# Patient Record
Sex: FEMALE | Race: WHITE | NOT HISPANIC OR LATINO | Employment: OTHER | URBAN - METROPOLITAN AREA
[De-identification: names, ages, dates, MRNs, and addresses within clinical notes are randomized per-mention and may not be internally consistent; named-entity substitution may affect disease eponyms.]

---

## 2018-09-04 ENCOUNTER — HOSPITAL ENCOUNTER (OUTPATIENT)
Dept: RADIOLOGY | Facility: HOSPITAL | Age: 63
Discharge: HOME | End: 2018-09-04
Attending: NURSE PRACTITIONER
Payer: MEDICAID

## 2018-09-04 VITALS — HEIGHT: 62 IN | BODY MASS INDEX: 42.69 KG/M2 | WEIGHT: 232 LBS

## 2018-09-04 DIAGNOSIS — Z12.31 VISIT FOR SCREENING MAMMOGRAM: ICD-10-CM

## 2018-09-04 DIAGNOSIS — Z12.31 VISIT FOR SCREENING MAMMOGRAM: Primary | ICD-10-CM

## 2018-09-04 PROCEDURE — 77067 SCR MAMMO BI INCL CAD: CPT | Mod: 26,,, | Performed by: RADIOLOGY

## 2018-09-04 PROCEDURE — 77067 SCR MAMMO BI INCL CAD: CPT | Mod: TC

## 2019-08-08 DIAGNOSIS — Z12.39 SCREENING BREAST EXAMINATION: Primary | ICD-10-CM

## 2019-11-26 DIAGNOSIS — Z12.31 ENCOUNTER FOR SCREENING MAMMOGRAM FOR BREAST CANCER: Primary | ICD-10-CM

## 2019-12-19 ENCOUNTER — HOSPITAL ENCOUNTER (OUTPATIENT)
Dept: RADIOLOGY | Facility: HOSPITAL | Age: 64
Discharge: HOME | End: 2019-12-19
Attending: NURSE PRACTITIONER
Payer: MEDICARE

## 2019-12-19 VITALS — HEIGHT: 62 IN | BODY MASS INDEX: 42.68 KG/M2 | WEIGHT: 231.94 LBS

## 2019-12-19 DIAGNOSIS — C50.919 BREAST CANCER IN FEMALE: ICD-10-CM

## 2019-12-19 DIAGNOSIS — Z12.31 ENCOUNTER FOR SCREENING MAMMOGRAM FOR BREAST CANCER: ICD-10-CM

## 2019-12-19 PROCEDURE — 77063 BREAST TOMOSYNTHESIS BI: CPT | Mod: 26,,, | Performed by: RADIOLOGY

## 2019-12-19 PROCEDURE — 77067 SCR MAMMO BI INCL CAD: CPT | Mod: 26,,, | Performed by: RADIOLOGY

## 2019-12-19 PROCEDURE — 77067 MAMMO DIGITAL SCREENING BILAT WITH TOMOSYNTHESIS_CAD: ICD-10-PCS | Mod: 26,,, | Performed by: RADIOLOGY

## 2019-12-19 PROCEDURE — 77067 SCR MAMMO BI INCL CAD: CPT | Mod: TC

## 2019-12-19 PROCEDURE — 77063 MAMMO DIGITAL SCREENING BILAT WITH TOMOSYNTHESIS_CAD: ICD-10-PCS | Mod: 26,,, | Performed by: RADIOLOGY

## 2019-12-19 PROCEDURE — 77063 BREAST TOMOSYNTHESIS BI: CPT | Mod: TC

## 2020-01-04 ENCOUNTER — HOSPITAL ENCOUNTER (EMERGENCY)
Facility: HOSPITAL | Age: 65
Discharge: HOME | End: 2020-01-04
Attending: EMERGENCY MEDICINE
Payer: MEDICARE

## 2020-01-04 VITALS
RESPIRATION RATE: 18 BRPM | WEIGHT: 218 LBS | SYSTOLIC BLOOD PRESSURE: 125 MMHG | OXYGEN SATURATION: 98 % | HEIGHT: 62 IN | TEMPERATURE: 98 F | BODY MASS INDEX: 40.12 KG/M2 | HEART RATE: 91 BPM | DIASTOLIC BLOOD PRESSURE: 70 MMHG

## 2020-01-04 DIAGNOSIS — L03.116 CELLULITIS OF LEFT FOOT: Primary | ICD-10-CM

## 2020-01-04 DIAGNOSIS — R52 PAIN: ICD-10-CM

## 2020-01-04 LAB
ALBUMIN SERPL BCP-MCNC: 4.4 G/DL (ref 3.5–5.2)
ALP SERPL-CCNC: 67 U/L (ref 55–135)
ALT SERPL W/O P-5'-P-CCNC: 11 U/L (ref 10–44)
ANION GAP SERPL CALC-SCNC: 18 MMOL/L (ref 8–16)
APTT BLDCRRT: 31.9 SEC (ref 21–32)
AST SERPL-CCNC: 17 U/L (ref 10–40)
BASOPHILS # BLD AUTO: 0.04 K/UL (ref 0–0.2)
BASOPHILS NFR BLD: 0.3 % (ref 0–1.9)
BILIRUB SERPL-MCNC: 1.4 MG/DL (ref 0.1–1)
BUN SERPL-MCNC: 12 MG/DL (ref 8–23)
CALCIUM SERPL-MCNC: 9.3 MG/DL (ref 8.7–10.5)
CHLORIDE SERPL-SCNC: 96 MMOL/L (ref 95–110)
CO2 SERPL-SCNC: 21 MMOL/L (ref 23–29)
CREAT SERPL-MCNC: 0.9 MG/DL (ref 0.5–1.4)
CRP SERPL-MCNC: 8 MG/DL (ref 0–0.75)
DIFFERENTIAL METHOD: ABNORMAL
EOSINOPHIL # BLD AUTO: 0 K/UL (ref 0–0.5)
EOSINOPHIL NFR BLD: 0.2 % (ref 0–8)
ERYTHROCYTE [DISTWIDTH] IN BLOOD BY AUTOMATED COUNT: 15.1 % (ref 11.5–14.5)
ERYTHROCYTE [SEDIMENTATION RATE] IN BLOOD BY WESTERGREN METHOD: 70 MM/HR (ref 0–20)
EST. GFR  (AFRICAN AMERICAN): >60 ML/MIN/1.73 M^2
EST. GFR  (NON AFRICAN AMERICAN): >60 ML/MIN/1.73 M^2
GLUCOSE SERPL-MCNC: 131 MG/DL (ref 70–110)
HCT VFR BLD AUTO: 38.9 % (ref 37–48.5)
HGB BLD-MCNC: 12.4 G/DL (ref 12–16)
IMM GRANULOCYTES # BLD AUTO: 0.04 K/UL (ref 0–0.04)
IMM GRANULOCYTES NFR BLD AUTO: 0.3 % (ref 0–0.5)
INR PPP: 1.2 (ref 0.8–1.2)
LYMPHOCYTES # BLD AUTO: 2.5 K/UL (ref 1–4.8)
LYMPHOCYTES NFR BLD: 19.2 % (ref 18–48)
MCH RBC QN AUTO: 25.7 PG (ref 27–31)
MCHC RBC AUTO-ENTMCNC: 31.9 G/DL (ref 32–36)
MCV RBC AUTO: 81 FL (ref 82–98)
MONOCYTES # BLD AUTO: 0.8 K/UL (ref 0.3–1)
MONOCYTES NFR BLD: 6.6 % (ref 4–15)
NEUTROPHILS # BLD AUTO: 9.4 K/UL (ref 1.8–7.7)
NEUTROPHILS NFR BLD: 73.4 % (ref 38–73)
NRBC BLD-RTO: 0 /100 WBC
PLATELET # BLD AUTO: 250 K/UL (ref 150–350)
PMV BLD AUTO: 10.1 FL (ref 9.2–12.9)
POCT GLUCOSE: 105 MG/DL (ref 70–110)
POTASSIUM SERPL-SCNC: 4 MMOL/L (ref 3.5–5.1)
PROT SERPL-MCNC: 8 G/DL (ref 6–8.4)
PROTHROMBIN TIME: 13.4 SEC (ref 9–12.5)
RBC # BLD AUTO: 4.82 M/UL (ref 4–5.4)
SODIUM SERPL-SCNC: 135 MMOL/L (ref 136–145)
WBC # BLD AUTO: 12.78 K/UL (ref 3.9–12.7)

## 2020-01-04 PROCEDURE — S0077 INJECTION, CLINDAMYCIN PHOSP: HCPCS | Performed by: EMERGENCY MEDICINE

## 2020-01-04 PROCEDURE — 85610 PROTHROMBIN TIME: CPT

## 2020-01-04 PROCEDURE — 85651 RBC SED RATE NONAUTOMATED: CPT

## 2020-01-04 PROCEDURE — 36415 COLL VENOUS BLD VENIPUNCTURE: CPT

## 2020-01-04 PROCEDURE — 96365 THER/PROPH/DIAG IV INF INIT: CPT

## 2020-01-04 PROCEDURE — 85025 COMPLETE CBC W/AUTO DIFF WBC: CPT

## 2020-01-04 PROCEDURE — 25000003 PHARM REV CODE 250: Performed by: EMERGENCY MEDICINE

## 2020-01-04 PROCEDURE — 73630 XR FOOT COMPLETE 3 VIEW LEFT: ICD-10-PCS | Mod: 26,LT,, | Performed by: RADIOLOGY

## 2020-01-04 PROCEDURE — 99284 EMERGENCY DEPT VISIT MOD MDM: CPT | Mod: 25

## 2020-01-04 PROCEDURE — 82962 GLUCOSE BLOOD TEST: CPT

## 2020-01-04 PROCEDURE — 85730 THROMBOPLASTIN TIME PARTIAL: CPT

## 2020-01-04 PROCEDURE — 73630 X-RAY EXAM OF FOOT: CPT | Mod: TC,FY,LT

## 2020-01-04 PROCEDURE — 86140 C-REACTIVE PROTEIN: CPT

## 2020-01-04 PROCEDURE — 87040 BLOOD CULTURE FOR BACTERIA: CPT

## 2020-01-04 PROCEDURE — 73630 X-RAY EXAM OF FOOT: CPT | Mod: 26,LT,, | Performed by: RADIOLOGY

## 2020-01-04 PROCEDURE — 80053 COMPREHEN METABOLIC PANEL: CPT

## 2020-01-04 RX ORDER — LOVASTATIN 10 MG/1
10 TABLET ORAL NIGHTLY
COMMUNITY
End: 2020-10-07 | Stop reason: SDUPTHER

## 2020-01-04 RX ORDER — FUROSEMIDE 40 MG/1
40 TABLET ORAL DAILY
COMMUNITY
End: 2020-10-07 | Stop reason: SDUPTHER

## 2020-01-04 RX ORDER — CLINDAMYCIN HYDROCHLORIDE 300 MG/1
300 CAPSULE ORAL 3 TIMES DAILY
Qty: 30 CAPSULE | Refills: 0 | Status: SHIPPED | OUTPATIENT
Start: 2020-01-04 | End: 2020-01-14

## 2020-01-04 RX ORDER — POTASSIUM CHLORIDE 750 MG/1
10 TABLET, EXTENDED RELEASE ORAL ONCE
Status: ON HOLD | COMMUNITY
End: 2023-01-01 | Stop reason: HOSPADM

## 2020-01-04 RX ORDER — LURASIDONE HYDROCHLORIDE 40 MG/1
80 TABLET, FILM COATED ORAL DAILY
COMMUNITY
End: 2020-09-24

## 2020-01-04 RX ORDER — LEVOTHYROXINE SODIUM 100 UG/1
100 TABLET ORAL DAILY
COMMUNITY
End: 2020-10-07 | Stop reason: SDUPTHER

## 2020-01-04 RX ORDER — CLINDAMYCIN PHOSPHATE 900 MG/50ML
900 INJECTION, SOLUTION INTRAVENOUS
Status: COMPLETED | OUTPATIENT
Start: 2020-01-04 | End: 2020-01-04

## 2020-01-04 RX ORDER — LISINOPRIL 20 MG/1
20 TABLET ORAL DAILY
COMMUNITY
End: 2020-10-07 | Stop reason: SDUPTHER

## 2020-01-04 RX ADMIN — CLINDAMYCIN IN 5 PERCENT DEXTROSE 900 MG: 18 INJECTION, SOLUTION INTRAVENOUS at 01:01

## 2020-01-07 NOTE — ED PROVIDER NOTES
Encounter Date: 1/4/2020       History     Chief Complaint   Patient presents with    Cellulitis     Patient has cellulitis of the left foot x4 days.     64-year-old female with past medical history of anxiety, bipolar disorder/schizophrenia, CHF, COPD, depression, diabetes, hypertension, PVD, and hypothyroidism presents to the ED for evaluation of left foot erythema, swelling, pain noted 4 days ago.  Pain is described as intermittent, throbbing, non-radiating.  Ambulatory without difficulty. Denies fever, chills, red streaking.          Review of patient's allergies indicates:   Allergen Reactions    Iodine and iodide containing products      Past Medical History:   Diagnosis Date    Anxiety     Bipolar 1 disorder     Breast cancer     CHF (congestive heart failure)     COPD (chronic obstructive pulmonary disease)     Depression     Diabetes mellitus     Hypertension     PVD (peripheral vascular disease)     Schizophrenia     Thyroid disease      Past Surgical History:   Procedure Laterality Date    BREAST LUMPECTOMY      TONSILLECTOMY       Family History   Problem Relation Age of Onset    Lung cancer Mother     Breast cancer Neg Hx      Social History     Tobacco Use    Smoking status: Former Smoker   Substance Use Topics    Alcohol use: Not Currently    Drug use: Never     Review of Systems   Constitutional: Negative for appetite change, chills, diaphoresis, fatigue and fever.   HENT: Negative for congestion, ear pain, rhinorrhea, sinus pressure, sinus pain, sore throat and tinnitus.    Eyes: Negative for photophobia and visual disturbance.   Respiratory: Negative for cough, chest tightness, shortness of breath and wheezing.    Cardiovascular: Negative for chest pain, palpitations and leg swelling.   Gastrointestinal: Negative for abdominal pain, constipation, diarrhea, nausea and vomiting.   Endocrine: Negative for cold intolerance, heat intolerance, polydipsia, polyphagia and polyuria.    Genitourinary: Negative for decreased urine volume, difficulty urinating, dysuria, flank pain, frequency, hematuria and urgency.   Musculoskeletal: Positive for arthralgias. Negative for back pain, gait problem, joint swelling, myalgias, neck pain and neck stiffness.   Skin: Positive for color change. Negative for pallor, rash and wound.   Allergic/Immunologic: Negative for immunocompromised state.   Neurological: Negative for dizziness, syncope, weakness, light-headedness, numbness and headaches.   Hematological: Negative for adenopathy. Does not bruise/bleed easily.   Psychiatric/Behavioral: Negative for decreased concentration, dysphoric mood and sleep disturbance. The patient is not nervous/anxious.    All other systems reviewed and are negative.      Physical Exam     Initial Vitals [01/04/20 1155]   BP Pulse Resp Temp SpO2   122/62 98 20 98.1 °F (36.7 °C) 97 %      MAP       --         Physical Exam    Nursing note and vitals reviewed.  Constitutional: She appears well-developed and well-nourished. She is not diaphoretic. No distress.   HENT:   Head: Normocephalic and atraumatic.   Right Ear: External ear normal.   Left Ear: External ear normal.   Nose: Nose normal.   Mouth/Throat: Oropharynx is clear and moist.   Eyes: Conjunctivae are normal. Pupils are equal, round, and reactive to light. No scleral icterus.   Neck: Normal range of motion. Neck supple. No JVD present.   Cardiovascular: Normal rate, regular rhythm, normal heart sounds and intact distal pulses.   Pulmonary/Chest: Breath sounds normal. No respiratory distress. She has no wheezes. She has no rhonchi. She exhibits no tenderness.   Abdominal: Soft. Bowel sounds are normal. She exhibits no distension. There is no tenderness. There is no rebound and no guarding.   Musculoskeletal: Normal range of motion. She exhibits no edema or tenderness.   Lymphadenopathy:     She has no cervical adenopathy.   Neurological: She is alert and oriented to person,  place, and time. GCS score is 15. GCS eye subscore is 4. GCS verbal subscore is 5. GCS motor subscore is 6.   Skin: Skin is warm and dry. Capillary refill takes less than 2 seconds. No rash noted. There is erythema. No pallor.   Psychiatric: She has a normal mood and affect. Her behavior is normal. Judgment and thought content normal.         ED Course   Procedures  Labs Reviewed   CBC W/ AUTO DIFFERENTIAL - Abnormal; Notable for the following components:       Result Value    WBC 12.78 (*)     Mean Corpuscular Volume 81 (*)     Mean Corpuscular Hemoglobin 25.7 (*)     Mean Corpuscular Hemoglobin Conc 31.9 (*)     RDW 15.1 (*)     Gran # (ANC) 9.4 (*)     Gran% 73.4 (*)     All other components within normal limits   COMPREHENSIVE METABOLIC PANEL - Abnormal; Notable for the following components:    Sodium 135 (*)     CO2 21 (*)     Glucose 131 (*)     Total Bilirubin 1.4 (*)     Anion Gap 18 (*)     All other components within normal limits   PROTIME-INR - Abnormal; Notable for the following components:    Prothrombin Time 13.4 (*)     All other components within normal limits   SEDIMENTATION RATE - Abnormal; Notable for the following components:    Sed Rate 70 (*)     All other components within normal limits   C-REACTIVE PROTEIN - Abnormal; Notable for the following components:    CRP 8.00 (*)     All other components within normal limits   CULTURE, BLOOD   CULTURE, BLOOD   APTT   POCT GLUCOSE          Imaging Results          X-Ray Foot Complete Left (Final result)  Result time 01/04/20 13:52:46    Final result by Alphonse Herrera MD (01/04/20 13:52:46)                 Impression:      Diffuse soft tissue swelling of the left foot without acute osseous abnormality      Electronically signed by: Alphonse Herrera MD  Date:    01/04/2020  Time:    13:52             Narrative:    EXAMINATION:  XR FOOT COMPLETE 3 VIEW LEFT    CLINICAL HISTORY:  .  Pain, unspecified    TECHNIQUE:  AP, lateral and oblique views of  the left foot were performed.    COMPARISON:  None    FINDINGS:  There is abundant soft tissue swelling diffusely throughout the left foot.  There is no soft tissue gas.  There is no fracture, dislocation, or osseous erosion.  A moderate Achilles enthesophyte is present.                                 Medical Decision Making:   Differential Diagnosis:   Cellulitis  ED Management:  As reviewed with the patient and daughter who are amenable to discharge with oral antibiotics  Advised return precautions and instructed to follow up closely with primary care                                 Clinical Impression:       ICD-10-CM ICD-9-CM   1. Cellulitis of left foot L03.116 682.7   2. Pain R52 780.96         Disposition:   Disposition: Discharged  Condition: Stable                     Betty Shields MD  01/07/20 0410

## 2020-01-09 LAB
BACTERIA BLD CULT: NORMAL
BACTERIA BLD CULT: NORMAL

## 2020-01-30 ENCOUNTER — LAB VISIT (OUTPATIENT)
Dept: LAB | Facility: HOSPITAL | Age: 65
End: 2020-01-30
Attending: NURSE PRACTITIONER
Payer: MEDICARE

## 2020-01-30 DIAGNOSIS — Z11.59 SCREENING EXAMINATION FOR POLIOMYELITIS: Primary | ICD-10-CM

## 2020-01-30 LAB
ALBUMIN SERPL BCP-MCNC: 4.2 G/DL (ref 3.5–5.2)
ALP SERPL-CCNC: 68 U/L (ref 55–135)
ALT SERPL W/O P-5'-P-CCNC: 12 U/L (ref 10–44)
ANION GAP SERPL CALC-SCNC: 12 MMOL/L (ref 8–16)
AST SERPL-CCNC: 18 U/L (ref 10–40)
BILIRUB SERPL-MCNC: 0.5 MG/DL (ref 0.1–1)
BUN SERPL-MCNC: 25 MG/DL (ref 8–23)
CALCIUM SERPL-MCNC: 9.1 MG/DL (ref 8.7–10.5)
CHLORIDE SERPL-SCNC: 98 MMOL/L (ref 95–110)
CO2 SERPL-SCNC: 28 MMOL/L (ref 23–29)
CREAT SERPL-MCNC: 0.9 MG/DL (ref 0.5–1.4)
EST. GFR  (AFRICAN AMERICAN): >60 ML/MIN/1.73 M^2
EST. GFR  (NON AFRICAN AMERICAN): >60 ML/MIN/1.73 M^2
GLUCOSE SERPL-MCNC: 116 MG/DL (ref 70–110)
POTASSIUM SERPL-SCNC: 4.4 MMOL/L (ref 3.5–5.1)
PROT SERPL-MCNC: 7.1 G/DL (ref 6–8.4)
SODIUM SERPL-SCNC: 138 MMOL/L (ref 136–145)

## 2020-01-30 PROCEDURE — 36415 COLL VENOUS BLD VENIPUNCTURE: CPT

## 2020-01-30 PROCEDURE — 86709 HEPATITIS A IGM ANTIBODY: CPT

## 2020-01-30 PROCEDURE — 80053 COMPREHEN METABOLIC PANEL: CPT

## 2020-01-30 PROCEDURE — 86705 HEP B CORE ANTIBODY IGM: CPT

## 2020-01-30 PROCEDURE — 86790 VIRUS ANTIBODY NOS: CPT

## 2020-01-30 PROCEDURE — 87340 HEPATITIS B SURFACE AG IA: CPT

## 2020-01-31 LAB
HAV IGM SERPL QL IA: ABNORMAL
HBV CORE IGM SERPL QL IA: NEGATIVE
HBV SURFACE AG SERPL QL IA: NEGATIVE
HEPATITIS A ANTIBODY, IGG: POSITIVE

## 2020-02-01 LAB — HEPATITIS C VIRUS (HCV) RNA DETECTION/QUANTIFICATION RT-PCR: NORMAL IU/ML

## 2020-09-24 ENCOUNTER — OFFICE VISIT (OUTPATIENT)
Dept: FAMILY MEDICINE | Facility: CLINIC | Age: 65
End: 2020-09-24
Payer: MEDICARE

## 2020-09-24 ENCOUNTER — TELEPHONE (OUTPATIENT)
Dept: FAMILY MEDICINE | Facility: CLINIC | Age: 65
End: 2020-09-24

## 2020-09-24 VITALS
BODY MASS INDEX: 44.53 KG/M2 | WEIGHT: 242 LBS | TEMPERATURE: 98 F | HEIGHT: 62 IN | OXYGEN SATURATION: 96 % | SYSTOLIC BLOOD PRESSURE: 160 MMHG | DIASTOLIC BLOOD PRESSURE: 80 MMHG | HEART RATE: 104 BPM

## 2020-09-24 DIAGNOSIS — R79.9 ABNORMAL FINDING OF BLOOD CHEMISTRY, UNSPECIFIED: ICD-10-CM

## 2020-09-24 DIAGNOSIS — Z13.1 SCREENING FOR DIABETES MELLITUS: ICD-10-CM

## 2020-09-24 DIAGNOSIS — F31.9 BIPOLAR AFFECTIVE DISORDER, REMISSION STATUS UNSPECIFIED: Primary | ICD-10-CM

## 2020-09-24 DIAGNOSIS — I10 ESSENTIAL HYPERTENSION: ICD-10-CM

## 2020-09-24 DIAGNOSIS — E03.9 HYPOTHYROIDISM, UNSPECIFIED TYPE: ICD-10-CM

## 2020-09-24 DIAGNOSIS — I50.9 CONGESTIVE HEART FAILURE, UNSPECIFIED HF CHRONICITY, UNSPECIFIED HEART FAILURE TYPE: ICD-10-CM

## 2020-09-24 DIAGNOSIS — Z11.59 ENCOUNTER FOR HEPATITIS C SCREENING TEST FOR LOW RISK PATIENT: ICD-10-CM

## 2020-09-24 PROCEDURE — 99203 OFFICE O/P NEW LOW 30 MIN: CPT | Mod: S$GLB,,, | Performed by: FAMILY MEDICINE

## 2020-09-24 PROCEDURE — 99203 PR OFFICE/OUTPT VISIT, NEW, LEVL III, 30-44 MIN: ICD-10-PCS | Mod: S$GLB,,, | Performed by: FAMILY MEDICINE

## 2020-09-24 RX ORDER — PROMETHAZINE HYDROCHLORIDE AND DEXTROMETHORPHAN HYDROBROMIDE 6.25; 15 MG/5ML; MG/5ML
5 SYRUP ORAL EVERY 6 HOURS PRN
COMMUNITY
Start: 2020-06-19 | End: 2020-09-24

## 2020-09-24 RX ORDER — ARIPIPRAZOLE 10 MG/1
30 TABLET ORAL DAILY
Qty: 90 TABLET | Refills: 11 | Status: SHIPPED | OUTPATIENT
Start: 2020-09-24 | End: 2021-09-24

## 2020-09-24 RX ORDER — BENZTROPINE MESYLATE 0.5 MG/1
TABLET ORAL
COMMUNITY
Start: 2020-09-21 | End: 2020-09-24

## 2020-09-24 RX ORDER — BREXPIPRAZOLE 3 MG/1
TABLET ORAL
COMMUNITY
Start: 2020-07-29 | End: 2020-09-24

## 2020-09-24 RX ORDER — ALBUTEROL SULFATE 90 UG/1
AEROSOL, METERED RESPIRATORY (INHALATION)
Status: ON HOLD | COMMUNITY
Start: 2020-09-02 | End: 2023-01-01 | Stop reason: HOSPADM

## 2020-09-24 RX ORDER — BREXPIPRAZOLE 2 MG/1
TABLET ORAL
COMMUNITY
Start: 2020-08-27 | End: 2020-09-24

## 2020-09-24 RX ORDER — DIPHENHYDRAMINE HCL 25 MG
TABLET ORAL
Status: ON HOLD | COMMUNITY
Start: 2020-09-02 | End: 2023-01-01 | Stop reason: HOSPADM

## 2020-09-24 RX ORDER — ARIPIPRAZOLE 10 MG/1
10 TABLET ORAL DAILY
COMMUNITY
End: 2020-09-24

## 2020-09-24 RX ORDER — CALCIUM CITRATE/VITAMIN D3 200MG-6.25
TABLET ORAL
Status: ON HOLD | COMMUNITY
Start: 2020-09-03 | End: 2023-01-01 | Stop reason: HOSPADM

## 2020-09-24 RX ORDER — PREDNISONE 10 MG/1
10 TABLET ORAL DAILY
COMMUNITY
Start: 2020-06-19 | End: 2020-09-24

## 2020-09-24 NOTE — TELEPHONE ENCOUNTER
----- Message from Alondra Brennan sent at 9/24/2020 12:28 PM CDT -----  Regarding: Medication  Type: Needs Medical Advice  Who Called:pt     Pharmacy name and phone #:    [id number] - [place], [place] - [address]  [address]  [place] [address]  Phone: [phone] Fax: [phone]     Best Call Back Number: [phone]  Additional Information: is is calling to discuss medication ABILIFY 10 mg Tab With the nurse. Pt needs Prior Auth. Please contact pharmacy.

## 2020-09-24 NOTE — PROGRESS NOTES
[facility] - Clinic Note    Subjective      Ms. Alfred is a 65 y.o. female who presents to clinic for [facility]    Patient presents to [facility].  She has a history of borderline diabetes.  She is not currently on any medications for this.  She has history of hypertension and is currently taking lisinopril.  She has a history of congestive heart failure.  Her last exacerbation was years ago.  She has not had an echocardiogram recently.  She takes 40 mg of Lasix daily.  When her swelling gets worse she takes an extra half a tablet.  She also has a history of hypothyroidism.  She has been on 100 mcg of levothyroxine daily for several years.  She also has a history of hyperlipidemia and is on lovastatin.  She also has a history of bipolar disorder and is followed by Dr. Pat but is having some issues with their office and the medications.  She has tried Rexulti which is not been helpful.  The medication that works the best is Abilify 30 mg daily.  She needs the brand name and not the generic which does not work as well.    [facility] Radha has a past medical history of Anxiety, Bipolar 1 disorder, Breast cancer, CHF (congestive heart failure), COPD (chronic obstructive pulmonary disease), Depression, Diabetes mellitus, Hypertension, PVD (peripheral vascular disease), Schizophrenia, and Thyroid disease.   PSXH Radha has a past surgical history that includes Breast lumpectomy and Tonsillectomy.    Radha's family history includes Lung cancer in her mother.    Radha reports that she has quit smoking. She does not have any smokeless tobacco history on file. She reports previous alcohol use. She reports that she does not use drugs.   ALG Radha is allergic to iodine and iodide containing products.   [facility] Radha has a current medication list which includes the following prescription(s): furosemide, levothyroxine, lisinopril, lovastatin, potassium chloride, true metrix air glucose meter, true metrix glucose test strip,  "ventolin hfa, abilify, and albuterol sulfate.     Review of Systems   Constitutional: Negative for chills and fever.   HENT: Negative for congestion and rhinorrhea.    Eyes: Negative for visual disturbance.   Respiratory: Negative for cough and shortness of breath.    Cardiovascular: Negative for chest pain.   Gastrointestinal: Negative for abdominal pain, constipation, diarrhea, nausea and vomiting.   Genitourinary: Negative for dysuria.   Musculoskeletal: Negative for myalgias.   Skin: Negative for rash.   Neurological: Negative for weakness and headaches.   Psychiatric/Behavioral: Positive for dysphoric mood.     Objective     BP (!) 160/80 (BP Location: Right arm, Patient Position: Sitting, BP Method: Large (Automatic))   Pulse 104   Temp 97.5 °F (36.4 °C) (Tympanic)   Ht 5' 2" (1.575 m)   Wt 109.8 kg (242 lb)   SpO2 96%   BMI 44.26 kg/m²     Physical Exam  Vitals signs and nursing note reviewed.   Constitutional:       General: She is not in acute distress.     Appearance: Normal appearance. She is well-developed. She is not diaphoretic.   HENT:      Head: Normocephalic and atraumatic.      Right Ear: External ear normal.      Left Ear: External ear normal.   Eyes:      General:         Right eye: No discharge.         Left eye: No discharge.   Cardiovascular:      Rate and Rhythm: Normal rate and regular rhythm.      Heart sounds: Normal heart sounds.   Pulmonary:      Effort: Pulmonary effort is normal.      Breath sounds: Normal breath sounds. No wheezing or rales.   Skin:     General: Skin is warm and dry.   Neurological:      Mental Status: She is alert and oriented to person, place, and time. Mental status is at baseline.   Psychiatric:         Mood and Affect: Mood is anxious. Affect is labile.         Behavior: Behavior normal.         Thought Content: Thought content normal.         Judgment: Judgment normal.        Assessment/Plan     1. Bipolar affective disorder, remission status unspecified  " ABILIFY 10 mg Tab   2. Encounter for hepatitis C screening test for low risk patient  Hepatitis C Antibody   3. Screening for diabetes mellitus  Hemoglobin A1C   4. Hypothyroidism, unspecified type  TSH   5. Essential hypertension  Comprehensive metabolic panel    Lipid Panel   6. Congestive heart failure, unspecified HF chronicity, unspecified heart failure type  Comprehensive metabolic panel    CBC auto differential    Echo Color Flow Doppler? Yes   7. Abnormal finding of blood chemistry, unspecified   Hemoglobin A1C     Will send in Abilify a brand name for bipolar disorder.  Keep follow-up with Dr. Pat.  Screening lab work as above.  Check TSH given history of hypothyroidism.  Check A1c given history of pre diabetes.  For congestive heart failure will obtain echocardiogram for further evaluation of heart function.  Continue other medications as prescribed.  Patient declines flu shot.  Follow-up in 1 month.    Future Appointments   Date Time Provider Department Center   9/25/2020  8:30 AM [facility], LABORATORY [facility] LAB [facility]   10/5/2020  1:30 PM CV [facility] ECHO 1 [facility] JANENE [facility] [address]   10/23/2020 10:20 AM Isael Juárez MD [facility]         Isael Juárez MD  Family Medicine  [facility]

## 2020-09-28 ENCOUNTER — LAB VISIT (OUTPATIENT)
Dept: LAB | Facility: HOSPITAL | Age: 65
End: 2020-09-28
Attending: FAMILY MEDICINE
Payer: MEDICARE

## 2020-09-28 ENCOUNTER — OFFICE VISIT (OUTPATIENT)
Dept: FAMILY MEDICINE | Facility: CLINIC | Age: 65
End: 2020-09-28
Payer: MEDICARE

## 2020-09-28 VITALS
BODY MASS INDEX: 43.5 KG/M2 | RESPIRATION RATE: 16 BRPM | OXYGEN SATURATION: 96 % | TEMPERATURE: 98 F | DIASTOLIC BLOOD PRESSURE: 71 MMHG | SYSTOLIC BLOOD PRESSURE: 147 MMHG | WEIGHT: 236.38 LBS | HEART RATE: 94 BPM | HEIGHT: 62 IN

## 2020-09-28 DIAGNOSIS — R79.9 ABNORMAL FINDING OF BLOOD CHEMISTRY, UNSPECIFIED: ICD-10-CM

## 2020-09-28 DIAGNOSIS — Z13.1 SCREENING FOR DIABETES MELLITUS: ICD-10-CM

## 2020-09-28 DIAGNOSIS — L30.9 DERMATITIS: Primary | ICD-10-CM

## 2020-09-28 DIAGNOSIS — E03.9 HYPOTHYROIDISM, UNSPECIFIED TYPE: ICD-10-CM

## 2020-09-28 DIAGNOSIS — I50.9 CONGESTIVE HEART FAILURE, UNSPECIFIED HF CHRONICITY, UNSPECIFIED HEART FAILURE TYPE: ICD-10-CM

## 2020-09-28 DIAGNOSIS — Z11.59 ENCOUNTER FOR HEPATITIS C SCREENING TEST FOR LOW RISK PATIENT: ICD-10-CM

## 2020-09-28 DIAGNOSIS — I10 ESSENTIAL HYPERTENSION: ICD-10-CM

## 2020-09-28 LAB
ALBUMIN SERPL BCP-MCNC: 4.3 G/DL (ref 3.5–5.2)
ALP SERPL-CCNC: 70 U/L (ref 55–135)
ALT SERPL W/O P-5'-P-CCNC: 12 U/L (ref 10–44)
ANION GAP SERPL CALC-SCNC: 13 MMOL/L (ref 8–16)
AST SERPL-CCNC: 17 U/L (ref 10–40)
BASOPHILS # BLD AUTO: 0.03 K/UL (ref 0–0.2)
BASOPHILS NFR BLD: 0.3 % (ref 0–1.9)
BILIRUB SERPL-MCNC: 1.3 MG/DL (ref 0.1–1)
BUN SERPL-MCNC: 26 MG/DL (ref 8–23)
CALCIUM SERPL-MCNC: 9.6 MG/DL (ref 8.7–10.5)
CHLORIDE SERPL-SCNC: 99 MMOL/L (ref 95–110)
CHOLEST SERPL-MCNC: 193 MG/DL (ref 120–199)
CHOLEST/HDLC SERPL: 2.6 {RATIO} (ref 2–5)
CO2 SERPL-SCNC: 26 MMOL/L (ref 23–29)
CREAT SERPL-MCNC: 0.7 MG/DL (ref 0.5–1.4)
DIFFERENTIAL METHOD: ABNORMAL
EOSINOPHIL # BLD AUTO: 0.1 K/UL (ref 0–0.5)
EOSINOPHIL NFR BLD: 0.8 % (ref 0–8)
ERYTHROCYTE [DISTWIDTH] IN BLOOD BY AUTOMATED COUNT: 16.7 % (ref 11.5–14.5)
EST. GFR  (AFRICAN AMERICAN): >60 ML/MIN/1.73 M^2
EST. GFR  (NON AFRICAN AMERICAN): >60 ML/MIN/1.73 M^2
ESTIMATED AVG GLUCOSE: 128 MG/DL (ref 68–131)
GLUCOSE SERPL-MCNC: 128 MG/DL (ref 70–110)
HBA1C MFR BLD HPLC: 6.1 % (ref 4.5–6.2)
HCT VFR BLD AUTO: 43.2 % (ref 37–48.5)
HDLC SERPL-MCNC: 75 MG/DL (ref 40–75)
HDLC SERPL: 38.9 % (ref 20–50)
HGB BLD-MCNC: 13.6 G/DL (ref 12–16)
IMM GRANULOCYTES # BLD AUTO: 0.03 K/UL (ref 0–0.04)
IMM GRANULOCYTES NFR BLD AUTO: 0.3 % (ref 0–0.5)
LDLC SERPL CALC-MCNC: 99 MG/DL (ref 63–159)
LYMPHOCYTES # BLD AUTO: 2.2 K/UL (ref 1–4.8)
LYMPHOCYTES NFR BLD: 23.6 % (ref 18–48)
MCH RBC QN AUTO: 26.1 PG (ref 27–31)
MCHC RBC AUTO-ENTMCNC: 31.5 G/DL (ref 32–36)
MCV RBC AUTO: 83 FL (ref 82–98)
MONOCYTES # BLD AUTO: 0.5 K/UL (ref 0.3–1)
MONOCYTES NFR BLD: 5.2 % (ref 4–15)
NEUTROPHILS # BLD AUTO: 6.4 K/UL (ref 1.8–7.7)
NEUTROPHILS NFR BLD: 69.8 % (ref 38–73)
NONHDLC SERPL-MCNC: 118 MG/DL
NRBC BLD-RTO: 0 /100 WBC
PLATELET # BLD AUTO: 231 K/UL (ref 150–350)
PMV BLD AUTO: 9.7 FL (ref 9.2–12.9)
POTASSIUM SERPL-SCNC: 4.4 MMOL/L (ref 3.5–5.1)
PROT SERPL-MCNC: 7.6 G/DL (ref 6–8.4)
RBC # BLD AUTO: 5.22 M/UL (ref 4–5.4)
SODIUM SERPL-SCNC: 138 MMOL/L (ref 136–145)
TRIGL SERPL-MCNC: 95 MG/DL (ref 30–150)
TSH SERPL DL<=0.005 MIU/L-ACNC: 1.47 UIU/ML (ref 0.34–5.6)
WBC # BLD AUTO: 9.18 K/UL (ref 3.9–12.7)

## 2020-09-28 PROCEDURE — 80053 COMPREHEN METABOLIC PANEL: CPT

## 2020-09-28 PROCEDURE — 83036 HEMOGLOBIN GLYCOSYLATED A1C: CPT

## 2020-09-28 PROCEDURE — 99213 PR OFFICE/OUTPT VISIT, EST, LEVL III, 20-29 MIN: ICD-10-PCS | Mod: S$GLB,,, | Performed by: FAMILY MEDICINE

## 2020-09-28 PROCEDURE — 80061 LIPID PANEL: CPT

## 2020-09-28 PROCEDURE — 85025 COMPLETE CBC W/AUTO DIFF WBC: CPT

## 2020-09-28 PROCEDURE — 84443 ASSAY THYROID STIM HORMONE: CPT

## 2020-09-28 PROCEDURE — 86803 HEPATITIS C AB TEST: CPT

## 2020-09-28 PROCEDURE — 99213 OFFICE O/P EST LOW 20 MIN: CPT | Mod: S$GLB,,, | Performed by: FAMILY MEDICINE

## 2020-09-28 PROCEDURE — 36415 COLL VENOUS BLD VENIPUNCTURE: CPT

## 2020-09-28 NOTE — PROGRESS NOTES
"[facility] - Clinic Note    Subjective      Ms. Alfred is a 65 y.o. female who presents to clinic for Rash (lower back)    Patient reports a rash on her lower back that is been present on and off for several years.  It is itchy.  It is erythematous.  She has been using over-the-counter itch cream which has been helping.    [facility] Radha has a past medical history of Anxiety, Bipolar 1 disorder, Breast cancer, CHF (congestive heart failure), COPD (chronic obstructive pulmonary disease), Depression, Diabetes mellitus, Hypertension, PVD (peripheral vascular disease), Schizophrenia, and Thyroid disease.   PSXH Radha has a past surgical history that includes Breast lumpectomy and Tonsillectomy.   FH Radha's family history includes Lung cancer in her mother.   SH Radha reports that she has quit smoking. She does not have any smokeless tobacco history on file. She reports previous alcohol use. She reports that she does not use drugs.   ALG Radha is allergic to cortisone; iodine; and iodine and iodide containing products.   [facility] Radha has a current medication list which includes the following prescription(s): albuterol sulfate, furosemide, levothyroxine, lisinopril, lovastatin, potassium chloride, true metrix air glucose meter, true metrix glucose test strip, ventolin hfa, and abilify.     Review of Systems   Constitutional: Negative for chills and fever.   Skin: Positive for rash.     Objective     BP (!) 147/71 (BP Location: Left arm, Patient Position: Sitting, BP Method: Large (Automatic))   Pulse 94   Temp 97.8 °F (36.6 °C) (Temporal)   Resp 16   Ht 5' 2" (1.575 m)   Wt 107.2 kg (236 lb 6.4 oz)   SpO2 96%   BMI 43.24 kg/m²     Physical Exam  Vitals signs and nursing note reviewed.   Constitutional:       General: She is not in acute distress.     Appearance: Normal appearance. She is well-developed. She is not diaphoretic.   HENT:      Head: Normocephalic and atraumatic.      Right Ear: External ear normal.      Left " Ear: External ear normal.   Eyes:      General:         Right eye: No discharge.         Left eye: No discharge.   Cardiovascular:      Rate and Rhythm: Normal rate and regular rhythm.      Heart sounds: Normal heart sounds.   Pulmonary:      Effort: Pulmonary effort is normal.      Breath sounds: Normal breath sounds. No wheezing or rales.   Skin:     General: Skin is warm and dry.      Comments: Erythematous patch on the lower back consistent with dermatitis with excoriation   Neurological:      Mental Status: She is alert and oriented to person, place, and time. Mental status is at baseline.   Psychiatric:         Mood and Affect: Mood normal.         Behavior: Behavior normal.         Thought Content: Thought content normal.         Judgment: Judgment normal.        Assessment/Plan     1. Dermatitis       Symptoms consistent with dermatitis.  Can be dry skin.  Recommended to continue is cream.  Use lotion.  Stay hydrated.  Follow-up as previously scheduled.    Future Appointments   Date Time Provider Department Center   10/5/2020  1:30 PM [facility] ECHO 1 [facility] JANENE [facility] [address]         Isael Juárez MD  Family Medicine  [facility]

## 2020-09-29 ENCOUNTER — TELEPHONE (OUTPATIENT)
Dept: FAMILY MEDICINE | Facility: CLINIC | Age: 65
End: 2020-09-29

## 2020-09-29 LAB — HCV AB SERPL QL IA: NEGATIVE

## 2020-09-29 NOTE — TELEPHONE ENCOUNTER
Pt. Requesting her AV from 9/28/2020 will pickup Friday.            ----- Message from Princess ALEX Cee sent at 9/29/2020  1:20 PM CDT -----  Contact: p  Type: Needs Medical Advice  Who Called:  pt  Best Call Back Number: [phone] (home)   Additional Information: requesting a call in regards to patient is needing Visit Summary from yesterday and would like to come Friday and pick it up. Please advise.

## 2020-09-30 ENCOUNTER — TELEPHONE (OUTPATIENT)
Dept: FAMILY MEDICINE | Facility: CLINIC | Age: 65
End: 2020-09-30

## 2020-09-30 DIAGNOSIS — Z91.89 AT RISK FOR CARDIOVASCULAR EVENT: Primary | ICD-10-CM

## 2020-09-30 NOTE — TELEPHONE ENCOUNTER
----- Message from Kylah Barron sent at 9/30/2020  2:34 PM CDT -----  Type:  Patient Returning Call    Who Called:  Patient  Who Left Message for Patient:  Cindy  Does the patient know what this is regarding?:  yes  Best Call Back Number:    Additional Information:  No response on messenger.

## 2020-09-30 NOTE — TELEPHONE ENCOUNTER
I called back the patient but didn't get an answer and the MB is full.    ----- Message from Yudith Saul sent at 9/30/2020  2:21 PM CDT -----  Type:  Patient Returning Call    Who Called:  patient   Who Left Message for Patient:  miley   Does the patient know what this is regarding?:  yes   Best Call Back Number:  [phone]  Additional Information:  Please advise-thank you

## 2020-09-30 NOTE — TELEPHONE ENCOUNTER
Spoke with patient, patient wanted AVS for last office visit. Advised office could print this as well as lab results at her appt 10/1 at 1420. Patient will discuss further with Dr. Juárez her labs.

## 2020-10-07 ENCOUNTER — OFFICE VISIT (OUTPATIENT)
Dept: FAMILY MEDICINE | Facility: CLINIC | Age: 65
End: 2020-10-07
Payer: MEDICARE

## 2020-10-07 ENCOUNTER — PATIENT OUTREACH (OUTPATIENT)
Dept: ADMINISTRATIVE | Facility: HOSPITAL | Age: 65
End: 2020-10-07

## 2020-10-07 VITALS
SYSTOLIC BLOOD PRESSURE: 138 MMHG | WEIGHT: 243.38 LBS | RESPIRATION RATE: 16 BRPM | TEMPERATURE: 98 F | HEIGHT: 62 IN | DIASTOLIC BLOOD PRESSURE: 78 MMHG | HEART RATE: 89 BPM | BODY MASS INDEX: 44.79 KG/M2 | OXYGEN SATURATION: 96 %

## 2020-10-07 DIAGNOSIS — Z12.31 ENCOUNTER FOR SCREENING MAMMOGRAM FOR MALIGNANT NEOPLASM OF BREAST: ICD-10-CM

## 2020-10-07 DIAGNOSIS — I10 ESSENTIAL HYPERTENSION: Primary | ICD-10-CM

## 2020-10-07 DIAGNOSIS — Z91.89 AT RISK FOR CARDIOVASCULAR EVENT: ICD-10-CM

## 2020-10-07 DIAGNOSIS — E03.9 HYPOTHYROIDISM, UNSPECIFIED TYPE: ICD-10-CM

## 2020-10-07 DIAGNOSIS — Z12.39 ENCOUNTER FOR SCREENING FOR MALIGNANT NEOPLASM OF BREAST, UNSPECIFIED SCREENING MODALITY: ICD-10-CM

## 2020-10-07 PROCEDURE — 99213 OFFICE O/P EST LOW 20 MIN: CPT | Mod: S$GLB,,, | Performed by: FAMILY MEDICINE

## 2020-10-07 PROCEDURE — 99213 PR OFFICE/OUTPT VISIT, EST, LEVL III, 20-29 MIN: ICD-10-PCS | Mod: S$GLB,,, | Performed by: FAMILY MEDICINE

## 2020-10-07 RX ORDER — FUROSEMIDE 40 MG/1
40 TABLET ORAL DAILY
Qty: 30 TABLET | Refills: 11 | Status: ON HOLD | OUTPATIENT
Start: 2020-10-07 | End: 2023-01-01 | Stop reason: SDUPTHER

## 2020-10-07 RX ORDER — LISINOPRIL 20 MG/1
20 TABLET ORAL DAILY
Qty: 30 TABLET | Refills: 11 | Status: ON HOLD | OUTPATIENT
Start: 2020-10-07 | End: 2023-01-01 | Stop reason: HOSPADM

## 2020-10-07 RX ORDER — LEVOTHYROXINE SODIUM 100 UG/1
100 TABLET ORAL DAILY
Qty: 30 TABLET | Refills: 11 | Status: SHIPPED | OUTPATIENT
Start: 2020-10-07

## 2020-10-07 RX ORDER — LOVASTATIN 10 MG/1
10 TABLET ORAL NIGHTLY
Qty: 30 TABLET | Refills: 11 | Status: ON HOLD | OUTPATIENT
Start: 2020-10-07 | End: 2023-01-01

## 2020-10-07 NOTE — PROGRESS NOTES
Population Health Outreach.  10/07/20  Fax sent to [facility] for most recent colonoscopy results

## 2020-10-07 NOTE — LETTER
FAX      AUTHORIZATION FOR RELEASE OF   CONFIDENTIAL INFORMATION            [facility]      We are seeing Radha Alfred, date of birth 1955, in the clinic at [facility]. Isael Juárez MD is the patient's PCP. Radha Alfred has an outstanding lab/procedure at the time we reviewed her chart. In order to help keep her health information updated, she has authorized us to request the following medical record(s):        (  )  MAMMOGRAM                                      ( X )  COLONOSCOPY      (  )  PAP SMEAR                                          (  )  MOST RECENT LAB RESULTS     (  )  DEXA SCAN                                          (  )  DIABETIC EYE EXAM            (  )  DIABETIC FOOT EXAM                        (  )  MOST RECENT A1c, LIPID, &          URINE MICRO-ALBUMIN     (  )  OUTSIDE IMMUNIZATIONS                 (  )  _______________        Please fax records to [facility]  [phone]     If you have any questions, please contact Keisha at [phone].      Keisha Lindquist L.P.N. Clinical Care Coordinator  [address] [place] [id number] [phone]

## 2020-10-07 NOTE — PROGRESS NOTES
"[facility] - Clinic Note    Subjective      Ms. Alfred is a 65 y.o. female who presents to clinic for Follow-up (requesting refills)    In regards to the patient's hypertension, patient denies chest pain, denies blurred vision, denies headaches and has been compliant with the medication regimen.     PM[facility] Radha has a past medical history of Anxiety, Bipolar 1 disorder, Breast cancer, CHF (congestive heart failure), COPD (chronic obstructive pulmonary disease), Depression, Diabetes mellitus, Hypertension, PVD (peripheral vascular disease), Schizophrenia, and Thyroid disease.   PSXH Radha has a past surgical history that includes Breast lumpectomy and Tonsillectomy.   FH Radha's family history includes Lung cancer in her mother.   SH Radha reports that she has quit smoking. She does not have any smokeless tobacco history on file. She reports previous alcohol use. She reports that she does not use drugs.   ALG Radha is allergic to cortisone; iodine; and iodine and iodide containing products.   [facility] Radha has a current medication list which includes the following prescription(s): albuterol sulfate, furosemide, levothyroxine, lisinopril, lovastatin, potassium chloride, true metrix air glucose meter, true metrix glucose test strip, ventolin hfa, and abilify.     Review of Systems   Constitutional: Negative for chills and fever.   Eyes: Negative for visual disturbance.   Cardiovascular: Negative for chest pain.   Neurological: Negative for headaches.     Objective     /78 (BP Location: Left arm, Patient Position: Sitting, BP Method: Large (Automatic))   Pulse 89   Temp 97.9 °F (36.6 °C) (Temporal)   Resp 16   Ht 5' 2" (1.575 m)   Wt 110.4 kg (243 lb 6.4 oz)   SpO2 96%   BMI 44.52 kg/m²     Physical Exam  Vitals signs and nursing note reviewed.   Constitutional:       General: She is not in acute distress.     Appearance: Normal appearance. She is well-developed. She is not diaphoretic.   HENT:      Head: " Normocephalic and atraumatic.      Right Ear: External ear normal.      Left Ear: External ear normal.   Eyes:      General:         Right eye: No discharge.         Left eye: No discharge.   Cardiovascular:      Rate and Rhythm: Normal rate and regular rhythm.      Heart sounds: Normal heart sounds.   Pulmonary:      Effort: Pulmonary effort is normal.      Breath sounds: Normal breath sounds. No wheezing or rales.   Skin:     General: Skin is warm and dry.   Neurological:      Mental Status: She is alert and oriented to person, place, and time. Mental status is at baseline.   Psychiatric:         Mood and Affect: Mood normal.         Behavior: Behavior normal.         Thought Content: Thought content normal.         Judgment: Judgment normal.        Assessment/Plan     1. Essential hypertension  furosemide (LASIX) 40 MG tablet    lisinopriL (PRINIVIL,ZESTRIL) 20 MG tablet   2. Hypothyroidism, unspecified type  levothyroxine (SYNTHROID) 100 MCG tablet   3. At risk for cardiovascular event  lovastatin (MEVACOR) 10 MG tablet   4. Encounter for screening for malignant neoplasm of breast, unspecified screening modality  Mammo Digital Screening Bilat w/ Tomas   5. Encounter for screening mammogram for malignant neoplasm of breast   Mammo Digital Screening Bilat w/ Tomas     Reviewed lab work patient.  Blood pressure well controlled.  Continue current medications as prescribed.  Refilled medications.  Screening mammogram in December.  Will obtain empty to obtain records from patient's previous colonoscopy.  Follow-up in 3 months.    Future Appointments   Date Time Provider Department Center   12/22/2020  9:00 AM [facility] MAMMO1 [facility] MAMMO [facility]   1/13/2021  1:20 PM Isael Juárez MD [facility]         Isael Juárez MD  Family Medicine  [facility]

## 2020-10-08 ENCOUNTER — PATIENT OUTREACH (OUTPATIENT)
Dept: ADMINISTRATIVE | Facility: HOSPITAL | Age: 65
End: 2020-10-08

## 2020-10-08 ENCOUNTER — TELEPHONE (OUTPATIENT)
Dept: ADMINISTRATIVE | Facility: HOSPITAL | Age: 65
End: 2020-10-08

## 2020-10-08 ENCOUNTER — DOCUMENTATION ONLY (OUTPATIENT)
Dept: ADMINISTRATIVE | Facility: HOSPITAL | Age: 65
End: 2020-10-08

## 2020-10-08 DIAGNOSIS — Z12.11 SCREENING FOR COLON CANCER: Primary | ICD-10-CM

## 2020-10-08 NOTE — PROGRESS NOTES
Population Health Outreach.  Spoke with pt about last Colonoscopy results from 2000. Educated her on Colorectal Ca Screening. She stated that she would like to have the Fit Kit for now. Mailing one today.

## 2020-10-21 ENCOUNTER — LAB VISIT (OUTPATIENT)
Dept: LAB | Facility: HOSPITAL | Age: 65
End: 2020-10-21
Attending: FAMILY MEDICINE
Payer: MEDICARE

## 2020-10-21 DIAGNOSIS — Z12.11 SCREENING FOR COLON CANCER: ICD-10-CM

## 2020-10-21 PROCEDURE — 82274 ASSAY TEST FOR BLOOD FECAL: CPT

## 2020-10-28 ENCOUNTER — TELEPHONE (OUTPATIENT)
Dept: FAMILY MEDICINE | Facility: CLINIC | Age: 65
End: 2020-10-28

## 2020-10-28 LAB — HEMOCCULT STL QL IA: POSITIVE

## 2020-10-28 NOTE — TELEPHONE ENCOUNTER
10/28/2020 Patient calling for the results of coplorguard. It is positive.    Fecal Immunochemical Test (iFOBT) Negative PositiveAbnormal       Please advise.    ----- Message from Nora Marin sent at 10/27/2020  1:57 PM CDT -----  Type: Needs Medical Advice    Who Called:  Patient  Best Call Back Number: [phone]  Additional Information:  Patient calling concerning stool sample she sent (Cologuard)would like to know if received and any results/please call patient back to advise.

## 2020-10-29 DIAGNOSIS — R19.5 POSITIVE FECAL OCCULT BLOOD TEST: Primary | ICD-10-CM

## 2020-10-29 NOTE — TELEPHONE ENCOUNTER
10/29/2020 4:01pm -- Notified patient of positive FOBT test and referral sent to General Surgery for colonoscopy. I told her they will call to schedule but if she doesn't hear from them she is to either call us back or call the call center to schedule. Patient voiced understanding.

## 2020-11-03 ENCOUNTER — TELEPHONE (OUTPATIENT)
Dept: FAMILY MEDICINE | Facility: CLINIC | Age: 65
End: 2020-11-03

## 2020-11-06 ENCOUNTER — TELEPHONE (OUTPATIENT)
Dept: FAMILY MEDICINE | Facility: CLINIC | Age: 65
End: 2020-11-06

## 2020-11-06 NOTE — TELEPHONE ENCOUNTER
11/06/2020-- 5:50PM---- Attempted to contact patient to ask what meter she wants. X1        ----- Message from Elyse Galaviz sent at 11/6/2020 11:06 AM CST -----  Type: Needs Medical Advice  Who Called: patient  Pharmacy name and phone #: [facility]  Best Call Back Number: [phone] (home)   Additional Information: the patient said she needs a new glucometer hers is not working right please call her to advise

## 2020-11-09 NOTE — TELEPHONE ENCOUNTER
I spoke to the patient and she stated that she will bring the glucometer back to the pharmacy for a replacement.

## 2020-12-14 DIAGNOSIS — Z12.31 SCREENING MAMMOGRAM, ENCOUNTER FOR: Primary | ICD-10-CM

## 2021-06-17 ENCOUNTER — HOSPITAL ENCOUNTER (OUTPATIENT)
Dept: RADIOLOGY | Facility: HOSPITAL | Age: 66
Discharge: HOME | End: 2021-06-17
Attending: NURSE PRACTITIONER
Payer: COMMERCIAL

## 2021-06-17 VITALS — BODY MASS INDEX: 43.79 KG/M2 | WEIGHT: 238 LBS | HEIGHT: 62 IN

## 2021-06-17 DIAGNOSIS — Z12.31 SCREENING MAMMOGRAM, ENCOUNTER FOR: ICD-10-CM

## 2021-06-17 PROCEDURE — 77067 MAMMO DIGITAL SCREENING BILAT WITH TOMO: ICD-10-PCS | Mod: 26,,, | Performed by: RADIOLOGY

## 2021-06-17 PROCEDURE — 77063 MAMMO DIGITAL SCREENING BILAT WITH TOMO: ICD-10-PCS | Mod: 26,,, | Performed by: RADIOLOGY

## 2021-06-17 PROCEDURE — 77067 SCR MAMMO BI INCL CAD: CPT | Mod: 26,,, | Performed by: RADIOLOGY

## 2021-06-17 PROCEDURE — 77063 BREAST TOMOSYNTHESIS BI: CPT | Mod: 26,,, | Performed by: RADIOLOGY

## 2021-06-17 PROCEDURE — 77067 SCR MAMMO BI INCL CAD: CPT | Mod: TC

## 2021-06-18 DIAGNOSIS — R92.8 OTHER ABNORMAL AND INCONCLUSIVE FINDINGS ON DIAGNOSTIC IMAGING OF BREAST: Primary | ICD-10-CM

## 2021-06-30 ENCOUNTER — HOSPITAL ENCOUNTER (OUTPATIENT)
Dept: RADIOLOGY | Facility: HOSPITAL | Age: 66
Discharge: HOME | End: 2021-06-30
Attending: NURSE PRACTITIONER
Payer: COMMERCIAL

## 2021-06-30 DIAGNOSIS — R92.8 OTHER ABNORMAL AND INCONCLUSIVE FINDINGS ON DIAGNOSTIC IMAGING OF BREAST: ICD-10-CM

## 2021-06-30 PROCEDURE — 77061 BREAST TOMOSYNTHESIS UNI: CPT | Mod: TC,LT

## 2021-06-30 PROCEDURE — 77065 DX MAMMO INCL CAD UNI: CPT | Mod: 26,LT,, | Performed by: RADIOLOGY

## 2021-06-30 PROCEDURE — 77061 MAMMO DIGITAL DIAGNOSTIC LEFT WITH TOMO: ICD-10-PCS | Mod: 26,LT,, | Performed by: RADIOLOGY

## 2021-06-30 PROCEDURE — 77065 MAMMO DIGITAL DIAGNOSTIC LEFT WITH TOMO: ICD-10-PCS | Mod: 26,LT,, | Performed by: RADIOLOGY

## 2021-06-30 PROCEDURE — 77061 BREAST TOMOSYNTHESIS UNI: CPT | Mod: 26,LT,, | Performed by: RADIOLOGY

## 2021-09-21 ENCOUNTER — HOSPITAL ENCOUNTER (OUTPATIENT)
Dept: CARDIOLOGY | Facility: HOSPITAL | Age: 66
Discharge: HOME | End: 2021-09-21
Attending: NURSE PRACTITIONER
Payer: COMMERCIAL

## 2021-09-21 ENCOUNTER — HOSPITAL ENCOUNTER (OUTPATIENT)
Dept: RADIOLOGY | Facility: HOSPITAL | Age: 66
Discharge: HOME | End: 2021-09-21
Attending: NURSE PRACTITIONER
Payer: COMMERCIAL

## 2021-09-21 DIAGNOSIS — I50.9 HEART FAILURE, UNSPECIFIED: Primary | ICD-10-CM

## 2021-09-21 DIAGNOSIS — I50.9 HEART FAILURE, UNSPECIFIED: ICD-10-CM

## 2021-09-21 PROCEDURE — 93010 ELECTROCARDIOGRAM REPORT: CPT | Mod: ,,, | Performed by: INTERNAL MEDICINE

## 2021-09-21 PROCEDURE — 93005 ELECTROCARDIOGRAM TRACING: CPT

## 2021-09-21 PROCEDURE — 71046 X-RAY EXAM CHEST 2 VIEWS: CPT | Mod: TC,FY

## 2021-09-21 PROCEDURE — 71046 XR CHEST PA AND LATERAL: ICD-10-PCS | Mod: 26,,, | Performed by: RADIOLOGY

## 2021-09-21 PROCEDURE — 93010 EKG 12-LEAD: ICD-10-PCS | Mod: ,,, | Performed by: INTERNAL MEDICINE

## 2021-09-21 PROCEDURE — 71046 X-RAY EXAM CHEST 2 VIEWS: CPT | Mod: 26,,, | Performed by: RADIOLOGY

## 2021-09-22 ENCOUNTER — HOSPITAL ENCOUNTER (EMERGENCY)
Facility: HOSPITAL | Age: 66
Discharge: ANOTHER HOSPITAL | End: 2021-09-23
Attending: FAMILY MEDICINE
Payer: COMMERCIAL

## 2021-09-22 VITALS
HEIGHT: 62 IN | DIASTOLIC BLOOD PRESSURE: 68 MMHG | SYSTOLIC BLOOD PRESSURE: 100 MMHG | WEIGHT: 264 LBS | HEART RATE: 120 BPM | RESPIRATION RATE: 19 BRPM | OXYGEN SATURATION: 94 % | BODY MASS INDEX: 48.58 KG/M2 | TEMPERATURE: 99 F

## 2021-09-22 DIAGNOSIS — F41.9 ANXIETY: ICD-10-CM

## 2021-09-22 DIAGNOSIS — I48.92 ATRIAL FLUTTER, UNSPECIFIED TYPE: ICD-10-CM

## 2021-09-22 DIAGNOSIS — I50.9 ACUTE ON CHRONIC CONGESTIVE HEART FAILURE, UNSPECIFIED HEART FAILURE TYPE: Primary | ICD-10-CM

## 2021-09-22 DIAGNOSIS — I95.9 HYPOTENSION, UNSPECIFIED HYPOTENSION TYPE: ICD-10-CM

## 2021-09-22 DIAGNOSIS — R06.02 SOB (SHORTNESS OF BREATH): ICD-10-CM

## 2021-09-22 LAB
ALBUMIN SERPL BCP-MCNC: 3.8 G/DL (ref 3.5–5.2)
ALP SERPL-CCNC: 73 U/L (ref 55–135)
ALT SERPL W/O P-5'-P-CCNC: 17 U/L (ref 10–44)
ANION GAP SERPL CALC-SCNC: 14 MMOL/L (ref 8–16)
AST SERPL-CCNC: 21 U/L (ref 10–40)
BASOPHILS # BLD AUTO: 0.05 K/UL (ref 0–0.2)
BASOPHILS NFR BLD: 0.5 % (ref 0–1.9)
BILIRUB SERPL-MCNC: 0.6 MG/DL (ref 0.1–1)
BNP SERPL-MCNC: 209 PG/ML (ref 0–99)
BUN SERPL-MCNC: 35 MG/DL (ref 8–23)
CALCIUM SERPL-MCNC: 9.8 MG/DL (ref 8.7–10.5)
CHLORIDE SERPL-SCNC: 99 MMOL/L (ref 95–110)
CO2 SERPL-SCNC: 28 MMOL/L (ref 23–29)
CREAT SERPL-MCNC: 1.1 MG/DL (ref 0.5–1.4)
D DIMER PPP IA.FEU-MCNC: 0.83 MG/L FEU
DIFFERENTIAL METHOD: ABNORMAL
EOSINOPHIL # BLD AUTO: 0.1 K/UL (ref 0–0.5)
EOSINOPHIL NFR BLD: 1.3 % (ref 0–8)
ERYTHROCYTE [DISTWIDTH] IN BLOOD BY AUTOMATED COUNT: 14.4 % (ref 11.5–14.5)
EST. GFR  (AFRICAN AMERICAN): >60 ML/MIN/1.73 M^2
EST. GFR  (NON AFRICAN AMERICAN): 52.1 ML/MIN/1.73 M^2
GLUCOSE SERPL-MCNC: 105 MG/DL (ref 70–110)
HCT VFR BLD AUTO: 44.8 % (ref 37–48.5)
HGB BLD-MCNC: 13.6 G/DL (ref 12–16)
IMM GRANULOCYTES # BLD AUTO: 0.03 K/UL (ref 0–0.04)
IMM GRANULOCYTES NFR BLD AUTO: 0.3 % (ref 0–0.5)
LYMPHOCYTES # BLD AUTO: 2.5 K/UL (ref 1–4.8)
LYMPHOCYTES NFR BLD: 24.5 % (ref 18–48)
MCH RBC QN AUTO: 27 PG (ref 27–31)
MCHC RBC AUTO-ENTMCNC: 30.4 G/DL (ref 32–36)
MCV RBC AUTO: 89 FL (ref 82–98)
MONOCYTES # BLD AUTO: 0.7 K/UL (ref 0.3–1)
MONOCYTES NFR BLD: 7.1 % (ref 4–15)
NEUTROPHILS # BLD AUTO: 6.7 K/UL (ref 1.8–7.7)
NEUTROPHILS NFR BLD: 66.3 % (ref 38–73)
NRBC BLD-RTO: 0 /100 WBC
PLATELET # BLD AUTO: 287 K/UL (ref 150–450)
PMV BLD AUTO: 10 FL (ref 9.2–12.9)
POTASSIUM SERPL-SCNC: 4.8 MMOL/L (ref 3.5–5.1)
PROT SERPL-MCNC: 7.4 G/DL (ref 6–8.4)
RBC # BLD AUTO: 5.03 M/UL (ref 4–5.4)
SARS-COV-2 RDRP RESP QL NAA+PROBE: NEGATIVE
SODIUM SERPL-SCNC: 141 MMOL/L (ref 136–145)
TROPONIN I SERPL DL<=0.01 NG/ML-MCNC: 0.03 NG/ML (ref 0–0.03)
TROPONIN I SERPL DL<=0.01 NG/ML-MCNC: 0.03 NG/ML (ref 0–0.03)
WBC # BLD AUTO: 10.06 K/UL (ref 3.9–12.7)

## 2021-09-22 PROCEDURE — 99291 CRITICAL CARE FIRST HOUR: CPT | Mod: 25

## 2021-09-22 PROCEDURE — 96375 TX/PRO/DX INJ NEW DRUG ADDON: CPT

## 2021-09-22 PROCEDURE — 93005 ELECTROCARDIOGRAM TRACING: CPT

## 2021-09-22 PROCEDURE — 84484 ASSAY OF TROPONIN QUANT: CPT | Mod: 91 | Performed by: FAMILY MEDICINE

## 2021-09-22 PROCEDURE — 93010 ELECTROCARDIOGRAM REPORT: CPT | Mod: ,,, | Performed by: INTERNAL MEDICINE

## 2021-09-22 PROCEDURE — 85025 COMPLETE CBC W/AUTO DIFF WBC: CPT | Performed by: FAMILY MEDICINE

## 2021-09-22 PROCEDURE — 96361 HYDRATE IV INFUSION ADD-ON: CPT

## 2021-09-22 PROCEDURE — 84484 ASSAY OF TROPONIN QUANT: CPT | Performed by: EMERGENCY MEDICINE

## 2021-09-22 PROCEDURE — 25000003 PHARM REV CODE 250: Performed by: EMERGENCY MEDICINE

## 2021-09-22 PROCEDURE — 85379 FIBRIN DEGRADATION QUANT: CPT | Performed by: FAMILY MEDICINE

## 2021-09-22 PROCEDURE — 63600175 PHARM REV CODE 636 W HCPCS: Performed by: FAMILY MEDICINE

## 2021-09-22 PROCEDURE — U0002 COVID-19 LAB TEST NON-CDC: HCPCS | Performed by: FAMILY MEDICINE

## 2021-09-22 PROCEDURE — 80053 COMPREHEN METABOLIC PANEL: CPT | Performed by: FAMILY MEDICINE

## 2021-09-22 PROCEDURE — 71045 X-RAY EXAM CHEST 1 VIEW: CPT | Mod: 26,,, | Performed by: RADIOLOGY

## 2021-09-22 PROCEDURE — 93010 EKG 12-LEAD: ICD-10-PCS | Mod: ,,, | Performed by: INTERNAL MEDICINE

## 2021-09-22 PROCEDURE — 96374 THER/PROPH/DIAG INJ IV PUSH: CPT

## 2021-09-22 PROCEDURE — 71045 XR CHEST AP PORTABLE: ICD-10-PCS | Mod: 26,,, | Performed by: RADIOLOGY

## 2021-09-22 PROCEDURE — 83880 ASSAY OF NATRIURETIC PEPTIDE: CPT | Performed by: FAMILY MEDICINE

## 2021-09-22 PROCEDURE — 71045 X-RAY EXAM CHEST 1 VIEW: CPT | Mod: TC,FY

## 2021-09-22 RX ORDER — FUROSEMIDE 10 MG/ML
60 INJECTION INTRAMUSCULAR; INTRAVENOUS
Status: COMPLETED | OUTPATIENT
Start: 2021-09-22 | End: 2021-09-22

## 2021-09-22 RX ORDER — NOREPINEPHRINE BITARTRATE/D5W 4MG/250ML
0.05 PLASTIC BAG, INJECTION (ML) INTRAVENOUS CONTINUOUS
Status: DISCONTINUED | OUTPATIENT
Start: 2021-09-23 | End: 2021-09-23

## 2021-09-22 RX ORDER — DILTIAZEM HYDROCHLORIDE 5 MG/ML
15 INJECTION INTRAVENOUS
Status: COMPLETED | OUTPATIENT
Start: 2021-09-22 | End: 2021-09-22

## 2021-09-22 RX ORDER — SODIUM CHLORIDE 9 MG/ML
INJECTION, SOLUTION INTRAVENOUS
Status: DISCONTINUED | OUTPATIENT
Start: 2021-09-22 | End: 2021-09-22

## 2021-09-22 RX ORDER — LORAZEPAM 2 MG/ML
1 INJECTION INTRAMUSCULAR
Status: COMPLETED | OUTPATIENT
Start: 2021-09-22 | End: 2021-09-22

## 2021-09-22 RX ORDER — METOPROLOL TARTRATE 25 MG/1
25 TABLET, FILM COATED ORAL
Status: COMPLETED | OUTPATIENT
Start: 2021-09-22 | End: 2021-09-22

## 2021-09-22 RX ADMIN — LORAZEPAM 1 MG: 2 INJECTION, SOLUTION INTRAMUSCULAR; INTRAVENOUS at 04:09

## 2021-09-22 RX ADMIN — FUROSEMIDE 60 MG: 10 INJECTION, SOLUTION INTRAMUSCULAR; INTRAVENOUS at 05:09

## 2021-09-22 RX ADMIN — SODIUM CHLORIDE 500 ML: 9 INJECTION, SOLUTION INTRAVENOUS at 08:09

## 2021-09-22 RX ADMIN — METOPROLOL TARTRATE 25 MG: 25 TABLET, FILM COATED ORAL at 07:09

## 2021-09-22 RX ADMIN — SODIUM CHLORIDE 500 ML: 0.9 INJECTION, SOLUTION INTRAVENOUS at 10:09

## 2021-09-22 RX ADMIN — DILTIAZEM HYDROCHLORIDE 15 MG: 5 INJECTION, SOLUTION INTRAVENOUS at 07:09

## 2021-09-23 RX ORDER — SODIUM CHLORIDE 9 MG/ML
INJECTION, SOLUTION INTRAVENOUS
Status: DISCONTINUED | OUTPATIENT
Start: 2021-09-23 | End: 2021-09-23

## 2021-12-09 ENCOUNTER — TELEPHONE (OUTPATIENT)
Dept: CARDIOLOGY | Facility: CLINIC | Age: 66
End: 2021-12-09
Payer: COMMERCIAL

## 2023-01-01 ENCOUNTER — HOSPITAL ENCOUNTER (INPATIENT)
Facility: HOSPITAL | Age: 68
LOS: 2 days | Discharge: HOME | DRG: 812 | End: 2023-11-08
Attending: INTERNAL MEDICINE | Admitting: INTERNAL MEDICINE
Payer: COMMERCIAL

## 2023-01-01 ENCOUNTER — TELEPHONE (OUTPATIENT)
Dept: PHARMACY | Facility: HOSPITAL | Age: 68
End: 2023-01-01

## 2023-01-01 ENCOUNTER — HOSPITAL ENCOUNTER (EMERGENCY)
Facility: HOSPITAL | Age: 68
Discharge: ANOTHER HOSPITAL | End: 2023-11-18
Attending: EMERGENCY MEDICINE
Payer: COMMERCIAL

## 2023-01-01 ENCOUNTER — PATIENT OUTREACH (OUTPATIENT)
Dept: ADMINISTRATIVE | Facility: CLINIC | Age: 68
End: 2023-01-01
Payer: COMMERCIAL

## 2023-01-01 ENCOUNTER — TELEPHONE (OUTPATIENT)
Dept: PODIATRY | Facility: CLINIC | Age: 68
End: 2023-01-01
Payer: COMMERCIAL

## 2023-01-01 ENCOUNTER — HOSPITAL ENCOUNTER (INPATIENT)
Facility: HOSPITAL | Age: 68
LOS: 2 days | Discharge: PSYCHIATRIC OR SUBSTANCE USE FACILITY | DRG: 689 | End: 2023-06-07
Attending: FAMILY MEDICINE | Admitting: INTERNAL MEDICINE
Payer: COMMERCIAL

## 2023-01-01 ENCOUNTER — PATIENT OUTREACH (OUTPATIENT)
Dept: ADMINISTRATIVE | Facility: OTHER | Age: 68
End: 2023-01-01
Payer: COMMERCIAL

## 2023-01-01 ENCOUNTER — HOSPITAL ENCOUNTER (EMERGENCY)
Facility: HOSPITAL | Age: 68
Discharge: ANOTHER HOSPITAL | End: 2023-11-06
Attending: EMERGENCY MEDICINE
Payer: COMMERCIAL

## 2023-01-01 ENCOUNTER — HOSPITAL ENCOUNTER (INPATIENT)
Facility: HOSPITAL | Age: 68
LOS: 5 days | Discharge: HOME HEALTH | DRG: 291 | End: 2023-05-24
Attending: STUDENT IN AN ORGANIZED HEALTH CARE EDUCATION/TRAINING PROGRAM | Admitting: STUDENT IN AN ORGANIZED HEALTH CARE EDUCATION/TRAINING PROGRAM
Payer: COMMERCIAL

## 2023-01-01 VITALS
HEART RATE: 94 BPM | RESPIRATION RATE: 15 BRPM | TEMPERATURE: 98 F | SYSTOLIC BLOOD PRESSURE: 105 MMHG | DIASTOLIC BLOOD PRESSURE: 51 MMHG | WEIGHT: 220.38 LBS | OXYGEN SATURATION: 94 % | BODY MASS INDEX: 40.55 KG/M2 | HEIGHT: 62 IN

## 2023-01-01 VITALS
BODY MASS INDEX: 43.24 KG/M2 | OXYGEN SATURATION: 94 % | RESPIRATION RATE: 18 BRPM | WEIGHT: 235 LBS | HEART RATE: 99 BPM | DIASTOLIC BLOOD PRESSURE: 69 MMHG | TEMPERATURE: 98 F | SYSTOLIC BLOOD PRESSURE: 150 MMHG | HEIGHT: 62 IN

## 2023-01-01 VITALS
RESPIRATION RATE: 15 BRPM | SYSTOLIC BLOOD PRESSURE: 137 MMHG | OXYGEN SATURATION: 96 % | DIASTOLIC BLOOD PRESSURE: 61 MMHG | BODY MASS INDEX: 40 KG/M2 | WEIGHT: 217.38 LBS | HEART RATE: 85 BPM | TEMPERATURE: 98 F | HEIGHT: 62 IN

## 2023-01-01 VITALS
RESPIRATION RATE: 14 BRPM | TEMPERATURE: 98 F | DIASTOLIC BLOOD PRESSURE: 42 MMHG | SYSTOLIC BLOOD PRESSURE: 94 MMHG | OXYGEN SATURATION: 99 % | HEART RATE: 90 BPM

## 2023-01-01 VITALS
BODY MASS INDEX: 39.93 KG/M2 | DIASTOLIC BLOOD PRESSURE: 48 MMHG | WEIGHT: 217 LBS | HEART RATE: 91 BPM | TEMPERATURE: 99 F | HEIGHT: 62 IN | OXYGEN SATURATION: 100 % | RESPIRATION RATE: 21 BRPM | SYSTOLIC BLOOD PRESSURE: 102 MMHG

## 2023-01-01 DIAGNOSIS — I50.9 CHF (CONGESTIVE HEART FAILURE): ICD-10-CM

## 2023-01-01 DIAGNOSIS — D64.9 SEVERE ANEMIA: ICD-10-CM

## 2023-01-01 DIAGNOSIS — I50.9 ACUTE ON CHRONIC CONGESTIVE HEART FAILURE, UNSPECIFIED HEART FAILURE TYPE: ICD-10-CM

## 2023-01-01 DIAGNOSIS — R06.02 SOB (SHORTNESS OF BREATH): ICD-10-CM

## 2023-01-01 DIAGNOSIS — F31.9 BIPOLAR 1 DISORDER: ICD-10-CM

## 2023-01-01 DIAGNOSIS — D64.9 ANEMIA, UNSPECIFIED TYPE: ICD-10-CM

## 2023-01-01 DIAGNOSIS — R53.1 WEAKNESS: ICD-10-CM

## 2023-01-01 DIAGNOSIS — J96.01 ACUTE RESPIRATORY FAILURE WITH HYPOXIA: Primary | ICD-10-CM

## 2023-01-01 DIAGNOSIS — J44.1 COPD EXACERBATION: ICD-10-CM

## 2023-01-01 DIAGNOSIS — E87.6 HYPOKALEMIA: ICD-10-CM

## 2023-01-01 DIAGNOSIS — J44.9 CHRONIC OBSTRUCTIVE PULMONARY DISEASE, UNSPECIFIED COPD TYPE: ICD-10-CM

## 2023-01-01 DIAGNOSIS — K92.2 GASTROINTESTINAL HEMORRHAGE, UNSPECIFIED GASTROINTESTINAL HEMORRHAGE TYPE: ICD-10-CM

## 2023-01-01 DIAGNOSIS — J44.1 COPD WITH ACUTE EXACERBATION: Primary | ICD-10-CM

## 2023-01-01 DIAGNOSIS — M79.671 RIGHT FOOT PAIN: ICD-10-CM

## 2023-01-01 DIAGNOSIS — I50.33 ACUTE ON CHRONIC DIASTOLIC HEART FAILURE: ICD-10-CM

## 2023-01-01 DIAGNOSIS — K92.1 MELENA: Primary | ICD-10-CM

## 2023-01-01 DIAGNOSIS — M76.70 PERONEAL TENDONITIS, UNSPECIFIED LATERALITY: ICD-10-CM

## 2023-01-01 DIAGNOSIS — E66.01 SEVERE OBESITY (BMI >= 40): ICD-10-CM

## 2023-01-01 DIAGNOSIS — F25.0 SCHIZOAFFECTIVE DISORDER, BIPOLAR TYPE: ICD-10-CM

## 2023-01-01 DIAGNOSIS — D64.9 ACUTE ANEMIA: ICD-10-CM

## 2023-01-01 DIAGNOSIS — I10 ESSENTIAL HYPERTENSION: ICD-10-CM

## 2023-01-01 DIAGNOSIS — E03.9 HYPOTHYROIDISM, UNSPECIFIED TYPE: ICD-10-CM

## 2023-01-01 DIAGNOSIS — M19.079 DJD (DEGENERATIVE JOINT DISEASE), ANKLE AND FOOT, UNSPECIFIED LATERALITY: ICD-10-CM

## 2023-01-01 DIAGNOSIS — I50.43 ACUTE ON CHRONIC COMBINED SYSTOLIC AND DIASTOLIC HEART FAILURE: Primary | ICD-10-CM

## 2023-01-01 DIAGNOSIS — I50.43 ACUTE ON CHRONIC COMBINED SYSTOLIC AND DIASTOLIC HEART FAILURE: ICD-10-CM

## 2023-01-01 DIAGNOSIS — R79.89 ELEVATED TROPONIN LEVEL NOT DUE TO ACUTE CORONARY SYNDROME: ICD-10-CM

## 2023-01-01 DIAGNOSIS — N30.00 ACUTE CYSTITIS WITHOUT HEMATURIA: ICD-10-CM

## 2023-01-01 DIAGNOSIS — I50.9 HEART FAILURE: ICD-10-CM

## 2023-01-01 DIAGNOSIS — R79.89 TROPONIN LEVEL ELEVATED: ICD-10-CM

## 2023-01-01 DIAGNOSIS — I50.9 ACUTE ON CHRONIC HEART FAILURE: Primary | ICD-10-CM

## 2023-01-01 DIAGNOSIS — M72.2 PLANTAR FASCIITIS: ICD-10-CM

## 2023-01-01 DIAGNOSIS — D50.9 MICROCYTIC ANEMIA: ICD-10-CM

## 2023-01-01 DIAGNOSIS — R50.9 FEVER, UNKNOWN ORIGIN: ICD-10-CM

## 2023-01-01 DIAGNOSIS — R07.9 CHEST PAIN: ICD-10-CM

## 2023-01-01 DIAGNOSIS — R06.02 SHORTNESS OF BREATH: ICD-10-CM

## 2023-01-01 LAB
ABO + RH BLD: NORMAL
ALBUMIN SERPL BCP-MCNC: 2.9 G/DL (ref 3.5–5.2)
ALBUMIN SERPL BCP-MCNC: 3 G/DL (ref 3.5–5.2)
ALBUMIN SERPL BCP-MCNC: 3.1 G/DL (ref 3.5–5.2)
ALBUMIN SERPL BCP-MCNC: 3.1 G/DL (ref 3.5–5.2)
ALBUMIN SERPL BCP-MCNC: 3.3 G/DL (ref 3.5–5.2)
ALBUMIN SERPL BCP-MCNC: 3.4 G/DL (ref 3.5–5.2)
ALBUMIN SERPL BCP-MCNC: 3.5 G/DL (ref 3.5–5.2)
ALBUMIN SERPL BCP-MCNC: 3.5 G/DL (ref 3.5–5.2)
ALBUMIN SERPL BCP-MCNC: 3.7 G/DL (ref 3.5–5.2)
ALBUMIN SERPL BCP-MCNC: 3.9 G/DL (ref 3.5–5.2)
ALBUMIN SERPL BCP-MCNC: 4 G/DL (ref 3.5–5.2)
ALBUMIN SERPL BCP-MCNC: 4.2 G/DL (ref 3.5–5.2)
ALLENS TEST: ABNORMAL
ALP SERPL-CCNC: 51 U/L (ref 55–135)
ALP SERPL-CCNC: 58 U/L (ref 55–135)
ALP SERPL-CCNC: 66 U/L (ref 55–135)
ALP SERPL-CCNC: 69 U/L (ref 55–135)
ALP SERPL-CCNC: 69 U/L (ref 55–135)
ALP SERPL-CCNC: 71 U/L (ref 55–135)
ALP SERPL-CCNC: 72 U/L (ref 55–135)
ALP SERPL-CCNC: 75 U/L (ref 55–135)
ALP SERPL-CCNC: 77 U/L (ref 55–135)
ALP SERPL-CCNC: 80 U/L (ref 55–135)
ALP SERPL-CCNC: 84 U/L (ref 55–135)
ALP SERPL-CCNC: 89 U/L (ref 55–135)
ALT SERPL W/O P-5'-P-CCNC: 11 U/L (ref 10–44)
ALT SERPL W/O P-5'-P-CCNC: 13 U/L (ref 10–44)
ALT SERPL W/O P-5'-P-CCNC: 14 U/L (ref 10–44)
ALT SERPL W/O P-5'-P-CCNC: 20 U/L (ref 10–44)
ALT SERPL W/O P-5'-P-CCNC: 6 U/L (ref 10–44)
ALT SERPL W/O P-5'-P-CCNC: 7 U/L (ref 10–44)
ALT SERPL W/O P-5'-P-CCNC: 8 U/L (ref 10–44)
AMPHET+METHAMPHET UR QL: NEGATIVE
ANION GAP SERPL CALC-SCNC: 10 MMOL/L (ref 8–16)
ANION GAP SERPL CALC-SCNC: 10 MMOL/L (ref 8–16)
ANION GAP SERPL CALC-SCNC: 11 MMOL/L (ref 8–16)
ANION GAP SERPL CALC-SCNC: 12 MMOL/L (ref 8–16)
ANION GAP SERPL CALC-SCNC: 12 MMOL/L (ref 8–16)
ANION GAP SERPL CALC-SCNC: 13 MMOL/L (ref 8–16)
ANION GAP SERPL CALC-SCNC: 15 MMOL/L (ref 8–16)
ANION GAP SERPL CALC-SCNC: 18 MMOL/L (ref 8–16)
ANION GAP SERPL CALC-SCNC: 5 MMOL/L (ref 8–16)
ANION GAP SERPL CALC-SCNC: 7 MMOL/L (ref 8–16)
ANION GAP SERPL CALC-SCNC: 8 MMOL/L (ref 8–16)
ANION GAP SERPL CALC-SCNC: 8 MMOL/L (ref 8–16)
ANISOCYTOSIS BLD QL SMEAR: SLIGHT
AORTIC ROOT ANNULUS: 2.85 CM
AORTIC VALVE CUSP SEPERATION: 1.9 CM
AST SERPL-CCNC: 12 U/L (ref 10–40)
AST SERPL-CCNC: 13 U/L (ref 10–40)
AST SERPL-CCNC: 14 U/L (ref 10–40)
AST SERPL-CCNC: 16 U/L (ref 10–40)
AST SERPL-CCNC: 17 U/L (ref 10–40)
AST SERPL-CCNC: 19 U/L (ref 10–40)
AST SERPL-CCNC: 20 U/L (ref 10–40)
AST SERPL-CCNC: 24 U/L (ref 10–40)
BACTERIA #/AREA URNS HPF: ABNORMAL /HPF
BACTERIA #/AREA URNS HPF: NORMAL /HPF
BACTERIA BLD CULT: NORMAL
BACTERIA SPEC AEROBE CULT: NORMAL
BACTERIA SPEC AEROBE CULT: NORMAL
BACTERIA UR CULT: ABNORMAL
BARBITURATES UR QL SCN>200 NG/ML: NEGATIVE
BASOPHILS # BLD AUTO: 0.01 K/UL (ref 0–0.2)
BASOPHILS # BLD AUTO: 0.01 K/UL (ref 0–0.2)
BASOPHILS # BLD AUTO: 0.03 K/UL (ref 0–0.2)
BASOPHILS # BLD AUTO: 0.04 K/UL (ref 0–0.2)
BASOPHILS # BLD AUTO: 0.05 K/UL (ref 0–0.2)
BASOPHILS # BLD AUTO: 0.06 K/UL (ref 0–0.2)
BASOPHILS # BLD AUTO: ABNORMAL K/UL (ref 0–0.2)
BASOPHILS NFR BLD: 0 % (ref 0–1.9)
BASOPHILS NFR BLD: 0.1 % (ref 0–1.9)
BASOPHILS NFR BLD: 0.1 % (ref 0–1.9)
BASOPHILS NFR BLD: 0.4 % (ref 0–1.9)
BASOPHILS NFR BLD: 0.5 % (ref 0–1.9)
BASOPHILS NFR BLD: 0.6 % (ref 0–1.9)
BENZODIAZ UR QL SCN>200 NG/ML: NEGATIVE
BILIRUB SERPL-MCNC: 0.9 MG/DL (ref 0.1–1)
BILIRUB SERPL-MCNC: 0.9 MG/DL (ref 0.1–1)
BILIRUB SERPL-MCNC: 1.1 MG/DL (ref 0.1–1)
BILIRUB SERPL-MCNC: 1.2 MG/DL (ref 0.1–1)
BILIRUB SERPL-MCNC: 1.3 MG/DL (ref 0.1–1)
BILIRUB SERPL-MCNC: 1.5 MG/DL (ref 0.1–1)
BILIRUB SERPL-MCNC: 1.8 MG/DL (ref 0.1–1)
BILIRUB SERPL-MCNC: 2.6 MG/DL (ref 0.1–1)
BILIRUB UR QL STRIP: NEGATIVE
BLD GP AB SCN CELLS X3 SERPL QL: NORMAL
BLD PROD TYP BPU: NORMAL
BLOOD UNIT EXPIRATION DATE: NORMAL
BLOOD UNIT TYPE CODE: 1700
BLOOD UNIT TYPE CODE: 6200
BLOOD UNIT TYPE: NORMAL
BNP SERPL-MCNC: 172 PG/ML (ref 0–99)
BNP SERPL-MCNC: 199 PG/ML (ref 0–99)
BNP SERPL-MCNC: 250 PG/ML (ref 0–99)
BNP SERPL-MCNC: 540 PG/ML (ref 0–99)
BNP SERPL-MCNC: 80 PG/ML (ref 0–99)
BSA FOR ECHO PROCEDURE: 2.18 M2
BUN SERPL-MCNC: 11 MG/DL (ref 8–23)
BUN SERPL-MCNC: 12 MG/DL (ref 8–23)
BUN SERPL-MCNC: 15 MG/DL (ref 8–23)
BUN SERPL-MCNC: 16 MG/DL (ref 8–23)
BUN SERPL-MCNC: 16 MG/DL (ref 8–23)
BUN SERPL-MCNC: 18 MG/DL (ref 8–23)
BUN SERPL-MCNC: 19 MG/DL (ref 8–23)
BUN SERPL-MCNC: 20 MG/DL (ref 8–23)
BUN SERPL-MCNC: 21 MG/DL (ref 8–23)
BUN SERPL-MCNC: 22 MG/DL (ref 8–23)
BUN SERPL-MCNC: 25 MG/DL (ref 8–23)
BUN SERPL-MCNC: 26 MG/DL (ref 8–23)
BUN SERPL-MCNC: 27 MG/DL (ref 8–23)
BUN SERPL-MCNC: 28 MG/DL (ref 8–23)
BUN SERPL-MCNC: 28 MG/DL (ref 8–23)
BUN SERPL-MCNC: 74 MG/DL (ref 8–23)
BZE UR QL SCN: NEGATIVE
CALCIUM SERPL-MCNC: 10.1 MG/DL (ref 8.7–10.5)
CALCIUM SERPL-MCNC: 8.2 MG/DL (ref 8.7–10.5)
CALCIUM SERPL-MCNC: 8.7 MG/DL (ref 8.7–10.5)
CALCIUM SERPL-MCNC: 8.8 MG/DL (ref 8.7–10.5)
CALCIUM SERPL-MCNC: 9 MG/DL (ref 8.7–10.5)
CALCIUM SERPL-MCNC: 9 MG/DL (ref 8.7–10.5)
CALCIUM SERPL-MCNC: 9.1 MG/DL (ref 8.7–10.5)
CALCIUM SERPL-MCNC: 9.2 MG/DL (ref 8.7–10.5)
CALCIUM SERPL-MCNC: 9.3 MG/DL (ref 8.7–10.5)
CALCIUM SERPL-MCNC: 9.4 MG/DL (ref 8.7–10.5)
CALCIUM SERPL-MCNC: 9.4 MG/DL (ref 8.7–10.5)
CALCIUM SERPL-MCNC: 9.5 MG/DL (ref 8.7–10.5)
CALCIUM SERPL-MCNC: 9.6 MG/DL (ref 8.7–10.5)
CALCIUM SERPL-MCNC: 9.7 MG/DL (ref 8.7–10.5)
CANNABINOIDS UR QL SCN: NEGATIVE
CHLORIDE SERPL-SCNC: 101 MMOL/L (ref 95–110)
CHLORIDE SERPL-SCNC: 93 MMOL/L (ref 95–110)
CHLORIDE SERPL-SCNC: 93 MMOL/L (ref 95–110)
CHLORIDE SERPL-SCNC: 94 MMOL/L (ref 95–110)
CHLORIDE SERPL-SCNC: 94 MMOL/L (ref 95–110)
CHLORIDE SERPL-SCNC: 95 MMOL/L (ref 95–110)
CHLORIDE SERPL-SCNC: 96 MMOL/L (ref 95–110)
CHLORIDE SERPL-SCNC: 96 MMOL/L (ref 95–110)
CHLORIDE SERPL-SCNC: 97 MMOL/L (ref 95–110)
CHLORIDE SERPL-SCNC: 97 MMOL/L (ref 95–110)
CHLORIDE SERPL-SCNC: 98 MMOL/L (ref 95–110)
CHLORIDE SERPL-SCNC: 98 MMOL/L (ref 95–110)
CHLORIDE SERPL-SCNC: 99 MMOL/L (ref 95–110)
CK SERPL-CCNC: 54 U/L (ref 20–180)
CLARITY UR: CLEAR
CO2 SERPL-SCNC: 20 MMOL/L (ref 23–29)
CO2 SERPL-SCNC: 25 MMOL/L (ref 23–29)
CO2 SERPL-SCNC: 29 MMOL/L (ref 23–29)
CO2 SERPL-SCNC: 29 MMOL/L (ref 23–29)
CO2 SERPL-SCNC: 30 MMOL/L (ref 23–29)
CO2 SERPL-SCNC: 30 MMOL/L (ref 23–29)
CO2 SERPL-SCNC: 31 MMOL/L (ref 23–29)
CO2 SERPL-SCNC: 32 MMOL/L (ref 23–29)
CO2 SERPL-SCNC: 33 MMOL/L (ref 23–29)
CO2 SERPL-SCNC: 34 MMOL/L (ref 23–29)
CO2 SERPL-SCNC: 34 MMOL/L (ref 23–29)
CO2 SERPL-SCNC: 36 MMOL/L (ref 23–29)
CO2 SERPL-SCNC: 36 MMOL/L (ref 23–29)
CO2 SERPL-SCNC: 37 MMOL/L (ref 23–29)
CODING SYSTEM: NORMAL
COLOR UR: YELLOW
CREAT SERPL-MCNC: 0.7 MG/DL (ref 0.5–1.4)
CREAT SERPL-MCNC: 0.8 MG/DL (ref 0.5–1.4)
CREAT SERPL-MCNC: 0.9 MG/DL (ref 0.5–1.4)
CREAT SERPL-MCNC: 0.9 MG/DL (ref 0.5–1.4)
CREAT SERPL-MCNC: 1 MG/DL (ref 0.5–1.4)
CREAT SERPL-MCNC: 1 MG/DL (ref 0.5–1.4)
CREAT SERPL-MCNC: 1.1 MG/DL (ref 0.5–1.4)
CREAT SERPL-MCNC: 1.3 MG/DL (ref 0.5–1.4)
CREAT UR-MCNC: 26.3 MG/DL (ref 15–325)
CROSSMATCH INTERPRETATION: NORMAL
CV ECHO LV RWT: 0.5 CM
DELSYS: ABNORMAL
DIFFERENTIAL METHOD: ABNORMAL
DISPENSE STATUS: NORMAL
ECHO LV POSTERIOR WALL: 1.31 CM (ref 0.6–1.1)
EJECTION FRACTION: 45 %
ENTEROVIRUS/RHINOVIRUS: NOT DETECTED
EOSINOPHIL # BLD AUTO: 0 K/UL (ref 0–0.5)
EOSINOPHIL # BLD AUTO: 0.1 K/UL (ref 0–0.5)
EOSINOPHIL # BLD AUTO: 0.2 K/UL (ref 0–0.5)
EOSINOPHIL # BLD AUTO: 0.3 K/UL (ref 0–0.5)
EOSINOPHIL # BLD AUTO: 0.4 K/UL (ref 0–0.5)
EOSINOPHIL # BLD AUTO: ABNORMAL K/UL (ref 0–0.5)
EOSINOPHIL NFR BLD: 0 % (ref 0–8)
EOSINOPHIL NFR BLD: 0.4 % (ref 0–8)
EOSINOPHIL NFR BLD: 0.9 % (ref 0–8)
EOSINOPHIL NFR BLD: 1.1 % (ref 0–8)
EOSINOPHIL NFR BLD: 1.3 % (ref 0–8)
EOSINOPHIL NFR BLD: 1.8 % (ref 0–8)
EOSINOPHIL NFR BLD: 2.3 % (ref 0–8)
EOSINOPHIL NFR BLD: 2.5 % (ref 0–8)
EOSINOPHIL NFR BLD: 2.5 % (ref 0–8)
EOSINOPHIL NFR BLD: 3.1 % (ref 0–8)
EOSINOPHIL NFR BLD: 3.1 % (ref 0–8)
EOSINOPHIL NFR BLD: 3.9 % (ref 0–8)
ERYTHROCYTE [DISTWIDTH] IN BLOOD BY AUTOMATED COUNT: 19.5 % (ref 11.5–14.5)
ERYTHROCYTE [DISTWIDTH] IN BLOOD BY AUTOMATED COUNT: 19.5 % (ref 11.5–14.5)
ERYTHROCYTE [DISTWIDTH] IN BLOOD BY AUTOMATED COUNT: 19.7 % (ref 11.5–14.5)
ERYTHROCYTE [DISTWIDTH] IN BLOOD BY AUTOMATED COUNT: 19.9 % (ref 11.5–14.5)
ERYTHROCYTE [DISTWIDTH] IN BLOOD BY AUTOMATED COUNT: 19.9 % (ref 11.5–14.5)
ERYTHROCYTE [DISTWIDTH] IN BLOOD BY AUTOMATED COUNT: 20 % (ref 11.5–14.5)
ERYTHROCYTE [DISTWIDTH] IN BLOOD BY AUTOMATED COUNT: 20.1 % (ref 11.5–14.5)
ERYTHROCYTE [DISTWIDTH] IN BLOOD BY AUTOMATED COUNT: 21.3 % (ref 11.5–14.5)
ERYTHROCYTE [DISTWIDTH] IN BLOOD BY AUTOMATED COUNT: 21.5 % (ref 11.5–14.5)
ERYTHROCYTE [DISTWIDTH] IN BLOOD BY AUTOMATED COUNT: 25.1 % (ref 11.5–14.5)
ERYTHROCYTE [DISTWIDTH] IN BLOOD BY AUTOMATED COUNT: 26.5 % (ref 11.5–14.5)
ERYTHROCYTE [DISTWIDTH] IN BLOOD BY AUTOMATED COUNT: 27.5 % (ref 11.5–14.5)
ERYTHROCYTE [DISTWIDTH] IN BLOOD BY AUTOMATED COUNT: 28.6 % (ref 11.5–14.5)
ERYTHROCYTE [DISTWIDTH] IN BLOOD BY AUTOMATED COUNT: ABNORMAL % (ref 11.5–14.5)
EST. GFR  (NO RACE VARIABLE): 44.5 ML/MIN/1.73 M^2
EST. GFR  (NO RACE VARIABLE): 54.7 ML/MIN/1.73 M^2
EST. GFR  (NO RACE VARIABLE): >60 ML/MIN/1.73 M^2
ESTIMATED AVG GLUCOSE: 114 MG/DL (ref 68–131)
FERRITIN SERPL-MCNC: 8 NG/ML (ref 20–300)
FIO2: 21
FIO2: 28
FIO2: 36
FLOW: 2
FLOW: 4
FOLATE SERPL-MCNC: 14.6 NG/ML (ref 4–24)
FRACTIONAL SHORTENING: 14 % (ref 28–44)
GLUCOSE SERPL-MCNC: 104 MG/DL (ref 70–110)
GLUCOSE SERPL-MCNC: 106 MG/DL (ref 70–110)
GLUCOSE SERPL-MCNC: 109 MG/DL (ref 70–110)
GLUCOSE SERPL-MCNC: 113 MG/DL (ref 70–110)
GLUCOSE SERPL-MCNC: 113 MG/DL (ref 70–110)
GLUCOSE SERPL-MCNC: 116 MG/DL (ref 70–110)
GLUCOSE SERPL-MCNC: 116 MG/DL (ref 70–110)
GLUCOSE SERPL-MCNC: 117 MG/DL (ref 70–110)
GLUCOSE SERPL-MCNC: 122 MG/DL (ref 70–110)
GLUCOSE SERPL-MCNC: 165 MG/DL (ref 70–110)
GLUCOSE SERPL-MCNC: 82 MG/DL (ref 70–110)
GLUCOSE SERPL-MCNC: 85 MG/DL (ref 70–110)
GLUCOSE SERPL-MCNC: 89 MG/DL (ref 70–110)
GLUCOSE SERPL-MCNC: 92 MG/DL (ref 70–110)
GLUCOSE SERPL-MCNC: 92 MG/DL (ref 70–110)
GLUCOSE SERPL-MCNC: 96 MG/DL (ref 70–110)
GLUCOSE SERPL-MCNC: 98 MG/DL (ref 70–110)
GLUCOSE SERPL-MCNC: 98 MG/DL (ref 70–110)
GLUCOSE UR QL STRIP: NEGATIVE
GRAM STN SPEC: NORMAL
HBA1C MFR BLD: 5.6 % (ref 4–5.6)
HCO3 UR-SCNC: 34.8 MMOL/L (ref 24–28)
HCO3 UR-SCNC: 38 MMOL/L (ref 24–28)
HCO3 UR-SCNC: 40.5 MMOL/L (ref 24–28)
HCT VFR BLD AUTO: 16.5 % (ref 37–48.5)
HCT VFR BLD AUTO: 18.7 % (ref 37–48.5)
HCT VFR BLD AUTO: 19.9 % (ref 37–48.5)
HCT VFR BLD AUTO: 20.8 % (ref 37–48.5)
HCT VFR BLD AUTO: 24.7 % (ref 37–48.5)
HCT VFR BLD AUTO: 26.4 % (ref 37–48.5)
HCT VFR BLD AUTO: 26.6 % (ref 37–48.5)
HCT VFR BLD AUTO: 26.9 % (ref 37–48.5)
HCT VFR BLD AUTO: 27.7 % (ref 37–48.5)
HCT VFR BLD AUTO: 29.1 % (ref 37–48.5)
HCT VFR BLD AUTO: 29.2 % (ref 37–48.5)
HCT VFR BLD AUTO: 29.3 % (ref 37–48.5)
HCT VFR BLD AUTO: 30 % (ref 37–48.5)
HCT VFR BLD AUTO: 30.6 % (ref 37–48.5)
HCT VFR BLD AUTO: 30.9 % (ref 37–48.5)
HCT VFR BLD AUTO: 31 % (ref 37–48.5)
HCT VFR BLD AUTO: 31.4 % (ref 37–48.5)
HCT VFR BLD AUTO: 33.2 % (ref 37–48.5)
HCV AB SERPL QL IA: NORMAL
HGB BLD-MCNC: 4.4 G/DL (ref 12–16)
HGB BLD-MCNC: 4.5 G/DL (ref 12–16)
HGB BLD-MCNC: 5.1 G/DL (ref 12–16)
HGB BLD-MCNC: 5.4 G/DL (ref 12–16)
HGB BLD-MCNC: 6.8 G/DL (ref 12–16)
HGB BLD-MCNC: 6.9 G/DL (ref 12–16)
HGB BLD-MCNC: 7.1 G/DL (ref 12–16)
HGB BLD-MCNC: 7.3 G/DL (ref 12–16)
HGB BLD-MCNC: 7.6 G/DL (ref 12–16)
HGB BLD-MCNC: 7.6 G/DL (ref 12–16)
HGB BLD-MCNC: 7.7 G/DL (ref 12–16)
HGB BLD-MCNC: 7.8 G/DL (ref 12–16)
HGB BLD-MCNC: 7.9 G/DL (ref 12–16)
HGB BLD-MCNC: 8.1 G/DL (ref 12–16)
HGB BLD-MCNC: 8.1 G/DL (ref 12–16)
HGB BLD-MCNC: 8.3 G/DL (ref 12–16)
HGB BLD-MCNC: 8.9 G/DL (ref 12–16)
HGB BLD-MCNC: 9.2 G/DL (ref 12–16)
HGB UR QL STRIP: ABNORMAL
HGB UR QL STRIP: ABNORMAL
HGB UR QL STRIP: NEGATIVE
HIV 1+2 AB+HIV1 P24 AG SERPL QL IA: NORMAL
HUMAN BOCAVIRUS: NOT DETECTED
HUMAN CORONAVIRUS, COMMON COLD VIRUS: NOT DETECTED
IMM GRANULOCYTES # BLD AUTO: 0 K/UL (ref 0–0.04)
IMM GRANULOCYTES # BLD AUTO: 0.02 K/UL (ref 0–0.04)
IMM GRANULOCYTES # BLD AUTO: 0.04 K/UL (ref 0–0.04)
IMM GRANULOCYTES # BLD AUTO: 0.06 K/UL (ref 0–0.04)
IMM GRANULOCYTES # BLD AUTO: 0.06 K/UL (ref 0–0.04)
IMM GRANULOCYTES # BLD AUTO: 0.07 K/UL (ref 0–0.04)
IMM GRANULOCYTES # BLD AUTO: ABNORMAL K/UL (ref 0–0.04)
IMM GRANULOCYTES NFR BLD AUTO: 0 % (ref 0–0.5)
IMM GRANULOCYTES NFR BLD AUTO: 0.2 % (ref 0–0.5)
IMM GRANULOCYTES NFR BLD AUTO: 0.3 % (ref 0–0.5)
IMM GRANULOCYTES NFR BLD AUTO: 0.4 % (ref 0–0.5)
IMM GRANULOCYTES NFR BLD AUTO: 0.4 % (ref 0–0.5)
IMM GRANULOCYTES NFR BLD AUTO: 0.5 % (ref 0–0.5)
IMM GRANULOCYTES NFR BLD AUTO: 0.5 % (ref 0–0.5)
IMM GRANULOCYTES NFR BLD AUTO: 0.6 % (ref 0–0.5)
IMM GRANULOCYTES NFR BLD AUTO: 0.7 % (ref 0–0.5)
IMM GRANULOCYTES NFR BLD AUTO: ABNORMAL % (ref 0–0.5)
INFLUENZA A - H1N1-09: NOT DETECTED
INFLUENZA A, MOLECULAR: NEGATIVE
INFLUENZA A, MOLECULAR: NEGATIVE
INFLUENZA B, MOLECULAR: NEGATIVE
INFLUENZA B, MOLECULAR: NEGATIVE
INR PPP: 1 (ref 0.8–1.2)
INTERVENTRICULAR SEPTUM: 1.22 CM (ref 0.6–1.1)
IRON SERPL-MCNC: 15 UG/DL (ref 30–160)
KETONES UR QL STRIP: NEGATIVE
LACTATE SERPL-SCNC: 0.7 MMOL/L (ref 0.5–2.2)
LACTATE SERPL-SCNC: 1.8 MMOL/L (ref 0.5–2.2)
LACTATE SERPL-SCNC: 2.5 MMOL/L (ref 0.5–2.2)
LACTATE SERPL-SCNC: 2.7 MMOL/L (ref 0.5–2.2)
LEFT ATRIUM SIZE: 5.35 CM
LEFT INTERNAL DIMENSION IN SYSTOLE: 4.55 CM (ref 2.1–4)
LEFT VENTRICLE DIASTOLIC VOLUME INDEX: 64.98 ML/M2
LEFT VENTRICLE DIASTOLIC VOLUME: 134.5 ML
LEFT VENTRICLE MASS INDEX: 133 G/M2
LEFT VENTRICLE SYSTOLIC VOLUME INDEX: 45.8 ML/M2
LEFT VENTRICLE SYSTOLIC VOLUME: 94.9 ML
LEFT VENTRICULAR INTERNAL DIMENSION IN DIASTOLE: 5.29 CM (ref 3.5–6)
LEFT VENTRICULAR MASS: 275.31 G
LEGIONELLA PNEUMOPHILA: NOT DETECTED
LEUKOCYTE ESTERASE UR QL STRIP: ABNORMAL
LEUKOCYTE ESTERASE UR QL STRIP: ABNORMAL
LEUKOCYTE ESTERASE UR QL STRIP: NEGATIVE
LYMPHOCYTES # BLD AUTO: 1.5 K/UL (ref 1–4.8)
LYMPHOCYTES # BLD AUTO: 1.5 K/UL (ref 1–4.8)
LYMPHOCYTES # BLD AUTO: 1.7 K/UL (ref 1–4.8)
LYMPHOCYTES # BLD AUTO: 1.8 K/UL (ref 1–4.8)
LYMPHOCYTES # BLD AUTO: 1.8 K/UL (ref 1–4.8)
LYMPHOCYTES # BLD AUTO: 2 K/UL (ref 1–4.8)
LYMPHOCYTES # BLD AUTO: 2.1 K/UL (ref 1–4.8)
LYMPHOCYTES # BLD AUTO: 2.2 K/UL (ref 1–4.8)
LYMPHOCYTES # BLD AUTO: 2.3 K/UL (ref 1–4.8)
LYMPHOCYTES # BLD AUTO: 2.3 K/UL (ref 1–4.8)
LYMPHOCYTES # BLD AUTO: 2.5 K/UL (ref 1–4.8)
LYMPHOCYTES # BLD AUTO: ABNORMAL K/UL (ref 1–4.8)
LYMPHOCYTES NFR BLD: 14 % (ref 18–48)
LYMPHOCYTES NFR BLD: 14.6 % (ref 18–48)
LYMPHOCYTES NFR BLD: 15.7 % (ref 18–48)
LYMPHOCYTES NFR BLD: 18.7 % (ref 18–48)
LYMPHOCYTES NFR BLD: 19.7 % (ref 18–48)
LYMPHOCYTES NFR BLD: 20.7 % (ref 18–48)
LYMPHOCYTES NFR BLD: 20.9 % (ref 18–48)
LYMPHOCYTES NFR BLD: 21.2 % (ref 18–48)
LYMPHOCYTES NFR BLD: 22.8 % (ref 18–48)
LYMPHOCYTES NFR BLD: 25.7 % (ref 18–48)
LYMPHOCYTES NFR BLD: 26.9 % (ref 18–48)
LYMPHOCYTES NFR BLD: 31.8 % (ref 18–48)
MAGNESIUM SERPL-MCNC: 1.9 MG/DL (ref 1.6–2.6)
MAGNESIUM SERPL-MCNC: 2 MG/DL (ref 1.6–2.6)
MAGNESIUM SERPL-MCNC: 2.1 MG/DL (ref 1.6–2.6)
MAGNESIUM SERPL-MCNC: 2.4 MG/DL (ref 1.6–2.6)
MCH RBC QN AUTO: 15.1 PG (ref 27–31)
MCH RBC QN AUTO: 17.2 PG (ref 27–31)
MCH RBC QN AUTO: 17.3 PG (ref 27–31)
MCH RBC QN AUTO: 17.5 PG (ref 27–31)
MCH RBC QN AUTO: 17.6 PG (ref 27–31)
MCH RBC QN AUTO: 17.7 PG (ref 27–31)
MCH RBC QN AUTO: 17.8 PG (ref 27–31)
MCH RBC QN AUTO: 17.8 PG (ref 27–31)
MCH RBC QN AUTO: 17.9 PG (ref 27–31)
MCH RBC QN AUTO: 17.9 PG (ref 27–31)
MCH RBC QN AUTO: 18 PG (ref 27–31)
MCH RBC QN AUTO: 20 PG (ref 27–31)
MCH RBC QN AUTO: 20.4 PG (ref 27–31)
MCH RBC QN AUTO: 21.3 PG (ref 27–31)
MCHC RBC AUTO-ENTMCNC: 24.1 G/DL (ref 32–36)
MCHC RBC AUTO-ENTMCNC: 25.6 G/DL (ref 32–36)
MCHC RBC AUTO-ENTMCNC: 25.7 G/DL (ref 32–36)
MCHC RBC AUTO-ENTMCNC: 25.8 G/DL (ref 32–36)
MCHC RBC AUTO-ENTMCNC: 25.8 G/DL (ref 32–36)
MCHC RBC AUTO-ENTMCNC: 26 G/DL (ref 32–36)
MCHC RBC AUTO-ENTMCNC: 26.1 G/DL (ref 32–36)
MCHC RBC AUTO-ENTMCNC: 26.2 G/DL (ref 32–36)
MCHC RBC AUTO-ENTMCNC: 26.4 G/DL (ref 32–36)
MCHC RBC AUTO-ENTMCNC: 26.6 G/DL (ref 32–36)
MCHC RBC AUTO-ENTMCNC: 26.7 G/DL (ref 32–36)
MCHC RBC AUTO-ENTMCNC: 26.7 G/DL (ref 32–36)
MCHC RBC AUTO-ENTMCNC: 27.7 G/DL (ref 32–36)
MCHC RBC AUTO-ENTMCNC: 28.7 G/DL (ref 32–36)
MCV RBC AUTO: 63 FL (ref 82–98)
MCV RBC AUTO: 66 FL (ref 82–98)
MCV RBC AUTO: 67 FL (ref 82–98)
MCV RBC AUTO: 67 FL (ref 82–98)
MCV RBC AUTO: 68 FL (ref 82–98)
MCV RBC AUTO: 69 FL (ref 82–98)
MCV RBC AUTO: 69 FL (ref 82–98)
MCV RBC AUTO: 71 FL (ref 82–98)
MCV RBC AUTO: 72 FL (ref 82–98)
MCV RBC AUTO: 80 FL (ref 82–98)
METHADONE UR QL SCN>300 NG/ML: NEGATIVE
MICROSCOPIC COMMENT: ABNORMAL
MICROSCOPIC COMMENT: NORMAL
MODE: ABNORMAL
MONOCYTES # BLD AUTO: 0.3 K/UL (ref 0.3–1)
MONOCYTES # BLD AUTO: 0.4 K/UL (ref 0.3–1)
MONOCYTES # BLD AUTO: 0.5 K/UL (ref 0.3–1)
MONOCYTES # BLD AUTO: 0.6 K/UL (ref 0.3–1)
MONOCYTES # BLD AUTO: 0.6 K/UL (ref 0.3–1)
MONOCYTES # BLD AUTO: 0.7 K/UL (ref 0.3–1)
MONOCYTES # BLD AUTO: 0.7 K/UL (ref 0.3–1)
MONOCYTES # BLD AUTO: 0.9 K/UL (ref 0.3–1)
MONOCYTES # BLD AUTO: 0.9 K/UL (ref 0.3–1)
MONOCYTES # BLD AUTO: ABNORMAL K/UL (ref 0.3–1)
MONOCYTES NFR BLD: 2 % (ref 4–15)
MONOCYTES NFR BLD: 5 % (ref 4–15)
MONOCYTES NFR BLD: 5.2 % (ref 4–15)
MONOCYTES NFR BLD: 5.3 % (ref 4–15)
MONOCYTES NFR BLD: 5.4 % (ref 4–15)
MONOCYTES NFR BLD: 6.3 % (ref 4–15)
MONOCYTES NFR BLD: 6.5 % (ref 4–15)
MONOCYTES NFR BLD: 6.6 % (ref 4–15)
MONOCYTES NFR BLD: 6.6 % (ref 4–15)
MONOCYTES NFR BLD: 6.7 % (ref 4–15)
MONOCYTES NFR BLD: 7.2 % (ref 4–15)
MONOCYTES NFR BLD: 8.4 % (ref 4–15)
MORAXELLA CATARRHALIS: NOT DETECTED
NEUTROPHILS # BLD AUTO: 4.1 K/UL (ref 1.8–7.7)
NEUTROPHILS # BLD AUTO: 4.6 K/UL (ref 1.8–7.7)
NEUTROPHILS # BLD AUTO: 5.6 K/UL (ref 1.8–7.7)
NEUTROPHILS # BLD AUTO: 6.1 K/UL (ref 1.8–7.7)
NEUTROPHILS # BLD AUTO: 6.2 K/UL (ref 1.8–7.7)
NEUTROPHILS # BLD AUTO: 6.6 K/UL (ref 1.8–7.7)
NEUTROPHILS # BLD AUTO: 7 K/UL (ref 1.8–7.7)
NEUTROPHILS # BLD AUTO: 7.1 K/UL (ref 1.8–7.7)
NEUTROPHILS # BLD AUTO: 7.4 K/UL (ref 1.8–7.7)
NEUTROPHILS # BLD AUTO: 7.8 K/UL (ref 1.8–7.7)
NEUTROPHILS # BLD AUTO: 8.6 K/UL (ref 1.8–7.7)
NEUTROPHILS NFR BLD: 57.8 % (ref 38–73)
NEUTROPHILS NFR BLD: 64.1 % (ref 38–73)
NEUTROPHILS NFR BLD: 64.3 % (ref 38–73)
NEUTROPHILS NFR BLD: 66 % (ref 38–73)
NEUTROPHILS NFR BLD: 68.5 % (ref 38–73)
NEUTROPHILS NFR BLD: 69.8 % (ref 38–73)
NEUTROPHILS NFR BLD: 71.4 % (ref 38–73)
NEUTROPHILS NFR BLD: 71.7 % (ref 38–73)
NEUTROPHILS NFR BLD: 72.7 % (ref 38–73)
NEUTROPHILS NFR BLD: 77.1 % (ref 38–73)
NEUTROPHILS NFR BLD: 77.1 % (ref 38–73)
NEUTROPHILS NFR BLD: 84 % (ref 38–73)
NITRITE UR QL STRIP: NEGATIVE
NRBC BLD-RTO: 0 /100 WBC
NRBC BLD-RTO: 1 /100 WBC
NRBC BLD-RTO: 1 /100 WBC
NUM UNITS TRANS PACKED RBC: NORMAL
OB PNL STL: POSITIVE
OB PNL STL: POSITIVE
OPIATES UR QL SCN: NEGATIVE
PARAINFLUENZA: NOT DETECTED
PCO2 BLDA: 48.7 MMHG (ref 35–45)
PCO2 BLDA: 55.1 MMHG (ref 35–45)
PCO2 BLDA: 61 MMHG (ref 35–45)
PCP UR QL SCN>25 NG/ML: NEGATIVE
PH SMN: 7.4 [PH] (ref 7.35–7.45)
PH SMN: 7.46 [PH] (ref 7.35–7.45)
PH SMN: 7.47 [PH] (ref 7.35–7.45)
PH UR STRIP: 7 [PH] (ref 5–8)
PHOSPHATE SERPL-MCNC: 2.6 MG/DL (ref 2.7–4.5)
PHOSPHATE SERPL-MCNC: 3.9 MG/DL (ref 2.7–4.5)
PISA TR MAX VEL: 3.25 M/S
PLATELET # BLD AUTO: 260 K/UL (ref 150–450)
PLATELET # BLD AUTO: 266 K/UL (ref 150–450)
PLATELET # BLD AUTO: 278 K/UL (ref 150–450)
PLATELET # BLD AUTO: 283 K/UL (ref 150–450)
PLATELET # BLD AUTO: 284 K/UL (ref 150–450)
PLATELET # BLD AUTO: 345 K/UL (ref 150–450)
PLATELET # BLD AUTO: 360 K/UL (ref 150–450)
PLATELET # BLD AUTO: 379 K/UL (ref 150–450)
PLATELET # BLD AUTO: 383 K/UL (ref 150–450)
PLATELET # BLD AUTO: 389 K/UL (ref 150–450)
PLATELET # BLD AUTO: 421 K/UL (ref 150–450)
PLATELET # BLD AUTO: 437 K/UL (ref 150–450)
PLATELET # BLD AUTO: 441 K/UL (ref 150–450)
PLATELET # BLD AUTO: 456 K/UL (ref 150–450)
PMV BLD AUTO: 8.7 FL (ref 9.2–12.9)
PMV BLD AUTO: 8.8 FL (ref 9.2–12.9)
PMV BLD AUTO: 8.8 FL (ref 9.2–12.9)
PMV BLD AUTO: 8.9 FL (ref 9.2–12.9)
PMV BLD AUTO: 9.1 FL (ref 9.2–12.9)
PMV BLD AUTO: 9.2 FL (ref 9.2–12.9)
PMV BLD AUTO: 9.4 FL (ref 9.2–12.9)
PMV BLD AUTO: 9.5 FL (ref 9.2–12.9)
PMV BLD AUTO: 9.5 FL (ref 9.2–12.9)
PMV BLD AUTO: 9.6 FL (ref 9.2–12.9)
PMV BLD AUTO: 9.7 FL (ref 9.2–12.9)
PMV BLD AUTO: 9.8 FL (ref 9.2–12.9)
PMV BLD AUTO: 9.9 FL (ref 9.2–12.9)
PMV BLD AUTO: 9.9 FL (ref 9.2–12.9)
PO2 BLDA: 44 MMHG (ref 80–100)
PO2 BLDA: 61 MMHG (ref 80–100)
PO2 BLDA: 65 MMHG (ref 80–100)
POC BE: 11 MMOL/L
POC BE: 13 MMOL/L
POC BE: 17 MMOL/L
POC SATURATED O2: 81 % (ref 95–100)
POC SATURATED O2: 90 % (ref 95–100)
POC SATURATED O2: 93 % (ref 95–100)
POCT GLUCOSE: 127 MG/DL (ref 70–110)
POIKILOCYTOSIS BLD QL SMEAR: SLIGHT
POLYCHROMASIA BLD QL SMEAR: ABNORMAL
POTASSIUM SERPL-SCNC: 2.8 MMOL/L (ref 3.5–5.1)
POTASSIUM SERPL-SCNC: 3.4 MMOL/L (ref 3.5–5.1)
POTASSIUM SERPL-SCNC: 3.5 MMOL/L (ref 3.5–5.1)
POTASSIUM SERPL-SCNC: 3.6 MMOL/L (ref 3.5–5.1)
POTASSIUM SERPL-SCNC: 3.6 MMOL/L (ref 3.5–5.1)
POTASSIUM SERPL-SCNC: 3.7 MMOL/L (ref 3.5–5.1)
POTASSIUM SERPL-SCNC: 3.8 MMOL/L (ref 3.5–5.1)
POTASSIUM SERPL-SCNC: 3.9 MMOL/L (ref 3.5–5.1)
POTASSIUM SERPL-SCNC: 3.9 MMOL/L (ref 3.5–5.1)
POTASSIUM SERPL-SCNC: 5.1 MMOL/L (ref 3.5–5.1)
PROCALCITONIN SERPL IA-MCNC: 0.15 NG/ML
PROT SERPL-MCNC: 5.6 G/DL (ref 6–8.4)
PROT SERPL-MCNC: 5.7 G/DL (ref 6–8.4)
PROT SERPL-MCNC: 6.3 G/DL (ref 6–8.4)
PROT SERPL-MCNC: 6.4 G/DL (ref 6–8.4)
PROT SERPL-MCNC: 6.4 G/DL (ref 6–8.4)
PROT SERPL-MCNC: 6.5 G/DL (ref 6–8.4)
PROT SERPL-MCNC: 6.6 G/DL (ref 6–8.4)
PROT SERPL-MCNC: 6.9 G/DL (ref 6–8.4)
PROT SERPL-MCNC: 7.2 G/DL (ref 6–8.4)
PROT SERPL-MCNC: 7.7 G/DL (ref 6–8.4)
PROT UR QL STRIP: NEGATIVE
PROTHROMBIN TIME: 11 SEC (ref 9–12.5)
RA PRESSURE: 3 MMHG
RBC # BLD AUTO: 2.07 M/UL (ref 4–5.4)
RBC # BLD AUTO: 2.97 M/UL (ref 4–5.4)
RBC # BLD AUTO: 2.98 M/UL (ref 4–5.4)
RBC # BLD AUTO: 3.89 M/UL (ref 4–5.4)
RBC # BLD AUTO: 3.99 M/UL (ref 4–5.4)
RBC # BLD AUTO: 4.29 M/UL (ref 4–5.4)
RBC # BLD AUTO: 4.31 M/UL (ref 4–5.4)
RBC # BLD AUTO: 4.33 M/UL (ref 4–5.4)
RBC # BLD AUTO: 4.34 M/UL (ref 4–5.4)
RBC # BLD AUTO: 4.36 M/UL (ref 4–5.4)
RBC # BLD AUTO: 4.42 M/UL (ref 4–5.4)
RBC # BLD AUTO: 4.61 M/UL (ref 4–5.4)
RBC # BLD AUTO: 4.64 M/UL (ref 4–5.4)
RBC # BLD AUTO: 4.67 M/UL (ref 4–5.4)
RBC #/AREA URNS HPF: 1 /HPF (ref 0–4)
RBC #/AREA URNS HPF: 2 /HPF (ref 0–4)
RIGHT VENTRICULAR END-DIASTOLIC DIMENSION: 3.74 CM
RVP - ADENOVIRUS: NOT DETECTED
RVP - HUMAN METAPNEUMOVIRUS (HMPV): NOT DETECTED
RVP - INFLUENZA A: NOT DETECTED
RVP - INFLUENZA B: NOT DETECTED
RVP - RESPIRATORY SYNCTIAL VIRUS (RSV) A: NOT DETECTED
RVP - RESPIRATORY VIRAL PANEL, SOURCE: NORMAL
SAMPLE: ABNORMAL
SARS-COV-2 RDRP RESP QL NAA+PROBE: NEGATIVE
SARS-COV-2 RDRP RESP QL NAA+PROBE: NEGATIVE
SARS-COV-2 RNA RESP QL NAA+PROBE: NOT DETECTED
SATURATED IRON: 3 % (ref 20–50)
SITE: ABNORMAL
SODIUM SERPL-SCNC: 135 MMOL/L (ref 136–145)
SODIUM SERPL-SCNC: 135 MMOL/L (ref 136–145)
SODIUM SERPL-SCNC: 136 MMOL/L (ref 136–145)
SODIUM SERPL-SCNC: 137 MMOL/L (ref 136–145)
SODIUM SERPL-SCNC: 139 MMOL/L (ref 136–145)
SODIUM SERPL-SCNC: 140 MMOL/L (ref 136–145)
SODIUM SERPL-SCNC: 140 MMOL/L (ref 136–145)
SODIUM SERPL-SCNC: 141 MMOL/L (ref 136–145)
SODIUM SERPL-SCNC: 141 MMOL/L (ref 136–145)
SODIUM SERPL-SCNC: 144 MMOL/L (ref 136–145)
SODIUM SERPL-SCNC: 144 MMOL/L (ref 136–145)
SP GR UR STRIP: 1.01 (ref 1–1.03)
SP GR UR STRIP: 1.01 (ref 1–1.03)
SP GR UR STRIP: <=1.005 (ref 1–1.03)
SP02: 81
SP02: 94
SPECIMEN OUTDATE: NORMAL
SPECIMEN SOURCE: NORMAL
SPECIMEN SOURCE: NORMAL
SQUAMOUS #/AREA URNS HPF: NORMAL /HPF
T3 SERPL-MCNC: 61 NG/DL (ref 60–180)
T4 FREE SERPL-MCNC: 1.24 NG/DL (ref 0.71–1.51)
TEM - ACINETOBACTER BAUMANNII: NOT DETECTED
TEM - BORDETELLA PERTUSSIS: NOT DETECTED
TEM - CHLAMYDOPHILA PNEUMONIAE: NOT DETECTED
TEM - KLEBSIELLA PNEUMONIAE: NOT DETECTED
TEM - MRSA: NOT DETECTED
TEM - MYCOPLASMA PNEUMONIAE: NOT DETECTED
TEM - NEISSERIA MENINGITIDIS: NOT DETECTED
TEM - PANTON-VALENTINE: NOT DETECTED
TEM - PSEUDOMONAS AERUGINOSA: NOT DETECTED
TEM - STAPHYLOCOCCUS AUREUS: NOT DETECTED
TEM - STREPTOCOCCUS PNEUMONIAE: NOT DETECTED
TEM - STREPTOCOCCUS PYOGENES A: NOT DETECTED
TEM- HAEMOPHILUS INFLUENZAE B: NOT DETECTED
TEM- HAEMOPHILUS INFLUENZAE: NOT DETECTED
TOTAL IRON BINDING CAPACITY: 506 UG/DL (ref 250–450)
TOXICOLOGY INFORMATION: NORMAL
TR MAX PG: 42 MMHG
TRANSFERRIN SERPL-MCNC: 342 MG/DL (ref 200–375)
TROPONIN I SERPL DL<=0.01 NG/ML-MCNC: 0.01 NG/ML (ref 0–0.03)
TROPONIN I SERPL DL<=0.01 NG/ML-MCNC: 0.02 NG/ML (ref 0–0.03)
TROPONIN I SERPL DL<=0.01 NG/ML-MCNC: 0.03 NG/ML (ref 0–0.03)
TROPONIN I SERPL DL<=0.01 NG/ML-MCNC: 0.04 NG/ML (ref 0–0.03)
TROPONIN I SERPL DL<=0.01 NG/ML-MCNC: 0.04 NG/ML (ref 0–0.03)
TSH SERPL DL<=0.005 MIU/L-ACNC: 0.69 UIU/ML (ref 0.4–4)
TV REST PULMONARY ARTERY PRESSURE: 45 MMHG
URN SPEC COLLECT METH UR: ABNORMAL
URN SPEC COLLECT METH UR: ABNORMAL
URN SPEC COLLECT METH UR: NORMAL
UROBILINOGEN UR STRIP-ACNC: 1 EU/DL
UROBILINOGEN UR STRIP-ACNC: NEGATIVE EU/DL
UROBILINOGEN UR STRIP-ACNC: NEGATIVE EU/DL
VIT B12 SERPL-MCNC: 423 PG/ML (ref 210–950)
WBC # BLD AUTO: 10.01 K/UL (ref 3.9–12.7)
WBC # BLD AUTO: 10.09 K/UL (ref 3.9–12.7)
WBC # BLD AUTO: 10.2 K/UL (ref 3.9–12.7)
WBC # BLD AUTO: 11.17 K/UL (ref 3.9–12.7)
WBC # BLD AUTO: 12.06 K/UL (ref 3.9–12.7)
WBC # BLD AUTO: 16.95 K/UL (ref 3.9–12.7)
WBC # BLD AUTO: 6.4 K/UL (ref 3.9–12.7)
WBC # BLD AUTO: 7.86 K/UL (ref 3.9–12.7)
WBC # BLD AUTO: 8 K/UL (ref 3.9–12.7)
WBC # BLD AUTO: 8.54 K/UL (ref 3.9–12.7)
WBC # BLD AUTO: 8.69 K/UL (ref 3.9–12.7)
WBC # BLD AUTO: 8.74 K/UL (ref 3.9–12.7)
WBC # BLD AUTO: 9.57 K/UL (ref 3.9–12.7)
WBC # BLD AUTO: 9.93 K/UL (ref 3.9–12.7)
WBC #/AREA URNS HPF: 25 /HPF (ref 0–5)
WBC #/AREA URNS HPF: 5 /HPF (ref 0–5)

## 2023-01-01 PROCEDURE — 27000221 HC OXYGEN, UP TO 24 HOURS

## 2023-01-01 PROCEDURE — 94799 UNLISTED PULMONARY SVC/PX: CPT

## 2023-01-01 PROCEDURE — 97530 THERAPEUTIC ACTIVITIES: CPT

## 2023-01-01 PROCEDURE — 94640 AIRWAY INHALATION TREATMENT: CPT

## 2023-01-01 PROCEDURE — 36415 COLL VENOUS BLD VENIPUNCTURE: CPT | Performed by: INTERNAL MEDICINE

## 2023-01-01 PROCEDURE — 94761 N-INVAS EAR/PLS OXIMETRY MLT: CPT

## 2023-01-01 PROCEDURE — 63600175 PHARM REV CODE 636 W HCPCS: Performed by: STUDENT IN AN ORGANIZED HEALTH CARE EDUCATION/TRAINING PROGRAM

## 2023-01-01 PROCEDURE — 25000003 PHARM REV CODE 250: Performed by: INTERNAL MEDICINE

## 2023-01-01 PROCEDURE — 97116 GAIT TRAINING THERAPY: CPT

## 2023-01-01 PROCEDURE — 83735 ASSAY OF MAGNESIUM: CPT | Performed by: STUDENT IN AN ORGANIZED HEALTH CARE EDUCATION/TRAINING PROGRAM

## 2023-01-01 PROCEDURE — 80053 COMPREHEN METABOLIC PANEL: CPT | Performed by: FAMILY MEDICINE

## 2023-01-01 PROCEDURE — 93010 EKG 12-LEAD: ICD-10-PCS | Mod: ,,, | Performed by: INTERNAL MEDICINE

## 2023-01-01 PROCEDURE — 99900031 HC PATIENT EDUCATION (STAT)

## 2023-01-01 PROCEDURE — 25000003 PHARM REV CODE 250: Performed by: STUDENT IN AN ORGANIZED HEALTH CARE EDUCATION/TRAINING PROGRAM

## 2023-01-01 PROCEDURE — 25000003 PHARM REV CODE 250: Performed by: FAMILY MEDICINE

## 2023-01-01 PROCEDURE — 25000242 PHARM REV CODE 250 ALT 637 W/ HCPCS: Performed by: STUDENT IN AN ORGANIZED HEALTH CARE EDUCATION/TRAINING PROGRAM

## 2023-01-01 PROCEDURE — 99900035 HC TECH TIME PER 15 MIN (STAT)

## 2023-01-01 PROCEDURE — 63600175 PHARM REV CODE 636 W HCPCS: Performed by: HOSPITALIST

## 2023-01-01 PROCEDURE — 93005 ELECTROCARDIOGRAM TRACING: CPT | Mod: 59

## 2023-01-01 PROCEDURE — 83605 ASSAY OF LACTIC ACID: CPT | Performed by: INTERNAL MEDICINE

## 2023-01-01 PROCEDURE — 94640 AIRWAY INHALATION TREATMENT: CPT | Mod: XB

## 2023-01-01 PROCEDURE — 99223 PR INITIAL HOSPITAL CARE,LEVL III: ICD-10-PCS | Mod: 95,,, | Performed by: INTERNAL MEDICINE

## 2023-01-01 PROCEDURE — 99233 SBSQ HOSP IP/OBS HIGH 50: CPT | Mod: ,,, | Performed by: FAMILY MEDICINE

## 2023-01-01 PROCEDURE — P9016 RBC LEUKOCYTES REDUCED: HCPCS | Performed by: INTERNAL MEDICINE

## 2023-01-01 PROCEDURE — 99233 SBSQ HOSP IP/OBS HIGH 50: CPT | Mod: ,,, | Performed by: STUDENT IN AN ORGANIZED HEALTH CARE EDUCATION/TRAINING PROGRAM

## 2023-01-01 PROCEDURE — 36415 COLL VENOUS BLD VENIPUNCTURE: CPT | Performed by: EMERGENCY MEDICINE

## 2023-01-01 PROCEDURE — 97535 SELF CARE MNGMENT TRAINING: CPT

## 2023-01-01 PROCEDURE — 87502 INFLUENZA DNA AMP PROBE: CPT | Performed by: EMERGENCY MEDICINE

## 2023-01-01 PROCEDURE — 96372 THER/PROPH/DIAG INJ SC/IM: CPT | Performed by: FAMILY MEDICINE

## 2023-01-01 PROCEDURE — 97110 THERAPEUTIC EXERCISES: CPT

## 2023-01-01 PROCEDURE — 84484 ASSAY OF TROPONIN QUANT: CPT | Performed by: STUDENT IN AN ORGANIZED HEALTH CARE EDUCATION/TRAINING PROGRAM

## 2023-01-01 PROCEDURE — 99223 1ST HOSP IP/OBS HIGH 75: CPT | Mod: 95,,, | Performed by: INTERNAL MEDICINE

## 2023-01-01 PROCEDURE — 86920 COMPATIBILITY TEST SPIN: CPT | Performed by: EMERGENCY MEDICINE

## 2023-01-01 PROCEDURE — 84484 ASSAY OF TROPONIN QUANT: CPT | Performed by: EMERGENCY MEDICINE

## 2023-01-01 PROCEDURE — 63600175 PHARM REV CODE 636 W HCPCS: Performed by: INTERNAL MEDICINE

## 2023-01-01 PROCEDURE — C9113 INJ PANTOPRAZOLE SODIUM, VIA: HCPCS | Performed by: INTERNAL MEDICINE

## 2023-01-01 PROCEDURE — 84145 PROCALCITONIN (PCT): CPT | Performed by: INTERNAL MEDICINE

## 2023-01-01 PROCEDURE — 71045 XR CHEST AP PORTABLE: ICD-10-PCS | Mod: 26,,, | Performed by: RADIOLOGY

## 2023-01-01 PROCEDURE — 27000207 HC ISOLATION

## 2023-01-01 PROCEDURE — 27000249 HC VAPOTHERM CIRCUIT

## 2023-01-01 PROCEDURE — 96376 TX/PRO/DX INJ SAME DRUG ADON: CPT

## 2023-01-01 PROCEDURE — 85007 BL SMEAR W/DIFF WBC COUNT: CPT | Performed by: EMERGENCY MEDICINE

## 2023-01-01 PROCEDURE — 83605 ASSAY OF LACTIC ACID: CPT | Performed by: STUDENT IN AN ORGANIZED HEALTH CARE EDUCATION/TRAINING PROGRAM

## 2023-01-01 PROCEDURE — 36430 TRANSFUSION BLD/BLD COMPNT: CPT | Mod: 59

## 2023-01-01 PROCEDURE — 11000001 HC ACUTE MED/SURG PRIVATE ROOM

## 2023-01-01 PROCEDURE — 96375 TX/PRO/DX INJ NEW DRUG ADDON: CPT

## 2023-01-01 PROCEDURE — 87070 CULTURE OTHR SPECIMN AEROBIC: CPT | Performed by: HOSPITALIST

## 2023-01-01 PROCEDURE — 99233 PR SUBSEQUENT HOSPITAL CARE,LEVL III: ICD-10-PCS | Mod: ,,, | Performed by: FAMILY MEDICINE

## 2023-01-01 PROCEDURE — 82272 OCCULT BLD FECES 1-3 TESTS: CPT | Performed by: EMERGENCY MEDICINE

## 2023-01-01 PROCEDURE — 87186 SC STD MICRODIL/AGAR DIL: CPT | Performed by: FAMILY MEDICINE

## 2023-01-01 PROCEDURE — 80053 COMPREHEN METABOLIC PANEL: CPT | Performed by: EMERGENCY MEDICINE

## 2023-01-01 PROCEDURE — 83880 ASSAY OF NATRIURETIC PEPTIDE: CPT | Performed by: EMERGENCY MEDICINE

## 2023-01-01 PROCEDURE — 36415 COLL VENOUS BLD VENIPUNCTURE: CPT | Performed by: HOSPITALIST

## 2023-01-01 PROCEDURE — P9016 RBC LEUKOCYTES REDUCED: HCPCS | Performed by: EMERGENCY MEDICINE

## 2023-01-01 PROCEDURE — 85025 COMPLETE CBC W/AUTO DIFF WBC: CPT | Performed by: EMERGENCY MEDICINE

## 2023-01-01 PROCEDURE — 81003 URINALYSIS AUTO W/O SCOPE: CPT | Performed by: INTERNAL MEDICINE

## 2023-01-01 PROCEDURE — 80048 BASIC METABOLIC PNL TOTAL CA: CPT | Performed by: INTERNAL MEDICINE

## 2023-01-01 PROCEDURE — 85025 COMPLETE CBC W/AUTO DIFF WBC: CPT | Performed by: STUDENT IN AN ORGANIZED HEALTH CARE EDUCATION/TRAINING PROGRAM

## 2023-01-01 PROCEDURE — 25000242 PHARM REV CODE 250 ALT 637 W/ HCPCS: Performed by: INTERNAL MEDICINE

## 2023-01-01 PROCEDURE — 96372 THER/PROPH/DIAG INJ SC/IM: CPT | Performed by: INTERNAL MEDICINE

## 2023-01-01 PROCEDURE — 71045 X-RAY EXAM CHEST 1 VIEW: CPT | Mod: 26,,, | Performed by: RADIOLOGY

## 2023-01-01 PROCEDURE — 87641 MR-STAPH DNA AMP PROBE: CPT | Performed by: STUDENT IN AN ORGANIZED HEALTH CARE EDUCATION/TRAINING PROGRAM

## 2023-01-01 PROCEDURE — 97162 PT EVAL MOD COMPLEX 30 MIN: CPT

## 2023-01-01 PROCEDURE — 86850 RBC ANTIBODY SCREEN: CPT | Performed by: EMERGENCY MEDICINE

## 2023-01-01 PROCEDURE — 99233 PR SUBSEQUENT HOSPITAL CARE,LEVL III: ICD-10-PCS | Mod: ,,, | Performed by: STUDENT IN AN ORGANIZED HEALTH CARE EDUCATION/TRAINING PROGRAM

## 2023-01-01 PROCEDURE — 96372 THER/PROPH/DIAG INJ SC/IM: CPT | Performed by: STUDENT IN AN ORGANIZED HEALTH CARE EDUCATION/TRAINING PROGRAM

## 2023-01-01 PROCEDURE — 36600 WITHDRAWAL OF ARTERIAL BLOOD: CPT

## 2023-01-01 PROCEDURE — 81000 URINALYSIS NONAUTO W/SCOPE: CPT | Mod: 59 | Performed by: FAMILY MEDICINE

## 2023-01-01 PROCEDURE — 93010 ELECTROCARDIOGRAM REPORT: CPT | Mod: ,,, | Performed by: INTERNAL MEDICINE

## 2023-01-01 PROCEDURE — 96374 THER/PROPH/DIAG INJ IV PUSH: CPT | Mod: 59

## 2023-01-01 PROCEDURE — 25000003 PHARM REV CODE 250: Performed by: HOSPITALIST

## 2023-01-01 PROCEDURE — 99223 1ST HOSP IP/OBS HIGH 75: CPT | Mod: ,,, | Performed by: PODIATRIST

## 2023-01-01 PROCEDURE — 82803 BLOOD GASES ANY COMBINATION: CPT

## 2023-01-01 PROCEDURE — U0002 COVID-19 LAB TEST NON-CDC: HCPCS | Performed by: EMERGENCY MEDICINE

## 2023-01-01 PROCEDURE — 93005 ELECTROCARDIOGRAM TRACING: CPT

## 2023-01-01 PROCEDURE — 63600175 PHARM REV CODE 636 W HCPCS: Performed by: PHYSICIAN ASSISTANT

## 2023-01-01 PROCEDURE — 85025 COMPLETE CBC W/AUTO DIFF WBC: CPT | Performed by: INTERNAL MEDICINE

## 2023-01-01 PROCEDURE — 87086 URINE CULTURE/COLONY COUNT: CPT | Performed by: FAMILY MEDICINE

## 2023-01-01 PROCEDURE — 84100 ASSAY OF PHOSPHORUS: CPT | Performed by: FAMILY MEDICINE

## 2023-01-01 PROCEDURE — 85018 HEMOGLOBIN: CPT | Performed by: FAMILY MEDICINE

## 2023-01-01 PROCEDURE — G0425 PR INPT TELEHEALTH CONSULT 30M: ICD-10-PCS | Mod: GT,,, | Performed by: STUDENT IN AN ORGANIZED HEALTH CARE EDUCATION/TRAINING PROGRAM

## 2023-01-01 PROCEDURE — 83605 ASSAY OF LACTIC ACID: CPT | Mod: 91 | Performed by: PHYSICIAN ASSISTANT

## 2023-01-01 PROCEDURE — 20000000 HC ICU ROOM

## 2023-01-01 PROCEDURE — 84466 ASSAY OF TRANSFERRIN: CPT | Performed by: INTERNAL MEDICINE

## 2023-01-01 PROCEDURE — 97166 OT EVAL MOD COMPLEX 45 MIN: CPT

## 2023-01-01 PROCEDURE — 71045 X-RAY EXAM CHEST 1 VIEW: CPT | Mod: TC

## 2023-01-01 PROCEDURE — 25000003 PHARM REV CODE 250: Performed by: EMERGENCY MEDICINE

## 2023-01-01 PROCEDURE — 83735 ASSAY OF MAGNESIUM: CPT | Performed by: EMERGENCY MEDICINE

## 2023-01-01 PROCEDURE — 27100092 HC HIGH FLOW DELIVERY CANNULA

## 2023-01-01 PROCEDURE — G0378 HOSPITAL OBSERVATION PER HR: HCPCS

## 2023-01-01 PROCEDURE — 71045 XR CHEST 1 VIEW: ICD-10-PCS | Mod: 26,,, | Performed by: RADIOLOGY

## 2023-01-01 PROCEDURE — 94760 N-INVAS EAR/PLS OXIMETRY 1: CPT

## 2023-01-01 PROCEDURE — 96365 THER/PROPH/DIAG IV INF INIT: CPT

## 2023-01-01 PROCEDURE — 84100 ASSAY OF PHOSPHORUS: CPT | Performed by: INTERNAL MEDICINE

## 2023-01-01 PROCEDURE — 83735 ASSAY OF MAGNESIUM: CPT | Performed by: FAMILY MEDICINE

## 2023-01-01 PROCEDURE — 84484 ASSAY OF TROPONIN QUANT: CPT | Mod: 91 | Performed by: EMERGENCY MEDICINE

## 2023-01-01 PROCEDURE — 84484 ASSAY OF TROPONIN QUANT: CPT | Performed by: FAMILY MEDICINE

## 2023-01-01 PROCEDURE — 94664 DEMO&/EVAL PT USE INHALER: CPT | Mod: XB

## 2023-01-01 PROCEDURE — 83036 HEMOGLOBIN GLYCOSYLATED A1C: CPT | Performed by: INTERNAL MEDICINE

## 2023-01-01 PROCEDURE — 83880 ASSAY OF NATRIURETIC PEPTIDE: CPT | Performed by: HOSPITALIST

## 2023-01-01 PROCEDURE — 85025 COMPLETE CBC W/AUTO DIFF WBC: CPT | Performed by: FAMILY MEDICINE

## 2023-01-01 PROCEDURE — 99223 1ST HOSP IP/OBS HIGH 75: CPT | Mod: ,,, | Performed by: STUDENT IN AN ORGANIZED HEALTH CARE EDUCATION/TRAINING PROGRAM

## 2023-01-01 PROCEDURE — 80307 DRUG TEST PRSMV CHEM ANLYZR: CPT | Performed by: FAMILY MEDICINE

## 2023-01-01 PROCEDURE — 86920 COMPATIBILITY TEST SPIN: CPT | Performed by: INTERNAL MEDICINE

## 2023-01-01 PROCEDURE — 80053 COMPREHEN METABOLIC PANEL: CPT | Performed by: INTERNAL MEDICINE

## 2023-01-01 PROCEDURE — 87040 BLOOD CULTURE FOR BACTERIA: CPT | Mod: 59 | Performed by: INTERNAL MEDICINE

## 2023-01-01 PROCEDURE — 83605 ASSAY OF LACTIC ACID: CPT | Performed by: EMERGENCY MEDICINE

## 2023-01-01 PROCEDURE — 63600175 PHARM REV CODE 636 W HCPCS: Performed by: EMERGENCY MEDICINE

## 2023-01-01 PROCEDURE — 36556 INSERT NON-TUNNEL CV CATH: CPT

## 2023-01-01 PROCEDURE — 25000242 PHARM REV CODE 250 ALT 637 W/ HCPCS: Performed by: FAMILY MEDICINE

## 2023-01-01 PROCEDURE — 63600175 PHARM REV CODE 636 W HCPCS: Performed by: FAMILY MEDICINE

## 2023-01-01 PROCEDURE — 80053 COMPREHEN METABOLIC PANEL: CPT | Performed by: STUDENT IN AN ORGANIZED HEALTH CARE EDUCATION/TRAINING PROGRAM

## 2023-01-01 PROCEDURE — 86900 BLOOD TYPING SEROLOGIC ABO: CPT | Performed by: INTERNAL MEDICINE

## 2023-01-01 PROCEDURE — 27000646 HC AEROBIKA DEVICE

## 2023-01-01 PROCEDURE — 84484 ASSAY OF TROPONIN QUANT: CPT | Performed by: INTERNAL MEDICINE

## 2023-01-01 PROCEDURE — 87088 URINE BACTERIA CULTURE: CPT | Performed by: FAMILY MEDICINE

## 2023-01-01 PROCEDURE — 27100171 HC OXYGEN HIGH FLOW UP TO 24 HOURS

## 2023-01-01 PROCEDURE — 99285 EMERGENCY DEPT VISIT HI MDM: CPT | Mod: 25

## 2023-01-01 PROCEDURE — 99233 PR SUBSEQUENT HOSPITAL CARE,LEVL III: ICD-10-PCS | Mod: ,,, | Performed by: HOSPITALIST

## 2023-01-01 PROCEDURE — 87635 SARS-COV-2 COVID-19 AMP PRB: CPT | Performed by: INTERNAL MEDICINE

## 2023-01-01 PROCEDURE — 25000242 PHARM REV CODE 250 ALT 637 W/ HCPCS: Performed by: EMERGENCY MEDICINE

## 2023-01-01 PROCEDURE — 84439 ASSAY OF FREE THYROXINE: CPT | Performed by: INTERNAL MEDICINE

## 2023-01-01 PROCEDURE — 87205 SMEAR GRAM STAIN: CPT | Performed by: HOSPITALIST

## 2023-01-01 PROCEDURE — 82746 ASSAY OF FOLIC ACID SERUM: CPT | Performed by: INTERNAL MEDICINE

## 2023-01-01 PROCEDURE — 99223 PR INITIAL HOSPITAL CARE,LEVL III: ICD-10-PCS | Mod: ,,, | Performed by: PODIATRIST

## 2023-01-01 PROCEDURE — 87077 CULTURE AEROBIC IDENTIFY: CPT | Performed by: FAMILY MEDICINE

## 2023-01-01 PROCEDURE — 99232 SBSQ HOSP IP/OBS MODERATE 35: CPT | Mod: ,,, | Performed by: HOSPITALIST

## 2023-01-01 PROCEDURE — U0002 COVID-19 LAB TEST NON-CDC: HCPCS | Performed by: PHYSICIAN ASSISTANT

## 2023-01-01 PROCEDURE — G0425 INPT/ED TELECONSULT30: HCPCS | Mod: GT,,, | Performed by: STUDENT IN AN ORGANIZED HEALTH CARE EDUCATION/TRAINING PROGRAM

## 2023-01-01 PROCEDURE — 83880 ASSAY OF NATRIURETIC PEPTIDE: CPT | Performed by: FAMILY MEDICINE

## 2023-01-01 PROCEDURE — 25000242 PHARM REV CODE 250 ALT 637 W/ HCPCS

## 2023-01-01 PROCEDURE — 99233 SBSQ HOSP IP/OBS HIGH 50: CPT | Mod: ,,, | Performed by: HOSPITALIST

## 2023-01-01 PROCEDURE — 85027 COMPLETE CBC AUTOMATED: CPT | Performed by: INTERNAL MEDICINE

## 2023-01-01 PROCEDURE — 85610 PROTHROMBIN TIME: CPT | Performed by: EMERGENCY MEDICINE

## 2023-01-01 PROCEDURE — 82550 ASSAY OF CK (CPK): CPT | Performed by: FAMILY MEDICINE

## 2023-01-01 PROCEDURE — 99223 PR INITIAL HOSPITAL CARE,LEVL III: ICD-10-PCS | Mod: ,,, | Performed by: STUDENT IN AN ORGANIZED HEALTH CARE EDUCATION/TRAINING PROGRAM

## 2023-01-01 PROCEDURE — 82607 VITAMIN B-12: CPT | Performed by: INTERNAL MEDICINE

## 2023-01-01 PROCEDURE — 83735 ASSAY OF MAGNESIUM: CPT | Performed by: INTERNAL MEDICINE

## 2023-01-01 PROCEDURE — 82728 ASSAY OF FERRITIN: CPT | Performed by: INTERNAL MEDICINE

## 2023-01-01 PROCEDURE — 84443 ASSAY THYROID STIM HORMONE: CPT | Performed by: INTERNAL MEDICINE

## 2023-01-01 PROCEDURE — C9113 INJ PANTOPRAZOLE SODIUM, VIA: HCPCS | Performed by: EMERGENCY MEDICINE

## 2023-01-01 PROCEDURE — 82962 GLUCOSE BLOOD TEST: CPT

## 2023-01-01 PROCEDURE — 86900 BLOOD TYPING SEROLOGIC ABO: CPT | Performed by: FAMILY MEDICINE

## 2023-01-01 PROCEDURE — 85027 COMPLETE CBC AUTOMATED: CPT | Performed by: EMERGENCY MEDICINE

## 2023-01-01 PROCEDURE — 87040 BLOOD CULTURE FOR BACTERIA: CPT | Performed by: PHYSICIAN ASSISTANT

## 2023-01-01 PROCEDURE — 99291 CRITICAL CARE FIRST HOUR: CPT

## 2023-01-01 PROCEDURE — 84480 ASSAY TRIIODOTHYRONINE (T3): CPT | Performed by: INTERNAL MEDICINE

## 2023-01-01 PROCEDURE — 96374 THER/PROPH/DIAG INJ IV PUSH: CPT

## 2023-01-01 PROCEDURE — 36415 COLL VENOUS BLD VENIPUNCTURE: CPT | Performed by: FAMILY MEDICINE

## 2023-01-01 PROCEDURE — 51702 INSERT TEMP BLADDER CATH: CPT | Mod: 59

## 2023-01-01 PROCEDURE — 81000 URINALYSIS NONAUTO W/SCOPE: CPT | Performed by: PHYSICIAN ASSISTANT

## 2023-01-01 PROCEDURE — 86803 HEPATITIS C AB TEST: CPT | Performed by: EMERGENCY MEDICINE

## 2023-01-01 PROCEDURE — 99232 PR SUBSEQUENT HOSPITAL CARE,LEVL II: ICD-10-PCS | Mod: ,,, | Performed by: HOSPITALIST

## 2023-01-01 PROCEDURE — 85018 HEMOGLOBIN: CPT | Performed by: INTERNAL MEDICINE

## 2023-01-01 PROCEDURE — 87389 HIV-1 AG W/HIV-1&-2 AB AG IA: CPT | Performed by: EMERGENCY MEDICINE

## 2023-01-01 PROCEDURE — 85014 HEMATOCRIT: CPT | Mod: 91 | Performed by: INTERNAL MEDICINE

## 2023-01-01 PROCEDURE — 36430 TRANSFUSION BLD/BLD COMPNT: CPT

## 2023-01-01 PROCEDURE — 87502 INFLUENZA DNA AMP PROBE: CPT | Performed by: PHYSICIAN ASSISTANT

## 2023-01-01 PROCEDURE — 85014 HEMATOCRIT: CPT | Performed by: FAMILY MEDICINE

## 2023-01-01 RX ORDER — ALBUTEROL SULFATE 0.83 MG/ML
2.5 SOLUTION RESPIRATORY (INHALATION) EVERY 8 HOURS PRN
Status: DISCONTINUED | OUTPATIENT
Start: 2023-01-01 | End: 2023-01-01

## 2023-01-01 RX ORDER — FUROSEMIDE 20 MG/1
60 TABLET ORAL 2 TIMES DAILY
Status: DISCONTINUED | OUTPATIENT
Start: 2023-01-01 | End: 2023-01-01 | Stop reason: HOSPADM

## 2023-01-01 RX ORDER — POTASSIUM CHLORIDE 20 MEQ/1
40 TABLET, EXTENDED RELEASE ORAL ONCE
Status: DISCONTINUED | OUTPATIENT
Start: 2023-01-01 | End: 2023-01-01

## 2023-01-01 RX ORDER — HALOPERIDOL 5 MG/ML
10 INJECTION INTRAMUSCULAR
Status: COMPLETED | OUTPATIENT
Start: 2023-01-01 | End: 2023-01-01

## 2023-01-01 RX ORDER — PANTOPRAZOLE SODIUM 40 MG/10ML
40 INJECTION, POWDER, LYOPHILIZED, FOR SOLUTION INTRAVENOUS 2 TIMES DAILY
Status: DISCONTINUED | OUTPATIENT
Start: 2023-01-01 | End: 2023-01-01

## 2023-01-01 RX ORDER — ALBUTEROL SULFATE 0.83 MG/ML
2.5 SOLUTION RESPIRATORY (INHALATION) EVERY 4 HOURS PRN
Status: DISCONTINUED | OUTPATIENT
Start: 2023-01-01 | End: 2023-01-01

## 2023-01-01 RX ORDER — MUPIROCIN 20 MG/G
OINTMENT TOPICAL 2 TIMES DAILY
Status: DISCONTINUED | OUTPATIENT
Start: 2023-01-01 | End: 2023-01-01 | Stop reason: HOSPADM

## 2023-01-01 RX ORDER — IPRATROPIUM BROMIDE AND ALBUTEROL SULFATE 2.5; .5 MG/3ML; MG/3ML
3 SOLUTION RESPIRATORY (INHALATION) EVERY 6 HOURS PRN
Status: DISCONTINUED | OUTPATIENT
Start: 2023-01-01 | End: 2023-01-01 | Stop reason: HOSPADM

## 2023-01-01 RX ORDER — IPRATROPIUM BROMIDE AND ALBUTEROL SULFATE 2.5; .5 MG/3ML; MG/3ML
3 SOLUTION RESPIRATORY (INHALATION) EVERY 4 HOURS PRN
Status: DISCONTINUED | OUTPATIENT
Start: 2023-01-01 | End: 2023-01-01 | Stop reason: HOSPADM

## 2023-01-01 RX ORDER — LORAZEPAM 2 MG/ML
2 INJECTION INTRAMUSCULAR
Status: COMPLETED | OUTPATIENT
Start: 2023-01-01 | End: 2023-01-01

## 2023-01-01 RX ORDER — HALOPERIDOL 5 MG/ML
10 INJECTION INTRAMUSCULAR
Status: DISCONTINUED | OUTPATIENT
Start: 2023-01-01 | End: 2023-01-01

## 2023-01-01 RX ORDER — FUROSEMIDE 40 MG/1
40 TABLET ORAL 2 TIMES DAILY
Qty: 60 TABLET | Refills: 2 | Status: ON HOLD | OUTPATIENT
Start: 2023-01-01 | End: 2023-01-01 | Stop reason: HOSPADM

## 2023-01-01 RX ORDER — IPRATROPIUM BROMIDE 0.5 MG/2.5ML
0.5 SOLUTION RESPIRATORY (INHALATION) EVERY 4 HOURS PRN
Status: DISCONTINUED | OUTPATIENT
Start: 2023-01-01 | End: 2023-01-01

## 2023-01-01 RX ORDER — HYDROCODONE BITARTRATE AND ACETAMINOPHEN 500; 5 MG/1; MG/1
TABLET ORAL
Status: DISCONTINUED | OUTPATIENT
Start: 2023-01-01 | End: 2023-01-01 | Stop reason: HOSPADM

## 2023-01-01 RX ORDER — GLUCAGON 1 MG
1 KIT INJECTION
Status: DISCONTINUED | OUTPATIENT
Start: 2023-01-01 | End: 2023-01-01 | Stop reason: HOSPADM

## 2023-01-01 RX ORDER — SODIUM,POTASSIUM PHOSPHATES 280-250MG
2 POWDER IN PACKET (EA) ORAL
Status: DISCONTINUED | OUTPATIENT
Start: 2023-01-01 | End: 2023-01-01 | Stop reason: HOSPADM

## 2023-01-01 RX ORDER — FUROSEMIDE 10 MG/ML
60 INJECTION INTRAMUSCULAR; INTRAVENOUS ONCE
Status: COMPLETED | OUTPATIENT
Start: 2023-01-01 | End: 2023-01-01

## 2023-01-01 RX ORDER — TRIPROLIDINE/PSEUDOEPHEDRINE 2.5MG-60MG
200 TABLET ORAL EVERY 6 HOURS PRN
Status: DISCONTINUED | OUTPATIENT
Start: 2023-01-01 | End: 2023-01-01 | Stop reason: HOSPADM

## 2023-01-01 RX ORDER — IPRATROPIUM BROMIDE AND ALBUTEROL SULFATE 2.5; .5 MG/3ML; MG/3ML
3 SOLUTION RESPIRATORY (INHALATION)
Status: COMPLETED | OUTPATIENT
Start: 2023-01-01 | End: 2023-01-01

## 2023-01-01 RX ORDER — POTASSIUM CHLORIDE 20 MEQ/1
40 TABLET, EXTENDED RELEASE ORAL DAILY
Status: DISCONTINUED | OUTPATIENT
Start: 2023-01-01 | End: 2023-01-01

## 2023-01-01 RX ORDER — RISPERIDONE 1 MG/1
2 TABLET ORAL NIGHTLY
Status: DISCONTINUED | OUTPATIENT
Start: 2023-01-01 | End: 2023-01-01 | Stop reason: HOSPADM

## 2023-01-01 RX ORDER — NALOXONE HCL 0.4 MG/ML
0.02 VIAL (ML) INJECTION
Status: DISCONTINUED | OUTPATIENT
Start: 2023-01-01 | End: 2023-01-01 | Stop reason: HOSPADM

## 2023-01-01 RX ORDER — BENZONATATE 100 MG/1
200 CAPSULE ORAL 3 TIMES DAILY
Status: DISCONTINUED | OUTPATIENT
Start: 2023-01-01 | End: 2023-01-01 | Stop reason: HOSPADM

## 2023-01-01 RX ORDER — POTASSIUM CHLORIDE 7.45 MG/ML
10 INJECTION INTRAVENOUS ONCE
Status: COMPLETED | OUTPATIENT
Start: 2023-01-01 | End: 2023-01-01

## 2023-01-01 RX ORDER — ACETAMINOPHEN 325 MG/1
650 TABLET ORAL EVERY 4 HOURS PRN
Status: DISCONTINUED | OUTPATIENT
Start: 2023-01-01 | End: 2023-01-01 | Stop reason: HOSPADM

## 2023-01-01 RX ORDER — ALBUTEROL SULFATE 0.83 MG/ML
SOLUTION RESPIRATORY (INHALATION)
Status: COMPLETED
Start: 2023-01-01 | End: 2023-01-01

## 2023-01-01 RX ORDER — IPRATROPIUM BROMIDE AND ALBUTEROL SULFATE 2.5; .5 MG/3ML; MG/3ML
3 SOLUTION RESPIRATORY (INHALATION) EVERY 4 HOURS PRN
Status: DISCONTINUED | OUTPATIENT
Start: 2023-01-01 | End: 2023-01-01

## 2023-01-01 RX ORDER — LANOLIN ALCOHOL/MO/W.PET/CERES
1000 CREAM (GRAM) TOPICAL DAILY
COMMUNITY
Start: 2023-01-01

## 2023-01-01 RX ORDER — ATORVASTATIN CALCIUM 10 MG/1
10 TABLET, FILM COATED ORAL NIGHTLY
Status: DISCONTINUED | OUTPATIENT
Start: 2023-01-01 | End: 2023-01-01 | Stop reason: HOSPADM

## 2023-01-01 RX ORDER — CARVEDILOL 3.12 MG/1
3.12 TABLET ORAL 2 TIMES DAILY WITH MEALS
Status: DISCONTINUED | OUTPATIENT
Start: 2023-01-01 | End: 2023-01-01 | Stop reason: HOSPADM

## 2023-01-01 RX ORDER — FUROSEMIDE 10 MG/ML
40 INJECTION INTRAMUSCULAR; INTRAVENOUS ONCE
Status: COMPLETED | OUTPATIENT
Start: 2023-01-01 | End: 2023-01-01

## 2023-01-01 RX ORDER — LEVOTHYROXINE SODIUM 25 UG/1
100 TABLET ORAL
Status: DISCONTINUED | OUTPATIENT
Start: 2023-01-01 | End: 2023-01-01 | Stop reason: HOSPADM

## 2023-01-01 RX ORDER — LORAZEPAM 2 MG/ML
1 INJECTION INTRAMUSCULAR
Status: COMPLETED | OUTPATIENT
Start: 2023-01-01 | End: 2023-01-01

## 2023-01-01 RX ORDER — ALBUTEROL SULFATE 90 UG/1
2 AEROSOL, METERED RESPIRATORY (INHALATION) EVERY 8 HOURS PRN
Status: DISCONTINUED | OUTPATIENT
Start: 2023-01-01 | End: 2023-01-01 | Stop reason: CLARIF

## 2023-01-01 RX ORDER — ALBUTEROL SULFATE 0.83 MG/ML
SOLUTION RESPIRATORY (INHALATION)
Status: DISCONTINUED
Start: 2023-01-01 | End: 2023-01-01 | Stop reason: WASHOUT

## 2023-01-01 RX ORDER — LANOLIN ALCOHOL/MO/W.PET/CERES
800 CREAM (GRAM) TOPICAL
Status: DISCONTINUED | OUTPATIENT
Start: 2023-01-01 | End: 2023-01-01 | Stop reason: HOSPADM

## 2023-01-01 RX ORDER — ROSUVASTATIN CALCIUM 20 MG/1
20 TABLET, COATED ORAL NIGHTLY
COMMUNITY
Start: 2023-01-01

## 2023-01-01 RX ORDER — LANOLIN ALCOHOL/MO/W.PET/CERES
1 CREAM (GRAM) TOPICAL DAILY
Status: DISCONTINUED | OUTPATIENT
Start: 2023-01-01 | End: 2023-01-01 | Stop reason: HOSPADM

## 2023-01-01 RX ORDER — ONDANSETRON 2 MG/ML
4 INJECTION INTRAMUSCULAR; INTRAVENOUS EVERY 8 HOURS PRN
Status: DISCONTINUED | OUTPATIENT
Start: 2023-01-01 | End: 2023-01-01 | Stop reason: HOSPADM

## 2023-01-01 RX ORDER — FUROSEMIDE 40 MG/1
40 TABLET ORAL 2 TIMES DAILY
Status: DISCONTINUED | OUTPATIENT
Start: 2023-01-01 | End: 2023-01-01

## 2023-01-01 RX ORDER — POTASSIUM CHLORIDE 20 MEQ/1
40 TABLET, EXTENDED RELEASE ORAL ONCE
Status: COMPLETED | OUTPATIENT
Start: 2023-01-01 | End: 2023-01-01

## 2023-01-01 RX ORDER — LEVOTHYROXINE SODIUM 100 UG/1
100 TABLET ORAL DAILY
Status: DISCONTINUED | OUTPATIENT
Start: 2023-01-01 | End: 2023-01-01 | Stop reason: HOSPADM

## 2023-01-01 RX ORDER — AMOXICILLIN AND CLAVULANATE POTASSIUM 875; 125 MG/1; MG/1
1 TABLET, FILM COATED ORAL EVERY 12 HOURS
Status: DISCONTINUED | OUTPATIENT
Start: 2023-01-01 | End: 2023-01-01 | Stop reason: HOSPADM

## 2023-01-01 RX ORDER — ERTAPENEM 1 G/1
1 INJECTION, POWDER, LYOPHILIZED, FOR SOLUTION INTRAMUSCULAR; INTRAVENOUS
Status: DISCONTINUED | OUTPATIENT
Start: 2023-01-01 | End: 2023-01-01 | Stop reason: HOSPADM

## 2023-01-01 RX ORDER — PREDNISONE 10 MG/1
20 TABLET ORAL 2 TIMES DAILY
Status: ON HOLD | COMMUNITY
Start: 2023-01-01 | End: 2023-01-01

## 2023-01-01 RX ORDER — MONTELUKAST SODIUM 10 MG/1
10 TABLET ORAL DAILY
Status: ON HOLD | COMMUNITY
Start: 2023-01-01 | End: 2023-01-01

## 2023-01-01 RX ORDER — FUROSEMIDE 10 MG/ML
60 INJECTION INTRAMUSCULAR; INTRAVENOUS
Status: DISCONTINUED | OUTPATIENT
Start: 2023-01-01 | End: 2023-01-01

## 2023-01-01 RX ORDER — IPRATROPIUM BROMIDE AND ALBUTEROL SULFATE 2.5; .5 MG/3ML; MG/3ML
3 SOLUTION RESPIRATORY (INHALATION) EVERY 6 HOURS PRN
Qty: 75 ML | Refills: 3 | Status: SHIPPED | OUTPATIENT
Start: 2023-01-01 | End: 2024-11-07

## 2023-01-01 RX ORDER — HYDROCODONE BITARTRATE AND ACETAMINOPHEN 5; 325 MG/1; MG/1
1 TABLET ORAL EVERY 6 HOURS PRN
Status: DISCONTINUED | OUTPATIENT
Start: 2023-01-01 | End: 2023-01-01 | Stop reason: HOSPADM

## 2023-01-01 RX ORDER — IBUPROFEN 200 MG
16 TABLET ORAL
Status: DISCONTINUED | OUTPATIENT
Start: 2023-01-01 | End: 2023-01-01 | Stop reason: HOSPADM

## 2023-01-01 RX ORDER — LORAZEPAM 2 MG/ML
2 INJECTION INTRAMUSCULAR EVERY 6 HOURS PRN
Status: DISCONTINUED | OUTPATIENT
Start: 2023-01-01 | End: 2023-01-01 | Stop reason: HOSPADM

## 2023-01-01 RX ORDER — FUROSEMIDE 10 MG/ML
20 INJECTION INTRAMUSCULAR; INTRAVENOUS
Status: DISCONTINUED | OUTPATIENT
Start: 2023-01-01 | End: 2023-01-01

## 2023-01-01 RX ORDER — CARVEDILOL 3.12 MG/1
3.12 TABLET ORAL 2 TIMES DAILY
Status: DISCONTINUED | OUTPATIENT
Start: 2023-01-01 | End: 2023-01-01 | Stop reason: HOSPADM

## 2023-01-01 RX ORDER — FUROSEMIDE 20 MG/1
40 TABLET ORAL DAILY
Status: DISCONTINUED | OUTPATIENT
Start: 2023-01-01 | End: 2023-01-01 | Stop reason: HOSPADM

## 2023-01-01 RX ORDER — IPRATROPIUM BROMIDE AND ALBUTEROL SULFATE 2.5; .5 MG/3ML; MG/3ML
SOLUTION RESPIRATORY (INHALATION)
Status: DISPENSED
Start: 2023-01-01 | End: 2023-01-01

## 2023-01-01 RX ORDER — ERTAPENEM 1 G/1
1 INJECTION, POWDER, LYOPHILIZED, FOR SOLUTION INTRAMUSCULAR; INTRAVENOUS
Status: DISCONTINUED | OUTPATIENT
Start: 2023-01-01 | End: 2023-01-01

## 2023-01-01 RX ORDER — ARIPIPRAZOLE 5 MG/1
5 TABLET ORAL NIGHTLY
COMMUNITY
Start: 2023-01-01

## 2023-01-01 RX ORDER — ENOXAPARIN SODIUM 100 MG/ML
40 INJECTION SUBCUTANEOUS EVERY 24 HOURS
Status: DISCONTINUED | OUTPATIENT
Start: 2023-01-01 | End: 2023-01-01 | Stop reason: HOSPADM

## 2023-01-01 RX ORDER — CARVEDILOL 3.12 MG/1
3.12 TABLET ORAL 2 TIMES DAILY WITH MEALS
Qty: 60 TABLET | Refills: 11 | Status: ON HOLD | OUTPATIENT
Start: 2023-01-01 | End: 2023-01-01 | Stop reason: HOSPADM

## 2023-01-01 RX ORDER — ERGOCALCIFEROL 1.25 MG/1
50000 CAPSULE ORAL
COMMUNITY
Start: 2022-12-13

## 2023-01-01 RX ORDER — IBUPROFEN 100 MG/5ML
1000 SUSPENSION, ORAL (FINAL DOSE FORM) ORAL DAILY
COMMUNITY

## 2023-01-01 RX ORDER — ATORVASTATIN CALCIUM 10 MG/1
10 TABLET, FILM COATED ORAL DAILY
Status: DISCONTINUED | OUTPATIENT
Start: 2023-01-01 | End: 2023-01-01

## 2023-01-01 RX ORDER — SPIRONOLACTONE 25 MG/1
25 TABLET ORAL DAILY
Status: DISCONTINUED | OUTPATIENT
Start: 2023-01-01 | End: 2023-01-01 | Stop reason: HOSPADM

## 2023-01-01 RX ORDER — FUROSEMIDE 40 MG/1
40 TABLET ORAL 2 TIMES DAILY
Status: DISCONTINUED | OUTPATIENT
Start: 2023-01-01 | End: 2023-01-01 | Stop reason: HOSPADM

## 2023-01-01 RX ORDER — SODIUM CHLORIDE 0.9 % (FLUSH) 0.9 %
10 SYRINGE (ML) INJECTION
Status: DISCONTINUED | OUTPATIENT
Start: 2023-01-01 | End: 2023-01-01 | Stop reason: HOSPADM

## 2023-01-01 RX ORDER — IBUPROFEN 400 MG/1
400 TABLET ORAL EVERY 6 HOURS PRN
Status: DISCONTINUED | OUTPATIENT
Start: 2023-01-01 | End: 2023-01-01 | Stop reason: HOSPADM

## 2023-01-01 RX ORDER — AMOXICILLIN AND CLAVULANATE POTASSIUM 875; 125 MG/1; MG/1
1 TABLET, FILM COATED ORAL 2 TIMES DAILY
Qty: 2 TABLET | Refills: 0 | Status: SHIPPED | OUTPATIENT
Start: 2023-01-01 | End: 2023-01-01

## 2023-01-01 RX ORDER — IPRATROPIUM BROMIDE AND ALBUTEROL SULFATE 2.5; .5 MG/3ML; MG/3ML
3 SOLUTION RESPIRATORY (INHALATION) EVERY 6 HOURS PRN
Status: DISCONTINUED | OUTPATIENT
Start: 2023-01-01 | End: 2023-01-01

## 2023-01-01 RX ORDER — ASPIRIN 325 MG
325 TABLET ORAL ONCE
Status: COMPLETED | OUTPATIENT
Start: 2023-01-01 | End: 2023-01-01

## 2023-01-01 RX ORDER — FUROSEMIDE 10 MG/ML
40 INJECTION INTRAMUSCULAR; INTRAVENOUS
Status: COMPLETED | OUTPATIENT
Start: 2023-01-01 | End: 2023-01-01

## 2023-01-01 RX ORDER — IPRATROPIUM BROMIDE AND ALBUTEROL SULFATE 2.5; .5 MG/3ML; MG/3ML
3 SOLUTION RESPIRATORY (INHALATION) EVERY 6 HOURS
Status: DISCONTINUED | OUTPATIENT
Start: 2023-01-01 | End: 2023-01-01

## 2023-01-01 RX ORDER — DIPHENHYDRAMINE HYDROCHLORIDE 50 MG/ML
50 INJECTION INTRAMUSCULAR; INTRAVENOUS
Status: COMPLETED | OUTPATIENT
Start: 2023-01-01 | End: 2023-01-01

## 2023-01-01 RX ORDER — ALBUTEROL SULFATE 0.83 MG/ML
2.5 SOLUTION RESPIRATORY (INHALATION) EVERY 4 HOURS PRN
Status: DISCONTINUED | OUTPATIENT
Start: 2023-01-01 | End: 2023-01-01 | Stop reason: HOSPADM

## 2023-01-01 RX ORDER — ENOXAPARIN SODIUM 100 MG/ML
40 INJECTION SUBCUTANEOUS EVERY 12 HOURS
Status: DISCONTINUED | OUTPATIENT
Start: 2023-01-01 | End: 2023-01-01 | Stop reason: HOSPADM

## 2023-01-01 RX ORDER — FUROSEMIDE 10 MG/ML
80 INJECTION INTRAMUSCULAR; INTRAVENOUS
Status: COMPLETED | OUTPATIENT
Start: 2023-01-01 | End: 2023-01-01

## 2023-01-01 RX ORDER — FERROUS SULFATE 300 MG/5ML
300 LIQUID (ML) ORAL DAILY
Status: DISCONTINUED | OUTPATIENT
Start: 2023-01-01 | End: 2023-01-01 | Stop reason: HOSPADM

## 2023-01-01 RX ORDER — POLYETHYLENE GLYCOL 3350 17 G/17G
17 POWDER, FOR SOLUTION ORAL DAILY
Status: DISCONTINUED | OUTPATIENT
Start: 2023-01-01 | End: 2023-01-01 | Stop reason: HOSPADM

## 2023-01-01 RX ORDER — ALBUTEROL SULFATE 0.83 MG/ML
2.5 SOLUTION RESPIRATORY (INHALATION)
Status: COMPLETED | OUTPATIENT
Start: 2023-01-01 | End: 2023-01-01

## 2023-01-01 RX ORDER — SODIUM CHLORIDE 0.9 % (FLUSH) 0.9 %
10 SYRINGE (ML) INJECTION EVERY 12 HOURS PRN
Status: DISCONTINUED | OUTPATIENT
Start: 2023-01-01 | End: 2023-01-01 | Stop reason: HOSPADM

## 2023-01-01 RX ORDER — AMOXICILLIN 250 MG
1 CAPSULE ORAL DAILY PRN
Status: DISCONTINUED | OUTPATIENT
Start: 2023-01-01 | End: 2023-01-01 | Stop reason: HOSPADM

## 2023-01-01 RX ORDER — IBUPROFEN 200 MG
200 TABLET ORAL EVERY 6 HOURS PRN
Status: DISCONTINUED | OUTPATIENT
Start: 2023-01-01 | End: 2023-01-01 | Stop reason: CLARIF

## 2023-01-01 RX ORDER — FERROUS SULFATE 325(65) MG
325 TABLET, DELAYED RELEASE (ENTERIC COATED) ORAL DAILY
Qty: 30 TABLET | Refills: 3 | Status: SHIPPED | OUTPATIENT
Start: 2023-01-01

## 2023-01-01 RX ORDER — PANTOPRAZOLE SODIUM 40 MG/10ML
80 INJECTION, POWDER, LYOPHILIZED, FOR SOLUTION INTRAVENOUS
Status: COMPLETED | OUTPATIENT
Start: 2023-01-01 | End: 2023-01-01

## 2023-01-01 RX ORDER — ASPIRIN 81 MG/1
81 TABLET ORAL DAILY
Status: ON HOLD | COMMUNITY
Start: 2023-01-01 | End: 2023-01-01 | Stop reason: HOSPADM

## 2023-01-01 RX ORDER — ONDANSETRON 2 MG/ML
4 INJECTION INTRAMUSCULAR; INTRAVENOUS EVERY 6 HOURS PRN
Status: DISCONTINUED | OUTPATIENT
Start: 2023-01-01 | End: 2023-01-01 | Stop reason: HOSPADM

## 2023-01-01 RX ORDER — LISINOPRIL 10 MG/1
10 TABLET ORAL DAILY
Status: DISCONTINUED | OUTPATIENT
Start: 2023-01-01 | End: 2023-01-01 | Stop reason: HOSPADM

## 2023-01-01 RX ORDER — LORAZEPAM 1 MG/1
1 TABLET ORAL EVERY 6 HOURS PRN
Status: DISCONTINUED | OUTPATIENT
Start: 2023-01-01 | End: 2023-01-01 | Stop reason: HOSPADM

## 2023-01-01 RX ORDER — LIDOCAINE HYDROCHLORIDE 10 MG/ML
5 INJECTION, SOLUTION EPIDURAL; INFILTRATION; INTRACAUDAL; PERINEURAL ONCE
Status: COMPLETED | OUTPATIENT
Start: 2023-01-01 | End: 2023-01-01

## 2023-01-01 RX ORDER — POTASSIUM CHLORIDE 20 MEQ/1
20 TABLET, EXTENDED RELEASE ORAL ONCE
Status: COMPLETED | OUTPATIENT
Start: 2023-01-01 | End: 2023-01-01

## 2023-01-01 RX ORDER — MUPIROCIN 20 MG/G
OINTMENT TOPICAL 2 TIMES DAILY
Status: DISPENSED | OUTPATIENT
Start: 2023-01-01 | End: 2023-01-01

## 2023-01-01 RX ORDER — FUROSEMIDE 10 MG/ML
60 INJECTION INTRAMUSCULAR; INTRAVENOUS EVERY 8 HOURS
Status: DISCONTINUED | OUTPATIENT
Start: 2023-01-01 | End: 2023-01-01

## 2023-01-01 RX ORDER — ATORVASTATIN CALCIUM 40 MG/1
40 TABLET, FILM COATED ORAL NIGHTLY
Status: DISCONTINUED | OUTPATIENT
Start: 2023-01-01 | End: 2023-01-01 | Stop reason: HOSPADM

## 2023-01-01 RX ORDER — NAPROXEN SODIUM 220 MG/1
81 TABLET, FILM COATED ORAL NIGHTLY
Status: DISCONTINUED | OUTPATIENT
Start: 2023-01-01 | End: 2023-01-01 | Stop reason: HOSPADM

## 2023-01-01 RX ORDER — AMOXICILLIN 250 MG
1 CAPSULE ORAL 2 TIMES DAILY PRN
Status: DISCONTINUED | OUTPATIENT
Start: 2023-01-01 | End: 2023-01-01 | Stop reason: HOSPADM

## 2023-01-01 RX ORDER — LEVOTHYROXINE SODIUM 25 UG/1
100 TABLET ORAL DAILY
Status: DISCONTINUED | OUTPATIENT
Start: 2023-01-01 | End: 2023-01-01 | Stop reason: HOSPADM

## 2023-01-01 RX ORDER — IBUPROFEN 200 MG
24 TABLET ORAL
Status: DISCONTINUED | OUTPATIENT
Start: 2023-01-01 | End: 2023-01-01 | Stop reason: HOSPADM

## 2023-01-01 RX ORDER — AMOXICILLIN 250 MG
1 CAPSULE ORAL 2 TIMES DAILY
Status: DISCONTINUED | OUTPATIENT
Start: 2023-01-01 | End: 2023-01-01 | Stop reason: HOSPADM

## 2023-01-01 RX ORDER — LEVALBUTEROL INHALATION SOLUTION 1.25 MG/3ML
1.25 SOLUTION RESPIRATORY (INHALATION) EVERY 8 HOURS
Status: DISCONTINUED | OUTPATIENT
Start: 2023-01-01 | End: 2023-01-01

## 2023-01-01 RX ORDER — SPIRONOLACTONE 25 MG/1
25 TABLET ORAL DAILY
Qty: 30 TABLET | Refills: 11 | Status: ON HOLD | OUTPATIENT
Start: 2023-01-01 | End: 2023-01-01 | Stop reason: HOSPADM

## 2023-01-01 RX ADMIN — ENOXAPARIN SODIUM 40 MG: 40 INJECTION SUBCUTANEOUS at 09:06

## 2023-01-01 RX ADMIN — RISPERIDONE 2 MG: 1 TABLET ORAL at 08:06

## 2023-01-01 RX ADMIN — LEVOTHYROXINE SODIUM 100 MCG: 25 TABLET ORAL at 05:05

## 2023-01-01 RX ADMIN — FUROSEMIDE 60 MG: 10 INJECTION, SOLUTION INTRAMUSCULAR; INTRAVENOUS at 09:05

## 2023-01-01 RX ADMIN — ERTAPENEM 1 G: 1 INJECTION INTRAMUSCULAR; INTRAVENOUS at 03:06

## 2023-01-01 RX ADMIN — MUPIROCIN: 20 OINTMENT TOPICAL at 09:05

## 2023-01-01 RX ADMIN — CEFTRIAXONE SODIUM 1 G: 1 INJECTION, POWDER, FOR SOLUTION INTRAMUSCULAR; INTRAVENOUS at 10:05

## 2023-01-01 RX ADMIN — FUROSEMIDE 60 MG: 20 TABLET ORAL at 09:06

## 2023-01-01 RX ADMIN — LISINOPRIL 10 MG: 10 TABLET ORAL at 09:06

## 2023-01-01 RX ADMIN — FUROSEMIDE 60 MG: 10 INJECTION, SOLUTION INTRAMUSCULAR; INTRAVENOUS at 06:05

## 2023-01-01 RX ADMIN — DOCUSATE SODIUM 50MG AND SENNOSIDES 8.6MG 1 TABLET: 8.6; 5 TABLET, FILM COATED ORAL at 09:05

## 2023-01-01 RX ADMIN — ALBUTEROL SULFATE 2.5 MG: 0.83 SOLUTION RESPIRATORY (INHALATION) at 01:06

## 2023-01-01 RX ADMIN — LEVOTHYROXINE SODIUM 100 MCG: 0.1 TABLET ORAL at 05:11

## 2023-01-01 RX ADMIN — LEVOTHYROXINE SODIUM 100 MCG: 25 TABLET ORAL at 09:06

## 2023-01-01 RX ADMIN — RISPERIDONE 2 MG: 1 TABLET ORAL at 10:05

## 2023-01-01 RX ADMIN — FUROSEMIDE 60 MG: 10 INJECTION, SOLUTION INTRAMUSCULAR; INTRAVENOUS at 10:05

## 2023-01-01 RX ADMIN — IBUPROFEN 400 MG: 400 TABLET ORAL at 09:05

## 2023-01-01 RX ADMIN — FERROUS SULFATE TAB 325 MG (65 MG ELEMENTAL FE) 1 EACH: 325 (65 FE) TAB at 08:11

## 2023-01-01 RX ADMIN — RISPERIDONE 2 MG: 1 TABLET ORAL at 09:05

## 2023-01-01 RX ADMIN — IPRATROPIUM BROMIDE AND ALBUTEROL SULFATE 3 ML: 2.5; .5 SOLUTION RESPIRATORY (INHALATION) at 02:05

## 2023-01-01 RX ADMIN — IPRATROPIUM BROMIDE AND ALBUTEROL SULFATE 3 ML: 2.5; .5 SOLUTION RESPIRATORY (INHALATION) at 12:05

## 2023-01-01 RX ADMIN — MUPIROCIN 1 G: 20 OINTMENT TOPICAL at 08:11

## 2023-01-01 RX ADMIN — ENOXAPARIN SODIUM 40 MG: 40 INJECTION SUBCUTANEOUS at 08:06

## 2023-01-01 RX ADMIN — SPIRONOLACTONE 25 MG: 25 TABLET ORAL at 09:05

## 2023-01-01 RX ADMIN — POTASSIUM CHLORIDE EXTENDED-RELEASE 20 MEQ: 1500 TABLET ORAL at 11:11

## 2023-01-01 RX ADMIN — CARVEDILOL 3.12 MG: 3.12 TABLET, FILM COATED ORAL at 08:11

## 2023-01-01 RX ADMIN — POTASSIUM CHLORIDE 40 MEQ: 1500 TABLET, EXTENDED RELEASE ORAL at 08:05

## 2023-01-01 RX ADMIN — LEVOTHYROXINE SODIUM 100 MCG: 0.1 TABLET ORAL at 06:11

## 2023-01-01 RX ADMIN — ALBUTEROL SULFATE 2.5 MG: 2.5 SOLUTION RESPIRATORY (INHALATION) at 07:11

## 2023-01-01 RX ADMIN — IPRATROPIUM BROMIDE AND ALBUTEROL SULFATE 3 ML: .5; 3 SOLUTION RESPIRATORY (INHALATION) at 08:11

## 2023-01-01 RX ADMIN — RISPERIDONE 2 MG: 1 TABLET ORAL at 09:11

## 2023-01-01 RX ADMIN — LEVOTHYROXINE SODIUM 100 MCG: 25 TABLET ORAL at 08:06

## 2023-01-01 RX ADMIN — ALBUTEROL SULFATE 2.5 MG: 0.83 SOLUTION RESPIRATORY (INHALATION) at 05:05

## 2023-01-01 RX ADMIN — IPRATROPIUM BROMIDE AND ALBUTEROL SULFATE 3 ML: 2.5; .5 SOLUTION RESPIRATORY (INHALATION) at 07:05

## 2023-01-01 RX ADMIN — IPRATROPIUM BROMIDE AND ALBUTEROL SULFATE 3 ML: 2.5; .5 SOLUTION RESPIRATORY (INHALATION) at 07:06

## 2023-01-01 RX ADMIN — MUPIROCIN: 20 OINTMENT TOPICAL at 03:05

## 2023-01-01 RX ADMIN — ENOXAPARIN SODIUM 40 MG: 40 INJECTION SUBCUTANEOUS at 10:06

## 2023-01-01 RX ADMIN — SODIUM CHLORIDE 8 MG/HR: 900 INJECTION INTRAVENOUS at 02:11

## 2023-01-01 RX ADMIN — SPIRONOLACTONE 25 MG: 25 TABLET ORAL at 08:06

## 2023-01-01 RX ADMIN — LISINOPRIL 10 MG: 10 TABLET ORAL at 08:06

## 2023-01-01 RX ADMIN — IPRATROPIUM BROMIDE AND ALBUTEROL SULFATE 3 ML: 2.5; .5 SOLUTION RESPIRATORY (INHALATION) at 09:05

## 2023-01-01 RX ADMIN — ALBUTEROL SULFATE 2.5 MG: 2.5 SOLUTION RESPIRATORY (INHALATION) at 07:05

## 2023-01-01 RX ADMIN — FUROSEMIDE 60 MG: 20 TABLET ORAL at 06:06

## 2023-01-01 RX ADMIN — SPIRONOLACTONE 25 MG: 25 TABLET ORAL at 08:05

## 2023-01-01 RX ADMIN — IBUPROFEN 400 MG: 400 TABLET ORAL at 03:05

## 2023-01-01 RX ADMIN — ATORVASTATIN CALCIUM 40 MG: 40 TABLET, FILM COATED ORAL at 08:06

## 2023-01-01 RX ADMIN — FUROSEMIDE 80 MG: 10 INJECTION, SOLUTION INTRAMUSCULAR; INTRAVENOUS at 07:06

## 2023-01-01 RX ADMIN — IPRATROPIUM BROMIDE AND ALBUTEROL SULFATE 3 ML: 2.5; .5 SOLUTION RESPIRATORY (INHALATION) at 01:05

## 2023-01-01 RX ADMIN — IPRATROPIUM BROMIDE AND ALBUTEROL SULFATE 3 ML: .5; 3 SOLUTION RESPIRATORY (INHALATION) at 07:11

## 2023-01-01 RX ADMIN — LEVOTHYROXINE SODIUM 100 MCG: 0.1 TABLET ORAL at 07:11

## 2023-01-01 RX ADMIN — LORAZEPAM 1 MG: 2 INJECTION INTRAMUSCULAR; INTRAVENOUS at 08:11

## 2023-01-01 RX ADMIN — FUROSEMIDE 60 MG: 20 TABLET ORAL at 08:06

## 2023-01-01 RX ADMIN — ENOXAPARIN SODIUM 40 MG: 40 INJECTION SUBCUTANEOUS at 04:05

## 2023-01-01 RX ADMIN — IPRATROPIUM BROMIDE AND ALBUTEROL SULFATE 3 ML: 2.5; .5 SOLUTION RESPIRATORY (INHALATION) at 08:05

## 2023-01-01 RX ADMIN — FUROSEMIDE 60 MG: 10 INJECTION, SOLUTION INTRAMUSCULAR; INTRAVENOUS at 08:05

## 2023-01-01 RX ADMIN — POTASSIUM CHLORIDE 40 MEQ: 1500 TABLET, EXTENDED RELEASE ORAL at 10:06

## 2023-01-01 RX ADMIN — POLYETHYLENE GLYCOL 3350 17 G: 17 POWDER, FOR SOLUTION ORAL at 09:05

## 2023-01-01 RX ADMIN — DOCUSATE SODIUM 50MG AND SENNOSIDES 8.6MG 1 TABLET: 8.6; 5 TABLET, FILM COATED ORAL at 04:05

## 2023-01-01 RX ADMIN — FUROSEMIDE 40 MG: 10 INJECTION, SOLUTION INTRAMUSCULAR; INTRAVENOUS at 07:05

## 2023-01-01 RX ADMIN — IPRATROPIUM BROMIDE AND ALBUTEROL SULFATE 3 ML: 2.5; .5 SOLUTION RESPIRATORY (INHALATION) at 02:06

## 2023-01-01 RX ADMIN — DOCUSATE SODIUM 50MG AND SENNOSIDES 8.6MG 1 TABLET: 8.6; 5 TABLET, FILM COATED ORAL at 10:05

## 2023-01-01 RX ADMIN — BENZONATATE 200 MG: 100 CAPSULE ORAL at 08:11

## 2023-01-01 RX ADMIN — IBUPROFEN 400 MG: 400 TABLET ORAL at 06:05

## 2023-01-01 RX ADMIN — DIPHENHYDRAMINE HYDROCHLORIDE 50 MG: 50 INJECTION INTRAMUSCULAR; INTRAVENOUS at 05:06

## 2023-01-01 RX ADMIN — ALBUTEROL SULFATE 2.5 MG: 0.83 SOLUTION RESPIRATORY (INHALATION) at 09:11

## 2023-01-01 RX ADMIN — FUROSEMIDE 40 MG: 40 TABLET ORAL at 06:11

## 2023-01-01 RX ADMIN — FUROSEMIDE 60 MG: 20 TABLET ORAL at 05:06

## 2023-01-01 RX ADMIN — IPRATROPIUM BROMIDE AND ALBUTEROL SULFATE 3 ML: 2.5; .5 SOLUTION RESPIRATORY (INHALATION) at 06:05

## 2023-01-01 RX ADMIN — IPRATROPIUM BROMIDE AND ALBUTEROL SULFATE 3 ML: 2.5; .5 SOLUTION RESPIRATORY (INHALATION) at 01:06

## 2023-01-01 RX ADMIN — CARVEDILOL 3.12 MG: 3.12 TABLET, FILM COATED ORAL at 08:06

## 2023-01-01 RX ADMIN — RISPERIDONE 2 MG: 1 TABLET ORAL at 08:05

## 2023-01-01 RX ADMIN — FUROSEMIDE 40 MG: 10 INJECTION, SOLUTION INTRAMUSCULAR; INTRAVENOUS at 11:05

## 2023-01-01 RX ADMIN — LEVOTHYROXINE SODIUM 100 MCG: 25 TABLET ORAL at 08:05

## 2023-01-01 RX ADMIN — AMOXICILLIN AND CLAVULANATE POTASSIUM 1 TABLET: 875; 125 TABLET, COATED ORAL at 03:05

## 2023-01-01 RX ADMIN — SENNOSIDES AND DOCUSATE SODIUM 1 TABLET: 50; 8.6 TABLET ORAL at 08:11

## 2023-01-01 RX ADMIN — ASPIRIN 81 MG 81 MG: 81 TABLET ORAL at 08:06

## 2023-01-01 RX ADMIN — ATORVASTATIN CALCIUM 40 MG: 40 TABLET, FILM COATED ORAL at 10:06

## 2023-01-01 RX ADMIN — LORAZEPAM 2 MG: 2 INJECTION INTRAMUSCULAR; INTRAVENOUS at 05:06

## 2023-01-01 RX ADMIN — ATORVASTATIN CALCIUM 10 MG: 10 TABLET, FILM COATED ORAL at 09:05

## 2023-01-01 RX ADMIN — LIDOCAINE HYDROCHLORIDE 50 MG: 10 INJECTION, SOLUTION EPIDURAL; INFILTRATION; INTRACAUDAL; PERINEURAL at 03:06

## 2023-01-01 RX ADMIN — IPRATROPIUM BROMIDE 0.5 MG: 0.5 SOLUTION RESPIRATORY (INHALATION) at 01:06

## 2023-01-01 RX ADMIN — POLYETHYLENE GLYCOL 3350 17 G: 17 POWDER, FOR SOLUTION ORAL at 09:06

## 2023-01-01 RX ADMIN — CARVEDILOL 3.12 MG: 3.12 TABLET, FILM COATED ORAL at 09:06

## 2023-01-01 RX ADMIN — BENZONATATE 200 MG: 100 CAPSULE ORAL at 09:11

## 2023-01-01 RX ADMIN — IPRATROPIUM BROMIDE AND ALBUTEROL SULFATE 3 ML: .5; 3 SOLUTION RESPIRATORY (INHALATION) at 01:11

## 2023-01-01 RX ADMIN — FUROSEMIDE 40 MG: 40 TABLET ORAL at 08:11

## 2023-01-01 RX ADMIN — FUROSEMIDE 20 MG: 10 INJECTION, SOLUTION INTRAMUSCULAR; INTRAVENOUS at 07:11

## 2023-01-01 RX ADMIN — IBUPROFEN 400 MG: 400 TABLET ORAL at 12:05

## 2023-01-01 RX ADMIN — FUROSEMIDE 60 MG: 10 INJECTION, SOLUTION INTRAMUSCULAR; INTRAVENOUS at 07:05

## 2023-01-01 RX ADMIN — IBUPROFEN 400 MG: 400 TABLET ORAL at 11:05

## 2023-01-01 RX ADMIN — SPIRONOLACTONE 25 MG: 25 TABLET ORAL at 08:11

## 2023-01-01 RX ADMIN — SODIUM CHLORIDE 8 MG/HR: 9 INJECTION, SOLUTION INTRAVENOUS at 08:11

## 2023-01-01 RX ADMIN — DOCUSATE SODIUM 50MG AND SENNOSIDES 8.6MG 1 TABLET: 8.6; 5 TABLET, FILM COATED ORAL at 08:05

## 2023-01-01 RX ADMIN — FUROSEMIDE 20 MG: 10 INJECTION, SOLUTION INTRAMUSCULAR; INTRAVENOUS at 06:11

## 2023-01-01 RX ADMIN — ATORVASTATIN CALCIUM 10 MG: 10 TABLET, FILM COATED ORAL at 08:05

## 2023-01-01 RX ADMIN — LEVALBUTEROL HYDROCHLORIDE 1.25 MG: 1.25 SOLUTION RESPIRATORY (INHALATION) at 02:11

## 2023-01-01 RX ADMIN — SODIUM CHLORIDE 8 MG/HR: 900 INJECTION INTRAVENOUS at 05:11

## 2023-01-01 RX ADMIN — HALOPERIDOL LACTATE 10 MG: 5 INJECTION, SOLUTION INTRAMUSCULAR at 05:06

## 2023-01-01 RX ADMIN — FUROSEMIDE 60 MG: 20 TABLET ORAL at 07:06

## 2023-01-01 RX ADMIN — ALBUTEROL SULFATE 2.5 MG: 2.5 SOLUTION RESPIRATORY (INHALATION) at 08:11

## 2023-01-01 RX ADMIN — MUPIROCIN 1 G: 20 OINTMENT TOPICAL at 09:11

## 2023-01-01 RX ADMIN — BENZONATATE 200 MG: 100 CAPSULE ORAL at 01:11

## 2023-01-01 RX ADMIN — IBUPROFEN 400 MG: 400 TABLET ORAL at 08:05

## 2023-01-01 RX ADMIN — FUROSEMIDE 40 MG: 10 INJECTION, SOLUTION INTRAVENOUS at 09:11

## 2023-01-01 RX ADMIN — IBUPROFEN 400 MG: 400 TABLET ORAL at 10:05

## 2023-01-01 RX ADMIN — LEVOTHYROXINE SODIUM 100 MCG: 25 TABLET ORAL at 07:06

## 2023-01-01 RX ADMIN — IBUPROFEN 400 MG: 400 TABLET ORAL at 04:05

## 2023-01-01 RX ADMIN — POTASSIUM BICARBONATE 50 MEQ: 977.5 TABLET, EFFERVESCENT ORAL at 06:11

## 2023-01-01 RX ADMIN — LORAZEPAM 2 MG: 2 INJECTION INTRAMUSCULAR; INTRAVENOUS at 03:06

## 2023-01-01 RX ADMIN — FUROSEMIDE 60 MG: 10 INJECTION, SOLUTION INTRAMUSCULAR; INTRAVENOUS at 11:05

## 2023-01-01 RX ADMIN — LEVALBUTEROL HYDROCHLORIDE 1.25 MG: 1.25 SOLUTION RESPIRATORY (INHALATION) at 07:11

## 2023-01-01 RX ADMIN — FUROSEMIDE 40 MG: 20 TABLET ORAL at 03:05

## 2023-01-01 RX ADMIN — ACETAMINOPHEN 650 MG: 325 TABLET ORAL at 04:05

## 2023-01-01 RX ADMIN — SENNOSIDES AND DOCUSATE SODIUM 1 TABLET: 50; 8.6 TABLET ORAL at 09:11

## 2023-01-01 RX ADMIN — ERTAPENEM 1 G: 1 INJECTION INTRAMUSCULAR; INTRAVENOUS at 09:06

## 2023-01-01 RX ADMIN — IPRATROPIUM BROMIDE AND ALBUTEROL SULFATE 3 ML: 2.5; .5 SOLUTION RESPIRATORY (INHALATION) at 12:06

## 2023-01-01 RX ADMIN — FUROSEMIDE 60 MG: 10 INJECTION, SOLUTION INTRAMUSCULAR; INTRAVENOUS at 05:06

## 2023-01-01 RX ADMIN — IPRATROPIUM BROMIDE AND ALBUTEROL SULFATE 3 ML: 2.5; .5 SOLUTION RESPIRATORY (INHALATION) at 10:06

## 2023-01-01 RX ADMIN — SPIRONOLACTONE 25 MG: 25 TABLET ORAL at 09:06

## 2023-01-01 RX ADMIN — POLYETHYLENE GLYCOL 3350 17 G: 17 POWDER, FOR SOLUTION ORAL at 02:05

## 2023-01-01 RX ADMIN — PANTOPRAZOLE SODIUM 80 MG: 40 INJECTION, POWDER, LYOPHILIZED, FOR SOLUTION INTRAVENOUS at 08:11

## 2023-01-01 RX ADMIN — SODIUM CHLORIDE 8 MG/HR: 900 INJECTION INTRAVENOUS at 09:11

## 2023-01-01 RX ADMIN — POTASSIUM CHLORIDE 10 MEQ: 7.46 INJECTION, SOLUTION INTRAVENOUS at 11:06

## 2023-01-01 RX ADMIN — IPRATROPIUM BROMIDE AND ALBUTEROL SULFATE 3 ML: 2.5; .5 SOLUTION RESPIRATORY (INHALATION) at 08:06

## 2023-01-01 RX ADMIN — DOCUSATE SODIUM 50MG AND SENNOSIDES 8.6MG 1 TABLET: 8.6; 5 TABLET, FILM COATED ORAL at 08:06

## 2023-01-01 RX ADMIN — CARVEDILOL 3.12 MG: 3.12 TABLET, FILM COATED ORAL at 07:11

## 2023-01-01 RX ADMIN — CEFTRIAXONE SODIUM 1 G: 1 INJECTION, POWDER, FOR SOLUTION INTRAMUSCULAR; INTRAVENOUS at 01:06

## 2023-01-01 RX ADMIN — CEFTRIAXONE SODIUM 1 G: 1 INJECTION, POWDER, FOR SOLUTION INTRAMUSCULAR; INTRAVENOUS at 09:05

## 2023-01-01 RX ADMIN — CARVEDILOL 3.12 MG: 3.12 TABLET, FILM COATED ORAL at 04:11

## 2023-01-01 RX ADMIN — POLYETHYLENE GLYCOL 3350 17 G: 17 POWDER, FOR SOLUTION ORAL at 08:05

## 2023-01-01 RX ADMIN — SODIUM CHLORIDE: 9 INJECTION, SOLUTION INTRAVENOUS at 12:11

## 2023-01-01 RX ADMIN — LEVOTHYROXINE SODIUM 100 MCG: 25 TABLET ORAL at 06:05

## 2023-01-01 RX ADMIN — FUROSEMIDE 40 MG: 10 INJECTION, SOLUTION INTRAMUSCULAR; INTRAVENOUS at 01:11

## 2023-01-01 RX ADMIN — MUPIROCIN: 20 OINTMENT TOPICAL at 08:05

## 2023-01-01 RX ADMIN — ENOXAPARIN SODIUM 40 MG: 40 INJECTION SUBCUTANEOUS at 05:05

## 2023-01-01 RX ADMIN — FUROSEMIDE 40 MG: 20 TABLET ORAL at 09:06

## 2023-01-01 RX ADMIN — ASPIRIN 325 MG: 325 TABLET ORAL at 12:06

## 2023-05-17 PROBLEM — R79.89 TROPONIN LEVEL ELEVATED: Status: ACTIVE | Noted: 2023-01-01

## 2023-05-17 PROBLEM — I50.9 ACUTE ON CHRONIC HEART FAILURE: Status: ACTIVE | Noted: 2023-01-01

## 2023-05-17 PROBLEM — J44.9 COPD (CHRONIC OBSTRUCTIVE PULMONARY DISEASE): Status: ACTIVE | Noted: 2023-01-01

## 2023-05-17 PROBLEM — E03.9 HYPOTHYROIDISM: Status: ACTIVE | Noted: 2023-01-01

## 2023-05-17 PROBLEM — F31.9 BIPOLAR 1 DISORDER: Status: ACTIVE | Noted: 2023-01-01

## 2023-05-17 NOTE — ED NOTES
EKG completed. Patient placed on the cardiac monitor. Patient's room air saturation was 81%. Patient placed on 2L nasal cannula per nurses request.

## 2023-05-17 NOTE — HPI
Patient is 66 yo w/ hx of HF, COPD, Bipolar, Hypothyroidism presenting with acute worsening of SOB today while walking. Patient with 1 month hx of worsening SOB, Abdominal and LE swelling. She cannot lay flat. She is taking medications as prescribed. No fever, chills, cough, NVD. No chest pain.

## 2023-05-17 NOTE — PLAN OF CARE
05/17/23 1343   Discharge Assessment   Assessment Type Discharge Planning Assessment   Confirmed/corrected address, phone number and insurance Yes   Confirmed Demographics Correct on Facesheet   Source of Information patient   Does patient/caregiver understand observation status Yes   Communicated BEE with patient/caregiver Date not available/Unable to determine   Reason For Admission COPD exacerbation   People in Home alone   Facility Arrived From: [facility]   Do you expect to return to your current living situation? Yes   Do you have help at home or someone to help you manage your care at home? No  (pipe pays someone to help with household chores when needed)   Prior to hospitilization cognitive status: Unable to Assess   Current cognitive status: Alert/Oriented   Equipment Currently Used at Home cane, straight;walker, rolling;grab bar   Readmission within 30 days? No   Patient currently being followed by outpatient case management? Yes   If yes, name of outpatient case management following: insurance company assigned oupatient case management  ([facility] came for first visit)   Do you currently have service(s) that help you manage your care at home? No   Do you take prescription medications? Yes   Do you have prescription coverage? Yes   Do you have any problems affording any of your prescribed medications? No   How do you get to doctors appointments? health plan transportation   Are you on dialysis? No   Do you take coumadin? No   Discharge Plan A Assisted Living   Discharge Plan B Assisted Living   DME Needed Upon Discharge  none   Discharge Plan discussed with: Patient   Transition of Care Barriers None   Physical Activity   On average, how many days per week do you engage in moderate to strenuous exercise (like a brisk walk)? 1 day   On average, how many minutes do you engage in exercise at this level? 10 min   Financial Resource Strain   How hard is it for you to pay for the very basics  like food, housing, medical care, and heating? Not hard   Housing Stability   In the last 12 months, was there a time when you were not able to pay the mortgage or rent on time? N   In the last 12 months, was there a time when you did not have a steady place to sleep or slept in a shelter (including now)? N   Transportation Needs   In the past 12 months, has lack of transportation kept you from medical appointments or from getting medications? no   In the past 12 months, has lack of transportation kept you from meetings, work, or from getting things needed for daily living? No   Food Insecurity   Within the past 12 months, you worried that your food would run out before you got the money to buy more. Never true   Within the past 12 months, the food you bought just didn't last and you didn't have money to get more. Never true   Stress   Do you feel stress - tense, restless, nervous, or anxious, or unable to sleep at night because your mind is troubled all the time - these days? Not at all   Social Connections   In a typical week, how many times do you talk on the phone with family, friends, or neighbors? More than 3   How often do you get together with friends or relatives? Twice   How often do you attend Buddhist or Pentecostalism services? More than 4   Do you belong to any clubs or organizations such as Buddhist groups, unions, fraternal or athletic groups, or school groups? No   How often do you attend meetings of the clubs or organizations you belong to? Never   Are you , , , , never , or living with a partner?    Alcohol Use   Q1: How often do you have a drink containing alcohol? Never   Q2: How many drinks containing alcohol do you have on a typical day when you are drinking? None   Q3: How often do you have six or more drinks on one occasion? Never     Assessment completed with patient who was alert and oriented. Patient lives alone and [facility]. She uses  a straight cane and rollator at home. Patient does not drive and depends on medicare transportation and others to help with transportation. Patient usually pays someone to run her errands and shopping as well as clean her house when she needs assistance. Her PCP is FRANSISCA Rosario and she also sees a cardiologist, Dr. Benitez of [facility]. Her pharmacy of choice is [facility]. Patient stated that she has had one visit from [facility] outpatient case management. She does not participate in outside therapies. Patient denies any additional needs at this time. Case management will continue to follow.

## 2023-05-17 NOTE — PLAN OF CARE
05/17/23 1339   FOWLER Message   Medicare Outpatient and Observation Notification regarding financial responsibility Explained to patient/caregiver;Signed/date by patient/caregiver   Date FOWLER was signed 05/17/23   Time FOWLER was signed [id number]

## 2023-05-17 NOTE — Clinical Note
Diagnosis: COPD exacerbation [105743]   Future Attending Provider: MARYURI ZHENG [[id number]]   Admitting Provider:: MARYURI ZHENG [[id number]]

## 2023-05-17 NOTE — PLAN OF CARE
Problem: Adult Inpatient Plan of Care  Goal: Plan of Care Review  5/17/2023 1559 by Domitila Madrid RN  Outcome: Ongoing, Progressing  5/17/2023 1558 by Domitila Madrid RN  Outcome: Ongoing, Progressing  Goal: Patient-Specific Goal (Individualized)  5/17/2023 1559 by Domitila Madrid RN  Outcome: Ongoing, Progressing  5/17/2023 1558 by Domitila Madrid RN  Outcome: Ongoing, Progressing  Goal: Absence of Hospital-Acquired Illness or Injury  5/17/2023 1559 by Domitila Madrid RN  Outcome: Ongoing, Progressing  5/17/2023 1558 by Domitila Madrid RN  Outcome: Ongoing, Progressing  Goal: Optimal Comfort and Wellbeing  5/17/2023 1559 by Domitila Madrid RN  Outcome: Ongoing, Progressing  5/17/2023 1558 by Domitila Madrid RN  Outcome: Ongoing, Progressing  Goal: Readiness for Transition of Care  5/17/2023 1559 by Domitila Madrid RN  Outcome: Ongoing, Progressing  5/17/2023 1558 by Domitila Madrid RN  Outcome: Ongoing, Progressing     Problem: Bariatric Environmental Safety  Goal: Safety Maintained with Care  5/17/2023 1559 by Domitila Madrid RN  Outcome: Ongoing, Progressing  5/17/2023 1558 by Domitila Madrid RN  Outcome: Ongoing, Progressing     Problem: Skin Injury Risk Increased  Goal: Skin Health and Integrity  5/17/2023 1559 by Domitila Madrid RN  Outcome: Ongoing, Progressing  5/17/2023 1558 by Domitila Madrid RN  Outcome: Ongoing, Progressing

## 2023-05-17 NOTE — ED PROVIDER NOTES
Please note that my documentation in this Electronic Healthcare Record was produced using speech recognition software and therefore may contain errors related to that software.These could include grammar, punctuation and spelling errors or the inclusion/ exclusion of phrases that were not intended. Please contact myself for any clarification, questions or concerns.    HPI: Patient is a 67 y.o. female who presents with the chief complaint of shortness of breath x-rays prior to arrival.  Patient was getting in the car after eating at [employer].  She is history of CHF and has a baseline shortness of breath.  States it worsened today.  Denies any cough, rhinorrhea, congestion, chest pain, nausea vomiting, fever.  Former tobacco user.  Admits to taking Lasix twice a day.  She is had some swelling in her legs.  Follows with cardiologist in [place].    REVIEW OF SYSTEMS - 10 systems were independently reviewed and are otherwise negative with the exception of those items previously documented in the HPI and nursing notes.    Allergy: Cortisone, Iodine, and Iodine and iodide containing products    Past medical history:   Past Medical History:   Diagnosis Date    Anxiety     Bipolar 1 disorder     Breast cancer     CHF (congestive heart failure)     COPD (chronic obstructive pulmonary disease)     Depression     Diabetes mellitus     Hypertension     PVD (peripheral vascular disease)     Schizophrenia     Thyroid disease        Surgical History:   Past Surgical History:   Procedure Laterality Date    BREAST LUMPECTOMY      TONSILLECTOMY         Social history:   Social History     Socioeconomic History    Marital status: Single   Tobacco Use    Smoking status: Former     Types: Cigarettes     Passive exposure: Never    Smokeless tobacco: Never   Substance and Sexual Activity    Alcohol use: Not Currently    Drug use: Never    Sexual activity: Not Currently     Birth control/protection: Post-menopausal   Social  "History Narrative    ** Merged History Encounter **            Family history: non-contributory    EHR: reviewed    Vitals: BP (!) 142/61 (BP Location: Left arm, Patient Position: Sitting)   Pulse 101   Temp 98.1 °F (36.7 °C)   Resp 18   Ht 5' 2" (1.575 m)   Wt 109.8 kg (242 lb)   SpO2 96%   Breastfeeding No   BMI 44.26 kg/m²     PHYSICAL EXAM:    General-59-year-old female awake and alert, oriented, GCS 15, in no acute distress,  HEENT- normocephalic, atraumatic, sclera anicteric, moist mucous membranes,   CARDIOVASCULAR- regular rate and rhythm, systolic murmur noted, normal S1-S2  PULMONARY- nonlabored, no respiratory distress, crackles noted but no wheezing.  chest expansion symmetrical  NEUROLOGIC- mental status normal, speech fluid, cognition normal, CN II-XII grossly intact, ambulatory with proper gait.  MUSCULOSKELETAL- well-nourished, well-developed, 1+ pitting edema bilateral lower extremities  DERMATOLOGIC- warm and dry, no visible rashes  PSYCHIATRIC- normal affect, normal concentration           Labs Reviewed   CBC W/ AUTO DIFFERENTIAL - Abnormal; Notable for the following components:       Result Value    Hemoglobin 8.3 (*)     Hematocrit 31.4 (*)     MCV 68 (*)     MCH 17.9 (*)     MCHC 26.4 (*)     RDW 19.9 (*)     Immature Granulocytes 0.7 (*)     Immature Grans (Abs) 0.07 (*)     All other components within normal limits    Narrative:     Release to patient->Immediate   COMPREHENSIVE METABOLIC PANEL - Abnormal; Notable for the following components:    CO2 32 (*)     Glucose 165 (*)     Total Bilirubin 1.2 (*)     ALT 7 (*)     All other components within normal limits    Narrative:     Release to patient->Immediate   TROPONIN I - Abnormal; Notable for the following components:    Troponin I 0.027 (*)     All other components within normal limits    Narrative:     Release to patient->Immediate   B-TYPE NATRIURETIC PEPTIDE - Abnormal; Notable for the following components:     (*)     " All other components within normal limits    Narrative:     Release to patient->Immediate   LACTIC ACID, PLASMA - Abnormal; Notable for the following components:    Lactate (Lactic Acid) 2.7 (*)     All other components within normal limits   URINALYSIS, REFLEX TO URINE CULTURE - Abnormal; Notable for the following components:    Occult Blood UA Trace (*)     Leukocytes, UA 1+ (*)     All other components within normal limits    Narrative:     Preferred Collection Type->Urine, Clean Catch  Specimen Source->Urine   ISTAT PROCEDURE - Abnormal; Notable for the following components:    POC PCO2 61.0 (*)     POC PO2 61 (*)     POC HCO3 38.0 (*)     POC SATURATED O2 90 (*)     All other components within normal limits   INFLUENZA A & B BY MOLECULAR   CULTURE, BLOOD   CULTURE, BLOOD   PROTIME-INR    Narrative:     Release to patient->Immediate   SARS-COV-2 RNA AMPLIFICATION, QUAL    Narrative:     Is the patient symptomatic?->Yes   URINALYSIS MICROSCOPIC    Narrative:     Preferred Collection Type->Urine, Clean Catch  Specimen Source->Urine   HIV 1 / 2 ANTIBODY   HEPATITIS C ANTIBODY       X-Ray Chest AP Portable   Final Result      Mild CHF.  Pulmonary arterial hypertension.         Electronically signed by: Radha Delaney   Date:    05/17/2023   Time:    10:41        EKG 05/17/2023 10:20 a.m..  Interpreted by me but reviewed by Dr. Mcclure.  Sinus tachycardia 103 beats per minute.  Prolonged . Prolonged .  Left axis deviation.  No ST segment changes or T-wave inversions noted.    MEDICAL DECISION MAKING: Patient is a 67 y.o. female who presented with chief complaint of shortness of breath.  Denies chest pain, cough, congestion, fever, GI or  symptoms.  Has history of COPD and CHF.  Follows with cardiology.  Patient arrived hypoxic 81% on room air.  Immediately placed on nasal cannula 2 L with improvement in O2 saturation to 96%.  She is able to speak in full sentences.  She is a systolic murmur  which is chronic.  She had some crackles noted but no wheezing.  She has significant edema of the bilateral lower extremities.  She is on Lasix daily.  Differentials considered, COPD versus CHF exacerbation, pneumonia, sepsis, respiratory failure, etc..  Patient ABG was concerning for elevated CO2.  She is placed on Vapotherm.  Recommended BiPAP but patient does not tolerate this well and declined.  She is given a dose of Lasix.  X-ray reviewed by Radiology shows mild CHF and pulmonary arterial hypertension.  COVID and flu were negative.  She has an anemia with hemoglobin of 8.3 but states this is chronic.  Denies any blood in stool or dark-colored stool, hematemesis, hemoptysis.  There is minimal blood in the urine.  Troponin is chronically elevated but not worse than normal.  BNP minimally elevated 250.  Patient will be admitted to the hospital for CHF exacerbation. Patient accepted by Dr. Keane.    CLINICAL IMPRESSION:  1. Shortness of breath    2. Acute on chronic congestive heart failure, unspecified heart failure type                SHANELL Logan  05/17/23 3244

## 2023-05-18 NOTE — SUBJECTIVE & OBJECTIVE
"Past Medical History:   Diagnosis Date    Anxiety     Bipolar 1 disorder     Breast cancer     CHF (congestive heart failure)     COPD (chronic obstructive pulmonary disease)     Depression     Diabetes mellitus     Hypertension     PVD (peripheral vascular disease)     Schizophrenia     Thyroid disease        Past Surgical History:   Procedure Laterality Date    BREAST LUMPECTOMY      TONSILLECTOMY         Review of patient's allergies indicates:   Allergen Reactions    Cortisone      Other reaction(s): "breaks me out"    Iodine Hives    Iodine and iodide containing products        No current facility-administered medications on file prior to encounter.     Current Outpatient Medications on File Prior to Encounter   Medication Sig    furosemide (LASIX) 40 MG tablet Take 1 tablet (40 mg total) by mouth once daily. (Patient taking differently: Take 40 mg by mouth 2 (two) times a day.)    levothyroxine (SYNTHROID) 100 MCG tablet Take 1 tablet (100 mcg total) by mouth once daily.    lovastatin (MEVACOR) 10 MG tablet Take 1 tablet (10 mg total) by mouth every evening.    risperiDONE (RISPERDAL) 2 MG tablet Take 2 mg by mouth every evening.    ABILIFY 30 mg Tab Take 30 mg by mouth nightly.    albuterol sulfate 90 mcg/actuation aebs Inhale into the lungs.    lisinopriL (PRINIVIL,ZESTRIL) 20 MG tablet Take 1 tablet (20 mg total) by mouth once daily.    potassium chloride (KLOR-CON) 10 MEQ TbSR Take 10 mEq by mouth once.    TRUE METRIX AIR GLUCOSE METER Misc USE METER TO CHECK GLUCOSE TWICE DAILY    TRUE METRIX GLUCOSE TEST STRIP Strp USE 1 STRIP TO CHECK GLUCOSE TWICE DAILY    VENTOLIN HFA 90 mcg/actuation inhaler INHALE 2 PUFFS BY MOUTH EVERY 6 TO 8 HOURS AS NEEDED     Family History       Problem Relation (Age of Onset)    Lung cancer Mother          Tobacco Use    Smoking status: Former     Types: Cigarettes     Passive exposure: Never    Smokeless tobacco: Never   Substance and Sexual Activity    Alcohol use: Not " Currently    Drug use: Never    Sexual activity: Not Currently     Birth control/protection: Post-menopausal     Review of Systems   All other systems reviewed and are negative.  Objective:     Vital Signs (Most Recent):  Temp: 97.6 °F (36.4 °C) (05/17/23 1904)  Pulse: 89 (05/17/23 1904)  Resp: 18 (05/17/23 1904)  BP: 135/62 (05/17/23 1904)  SpO2: (!) 94 % (05/17/23 1904) Vital Signs (24h Range):  Temp:  [97.3 °F (36.3 °C)-98.1 °F (36.7 °C)] 97.6 °F (36.4 °C)  Pulse:  [] 89  Resp:  [18-24] 18  SpO2:  [81 %-97 %] 94 %  BP: (135-154)/(58-73) 135/62     Weight: 109 kg (240 lb 4.8 oz)  Body mass index is 43.95 kg/m².     Physical Exam  Vitals reviewed.   Constitutional:       General: She is not in acute distress.     Appearance: Normal appearance.   HENT:      Head: Normocephalic and atraumatic.   Eyes:      Extraocular Movements: Extraocular movements intact.      Pupils: Pupils are equal, round, and reactive to light.   Cardiovascular:      Rate and Rhythm: Normal rate and regular rhythm.   Pulmonary:      Effort: Pulmonary effort is normal. No respiratory distress.   Abdominal:      General: There is distension.      Palpations: Abdomen is soft.      Tenderness: There is no abdominal tenderness.   Musculoskeletal:      Right lower leg: Edema present.      Left lower leg: Edema present.   Skin:     General: Skin is warm and dry.   Neurological:      General: No focal deficit present.      Mental Status: She is alert and oriented to person, place, and time.   Psychiatric:         Mood and Affect: Mood normal.         Behavior: Behavior normal.            CRANIAL NERVES     CN III, IV, VI   Pupils are equal, round, and reactive to light.     Significant Labs: All pertinent labs within the past 24 hours have been reviewed.    Significant Imaging: I have reviewed all pertinent imaging results/findings within the past 24 hours.

## 2023-05-18 NOTE — ASSESSMENT & PLAN NOTE
Troponin elevation in setting of volume overload and acute hypoxia  TTE completed- awaiting read  Trended to peak

## 2023-05-18 NOTE — SUBJECTIVE & OBJECTIVE
Interval History: Good UOP overnight. Continues to have significant shortness of breath. Continue IV lasix today. Schedule breathing treatments today. Weaning O2 as tolerated.     Review of Systems   All other systems reviewed and are negative.  Objective:     Vital Signs (Most Recent):  Temp: 97.8 °F (36.6 °C) (05/18/23 0720)  Pulse: 82 (05/18/23 0754)  Resp: 18 (05/18/23 0754)  BP: 134/64 (05/18/23 0720)  SpO2: 96 % (05/18/23 0754) Vital Signs (24h Range):  Temp:  [96.8 °F (36 °C)-98.1 °F (36.7 °C)] 97.8 °F (36.6 °C)  Pulse:  [] 82  Resp:  [18-24] 18  SpO2:  [81 %-98 %] 96 %  BP: (112-154)/(58-73) 134/64     Weight: 109 kg (240 lb 4.8 oz)  Body mass index is 43.95 kg/m².    Intake/Output Summary (Last 24 hours) at 5/18/2023 0937  Last data filed at 5/18/2023 0623  Gross per 24 hour   Intake 1200 ml   Output 2400 ml   Net -1200 ml         Physical Exam      Vitals reviewed  General: NAD, Obese  Head: NC/AT  Eyes: EOMI, HONEY  Cardiovascular: Pulses intact distally, Regular Rate and rhythm  Pulmonary: Normal Respiratory Rate, No respiratory distress  Gi: Soft, Non-tender  Extremities: Warm, Edema improved  Skin: Warm, dry  Neuro: Alert, Oriented x3, No focal Deficit  Psych: Appropriate mood and affect      Significant Labs: All pertinent labs within the past 24 hours have been reviewed.    Significant Imaging: I have reviewed all pertinent imaging results/findings within the past 24 hours.

## 2023-05-18 NOTE — PLAN OF CARE
[facility] - Intensive Care  Discharge Reassessment    Primary Care Provider: FRANSISCA Rodriguez    Expected Discharge Date:      Patient resting in bed. She lives in [facility]. She is open to having home health. Unsure who is in network but will let her know which company can take her insurance. Her PCP is Aimee Marques NP. Will get her an appointment. Plan to wean off oxygen today & possibly discharge tomorrow. Will continue  to follow.  Reassessment (most recent)       Discharge Reassessment - 05/18/23 0820          Discharge Reassessment    Assessment Type Discharge Planning Reassessment     Did the patient's condition or plan change since previous assessment? No     Discharge Plan discussed with: Patient     Communicated BEE with patient/caregiver Yes     Discharge Plan A Home Health     Discharge Plan B Home     DME Needed Upon Discharge  none     Transition of Care Barriers None     Why the patient remains in the hospital Requires continued medical care        Post-Acute Status    Post-Acute Authorization Home Health     Home Health Status Referrals Sent     Discharge Delays None known at this time

## 2023-05-18 NOTE — ASSESSMENT & PLAN NOTE
Acute hypoxic respiratory failure 2/2 Acute on chronic HF exacerbation- unknown subtype  Received IV lasix x1 and did not return to baseline  Continue IV lasix for goal net negative 1.5-2L daily  TTE completed-awaiting read  Keep K>4, Mg>2  Daily weights  Strict I's and O's  Tele w/ pulse ox  Wean O2 as tolerated to maintain O2>88

## 2023-05-18 NOTE — PT/OT/SLP EVAL
Occupational Therapy   Evaluation    Name: Radha Alfred  MRN: [id number]  Admitting Diagnosis: Acute on chronic heart failure  Recent Surgery: * No surgery found *      Recommendations:     Discharge Recommendations:  Home with home health vs skilled nursing facility  Discharge Equipment Recommendations:   None  Barriers to discharge:   None    Assessment:   Patient is 68 yo w/ hx of HF, COPD, Bipolar, Hypothyroidism presenting with acute worsening of SOB today while walking. Patient with 1 month hx of worsening SOB, Abdominal and LE swelling. She cannot lay flat. She is taking medications as prescribed. No fever, chills, cough, NVD. No chest pain.        Rehab Prognosis: Good     Plan:     Patient to be seen   to address the above listed problems via    Plan of Care Expires:  When patient is discharged from hospital.  Patient will be treated 3-5x per week.  Plan of Care Reviewed with:  Patient     Subjective       Occupational Profile:  Living Environment: Patient lives alone in an apartment on the first floor.  Previous level of function: Independent   Equipment Used at Home:  Straight cane and rolling walker  Assistance upon Discharge: Patient lives alone    Pain/Comfort:   No pain      Objective:     Communicated with: OT spoke with patient's nurse prior to entering patient's room for evaluation.    General Precautions: Standard,    Orthopedic Precautions:    Braces:    Respiratory Status: Room air     Occupational Performance:    Bed Mobility:    Patient requires Lasha with bed mobility.    Functional Mobility/Transfers:  Patient requires Lasha with functional transfers.    Activities of Daily Living:  Patient requires modA with ADLs.    Cognitive/Visual Perceptual:  Patient is alert and oriented x4.    Physical Exam:  Patient does not have any functional deficits with BUE.      Treatment & Education:  Patient tolerated evaluation well on this date.  Patient requires Lasha with bed mobility and functional transfers.           GOALS:   Patient will require CGA with UE dressing.  Patient will require Lasha with LE dressing.  Patient will require SBA with grooming.      History:     Past Medical History:   Diagnosis Date    Anxiety     Bipolar 1 disorder     Breast cancer     CHF (congestive heart failure)     COPD (chronic obstructive pulmonary disease)     Depression     Diabetes mellitus     Hypertension     PVD (peripheral vascular disease)     Schizophrenia     Thyroid disease          Past Surgical History:   Procedure Laterality Date    BREAST LUMPECTOMY      TONSILLECTOMY         Time Tracking:     OT Date of Treatment:  5/18/2023  OT Start Time:  10:00  OT Stop Time:  10:23  OT Total Time (min):  23 minutes     Cruzito Tai OTR/L    5/18/2023

## 2023-05-18 NOTE — PROGRESS NOTES
[facility] Medicine  Progress Note    Patient Name: Radha Alfred  MRN: [id number]  Patient Class: OP- Observation   Admission Date: 5/17/2023  Length of Stay: 0 days  Attending Physician: Yariel Keane MD  Primary Care Provider: FRANSISCA Rodriguez        Subjective:     Principal Problem:Acute on chronic heart failure        HPI:  Patient is 66 yo w/ hx of HF, COPD, Bipolar, Hypothyroidism presenting with acute worsening of SOB today while walking. Patient with 1 month hx of worsening SOB, Abdominal and LE swelling. She cannot lay flat. She is taking medications as prescribed. No fever, chills, cough, NVD. No chest pain.       Overview/Hospital Course:  No notes on file    Interval History: Good UOP overnight. Continues to have significant shortness of breath. Continue IV lasix today. Schedule breathing treatments today. Weaning O2 as tolerated.     Review of Systems   All other systems reviewed and are negative.  Objective:     Vital Signs (Most Recent):  Temp: 97.8 °F (36.6 °C) (05/18/23 0720)  Pulse: 82 (05/18/23 0754)  Resp: 18 (05/18/23 0754)  BP: 134/64 (05/18/23 0720)  SpO2: 96 % (05/18/23 0754) Vital Signs (24h Range):  Temp:  [96.8 °F (36 °C)-98.1 °F (36.7 °C)] 97.8 °F (36.6 °C)  Pulse:  [] 82  Resp:  [18-24] 18  SpO2:  [81 %-98 %] 96 %  BP: (112-154)/(58-73) 134/64     Weight: 109 kg (240 lb 4.8 oz)  Body mass index is 43.95 kg/m².    Intake/Output Summary (Last 24 hours) at 5/18/2023 0937  Last data filed at 5/18/2023 0623  Gross per 24 hour   Intake 1200 ml   Output 2400 ml   Net -1200 ml         Physical Exam      Vitals reviewed  General: NAD, Obese  Head: NC/AT  Eyes: EOMI, HONEY  Cardiovascular: Pulses intact distally, Regular Rate and rhythm  Pulmonary: Normal Respiratory Rate, No respiratory distress  Gi: Soft, Non-tender  Extremities: Warm, Edema improved  Skin: Warm, dry  Neuro: Alert, Oriented x3, No focal Deficit  Psych: Appropriate mood and  affect      Significant Labs: All pertinent labs within the past 24 hours have been reviewed.    Significant Imaging: I have reviewed all pertinent imaging results/findings within the past 24 hours.      Assessment/Plan:      * Acute on chronic heart failure  Acute hypoxic respiratory failure 2/2 Acute on chronic HF exacerbation- unknown subtype  Received IV lasix x1 and did not return to baseline  Continue IV lasix for goal net negative 1.5-2L daily  TTE completed-awaiting read  Keep K>4, Mg>2  Daily weights  Strict I's and O's  Tele w/ pulse ox  Wean O2 as tolerated to maintain O2>88      Troponin level elevated  Troponin elevation in setting of volume overload and acute hypoxia  TTE completed- awaiting read  Trended to peak      Hypothyroidism  Continue synthroid      Bipolar 1 disorder  Continue risperidone      COPD (chronic obstructive pulmonary disease)  Not in acute exacerbation  Schedule treatments today        VTE Risk Mitigation (From admission, onward)         Ordered     enoxaparin injection 40 mg  Daily         05/17/23 1308     IP VTE HIGH RISK PATIENT  Once         05/17/23 1308     Place sequential compression device  Until discontinued         05/17/23 1308                Discharge Planning   BEE:      Code Status: Full Code   Is the patient medically ready for discharge?:     Reason for patient still in hospital (select all that apply): Treatment  Discharge Plan A: Assisted Living                  Yariel Keane MD  Department of Hospital Medicine   [facility] Intensive [facility] Department

## 2023-05-18 NOTE — H&P
"[facility] Medicine  History & Physical    Patient Name: Radha Alfred  MRN: [id number]  Patient Class: OP- Observation  Admission Date: 5/17/2023  Attending Physician: Yariel Keane MD   Primary Care Provider: FRANSISCA Rodriguez         Patient information was obtained from patient and ER records.     Subjective:     Principal Problem:Acute on chronic heart failure    Chief Complaint:   Chief Complaint   Patient presents with    Shortness of Breath     Shortness on breath onset 30 minutes ago. History of congestive heart failure. Followed by Dr Marques and Dr Tello.        HPI: Patient is 68 yo w/ hx of HF, COPD, Bipolar, Hypothyroidism presenting with acute worsening of SOB today while walking. Patient with 1 month hx of worsening SOB, Abdominal and LE swelling. She cannot lay flat. She is taking medications as prescribed. No fever, chills, cough, NVD. No chest pain.       Past Medical History:   Diagnosis Date    Anxiety     Bipolar 1 disorder     Breast cancer     CHF (congestive heart failure)     COPD (chronic obstructive pulmonary disease)     Depression     Diabetes mellitus     Hypertension     PVD (peripheral vascular disease)     Schizophrenia     Thyroid disease        Past Surgical History:   Procedure Laterality Date    BREAST LUMPECTOMY      TONSILLECTOMY         Review of patient's allergies indicates:   Allergen Reactions    Cortisone      Other reaction(s): "breaks me out"    Iodine Hives    Iodine and iodide containing products        No current facility-administered medications on file prior to encounter.     Current Outpatient Medications on File Prior to Encounter   Medication Sig    furosemide (LASIX) 40 MG tablet Take 1 tablet (40 mg total) by mouth once daily. (Patient taking differently: Take 40 mg by mouth 2 (two) times a day.)    levothyroxine (SYNTHROID) 100 MCG tablet Take 1 tablet (100 mcg total) by mouth once daily.    lovastatin " (MEVACOR) 10 MG tablet Take 1 tablet (10 mg total) by mouth every evening.    risperiDONE (RISPERDAL) 2 MG tablet Take 2 mg by mouth every evening.    ABILIFY 30 mg Tab Take 30 mg by mouth nightly.    albuterol sulfate 90 mcg/actuation aebs Inhale into the lungs.    lisinopriL (PRINIVIL,ZESTRIL) 20 MG tablet Take 1 tablet (20 mg total) by mouth once daily.    potassium chloride (KLOR-CON) 10 MEQ TbSR Take 10 mEq by mouth once.    TRUE METRIX AIR GLUCOSE METER Misc USE METER TO CHECK GLUCOSE TWICE DAILY    TRUE METRIX GLUCOSE TEST STRIP Strp USE 1 STRIP TO CHECK GLUCOSE TWICE DAILY    VENTOLIN HFA 90 mcg/actuation inhaler INHALE 2 PUFFS BY MOUTH EVERY 6 TO 8 HOURS AS NEEDED     Family History       Problem Relation (Age of Onset)    Lung cancer Mother          Tobacco Use    Smoking status: Former     Types: Cigarettes     Passive exposure: Never    Smokeless tobacco: Never   Substance and Sexual Activity    Alcohol use: Not Currently    Drug use: Never    Sexual activity: Not Currently     Birth control/protection: Post-menopausal     Review of Systems   All other systems reviewed and are negative.  Objective:     Vital Signs (Most Recent):  Temp: 97.6 °F (36.4 °C) (05/17/23 1904)  Pulse: 89 (05/17/23 1904)  Resp: 18 (05/17/23 1904)  BP: 135/62 (05/17/23 1904)  SpO2: (!) 94 % (05/17/23 1904) Vital Signs (24h Range):  Temp:  [97.3 °F (36.3 °C)-98.1 °F (36.7 °C)] 97.6 °F (36.4 °C)  Pulse:  [] 89  Resp:  [18-24] 18  SpO2:  [81 %-97 %] 94 %  BP: (135-154)/(58-73) 135/62     Weight: 109 kg (240 lb 4.8 oz)  Body mass index is 43.95 kg/m².     Physical Exam  Vitals reviewed.   Constitutional:       General: She is not in acute distress.     Appearance: Normal appearance.   HENT:      Head: Normocephalic and atraumatic.   Eyes:      Extraocular Movements: Extraocular movements intact.      Pupils: Pupils are equal, round, and reactive to light.   Cardiovascular:      Rate and Rhythm: Normal rate and  regular rhythm.   Pulmonary:      Effort: Pulmonary effort is normal. No respiratory distress.   Abdominal:      General: There is distension.      Palpations: Abdomen is soft.      Tenderness: There is no abdominal tenderness.   Musculoskeletal:      Right lower leg: Edema present.      Left lower leg: Edema present.   Skin:     General: Skin is warm and dry.   Neurological:      General: No focal deficit present.      Mental Status: She is alert and oriented to person, place, and time.   Psychiatric:         Mood and Affect: Mood normal.         Behavior: Behavior normal.            CRANIAL NERVES     CN III, IV, VI   Pupils are equal, round, and reactive to light.     Significant Labs: All pertinent labs within the past 24 hours have been reviewed.    Significant Imaging: I have reviewed all pertinent imaging results/findings within the past 24 hours.    Assessment/Plan:     * Acute on chronic heart failure  Acute hypoxic respiratory failure 2/2 Acute on chronic HF exacerbation- unknown subtype  Received IV lasix x1 and did not return to baseline  Continue IV lasix for goal net negative 1.5-2L daily  TTE in am  Keep K>4, Mg>2  Daily weights  Strict I's and O's  Tele w/ pulse ox  Wean O2 as tolerated to maintain O2>88      Troponin level elevated  Troponin elevation in setting of volume overload and acute hypoxia  TTE in am  Trended to peak      Hypothyroidism  Continue synthroid      Bipolar 1 disorder  Continue risperidone      COPD (chronic obstructive pulmonary disease)  Not in acute exacerbation  PRN nebs        VTE Risk Mitigation (From admission, onward)         Ordered     enoxaparin injection 40 mg  Daily         05/17/23 1308     IP VTE HIGH RISK PATIENT  Once         05/17/23 1308     Place sequential compression device  Until discontinued         05/17/23 1308                   On 05/17/2023, patient should be placed in hospital observation services under my care.        Yariel Keane,  MD  Department of [facility] Medicine  [place] - Intensive Care

## 2023-05-19 NOTE — PLAN OF CARE
05/19/23 1220   Medicare Message   Important Message from Medicare regarding Discharge Appeal Rights Given to patient/caregiver;Explained to patient/caregiver;Signed/date by patient/caregiver   Date IMM was signed 05/19/23   Time IMM was signed 1220

## 2023-05-19 NOTE — PT/OT/SLP PROGRESS
Occupational Therapy   Treatment    Name: Radha Alfred  MRN: [id number]  Admitting Diagnosis:  Acute on chronic heart failure       Recommendations:     Discharge Recommendations:  Home with home health vs Skilled nursing facility  Discharge Equipment Recommendations:   None  Barriers to discharge:   None    Assessment:   Patient is 66 yo w/ hx of HF, COPD, Bipolar, Hypothyroidism presenting with acute worsening of SOB today while walking. Patient with 1 month hx of worsening SOB, Abdominal and LE swelling. She cannot lay flat. She is taking medications as prescribed. No fever, chills, cough, NVD. No chest pain.      Rehab Prognosis:  Good     Plan:     Patient to be seen   to address the above listed problems via    Plan of Care Expires:  When patient is discharged from hospital.  Patient will be treated 3-5x per week.  Plan of Care Reviewed with:  Patient     Subjective     Pain/Comfort:   RLE 8/10    Objective:     Communicated with: OT spoke with patient's nurse prior to entering patient's room.    General Precautions: Standard,      Orthopedic Precautions:   Braces:    Respiratory Status: 2L  O2     Occupational Performance:     Bed Mobility:    Bed level treatment session performed due to right foot pain.     Functional Mobility/Transfers:  Not assessed     Activities of Daily Living:  Patient requires modA with ADLs.      Treatment & Education:  Patient tolerated bed level treatment.  Patient engaged in bedlevel treatment session due to right foot pain.      GOALS: See initial evaluation      Time Tracking:     OT Date of Treatment:  5/19/2023  OT Start Time:  10:55  OT Stop Time:  11:10  OT Total Time (min):  15 minutes     Cruzito Tai OTR/L               5/19/2023

## 2023-05-19 NOTE — SUBJECTIVE & OBJECTIVE
Interval History: Continue IV lasix and Duonebs. Still requiring O2.     Review of Systems   All other systems reviewed and are negative.  Objective:     Vital Signs (Most Recent):  Temp: 98.6 °F (37 °C) (05/19/23 1056)  Pulse: 84 (05/19/23 1056)  Resp: (!) 22 (05/19/23 1056)  BP: 139/63 (05/19/23 1056)  SpO2: 95 % (05/19/23 1056) Vital Signs (24h Range):  Temp:  [97 °F (36.1 °C)-98.8 °F (37.1 °C)] 98.6 °F (37 °C)  Pulse:  [75-94] 84  Resp:  [12-22] 22  SpO2:  [86 %-98 %] 95 %  BP: (108-139)/(52-63) 139/63     Weight: 108.2 kg (238 lb 8.6 oz)  Body mass index is 43.63 kg/m².    Intake/Output Summary (Last 24 hours) at 5/19/2023 1135  Last data filed at 5/19/2023 0832  Gross per 24 hour   Intake 720 ml   Output 2702 ml   Net -1982 ml         Physical Exam      Vitals reviewed  General: NAD, Well developed, Well Nourished  Head: NC/AT  Eyes: EOMI, HONEY  Cardiovascular: Pulses intact distally, Regular Rate and rhythm  Pulmonary: Increased Respiratory Rate, No respiratory distress  Gi: Soft, Non-tender  Extremities: Warm, No edema present currently  Skin: Warm, dry  Neuro: Alert, Oriented x3, No focal Deficit  Psych: Appropriate mood and affect      Significant Labs: All pertinent labs within the past 24 hours have been reviewed.    Significant Imaging: I have reviewed all pertinent imaging results/findings within the past 24 hours.

## 2023-05-19 NOTE — PLAN OF CARE
[facility] - Intensive Care  Discharge Reassessment    Primary Care Provider: FRANSISCA Rodriguez    Expected Discharge Date:     Patient resting in bed alert & oriented. Still requiring oxygen. [facility] can take her once she is ready for discharge. Would like to get established with GI doctor. She is ok using Dr Calvo. Will get follow up appointments. Denies any other needs at this time.     Reassessment (most recent)       Discharge Reassessment - 05/19/23 1220          Discharge Reassessment    Assessment Type Discharge Planning Reassessment     Did the patient's condition or plan change since previous assessment? No     Discharge Plan discussed with: Patient     Communicated BEE with patient/caregiver Date not available/Unable to determine     Discharge Plan A Home Health     DME Needed Upon Discharge  none     Transition of Care Barriers None     Why the patient remains in the hospital Requires continued medical care        Post-Acute Status    Post-Acute Authorization Home Health     Home Health Status Pending medical clearance/testing     Discharge Delays None known at this time

## 2023-05-19 NOTE — PLAN OF CARE
Patient in no apparent distress. Sat's 98  % on 3 lpm, decreased to 1 lpm, however sat's decreased to 86%. Increased to 2 lpm, sat's 91%. Aerosol treatments given Q 6 . Some wheezing present.  Will continue to monitor.

## 2023-05-19 NOTE — PT/OT/SLP EVAL
Patient in bed with HOB elevated upon PT arrival to evaluate. Patient reporting right foot pain rated 9/10 since try to get up to use the bathroom this morning. She describes it as constant throbbing pain on the bottom of her foot. She reports she has had the same symptoms before and was told she had cellulitis. She is unable to participate in PT evaluation at this time due to pain and inability to WB, RN in room giving her ibuprofen and lasix. PT to follow and evaluate when patient is able to participate.

## 2023-05-19 NOTE — PLAN OF CARE
Problem: Adult Inpatient Plan of Care  Goal: Plan of Care Review  Outcome: Ongoing, Progressing     Problem: Adult Inpatient Plan of Care  Goal: Optimal Comfort and Wellbeing  Outcome: Ongoing, Progressing     Problem: Skin Injury Risk Increased  Goal: Skin Health and Integrity  Outcome: Ongoing, Progressing     Problem: Adjustment to Illness (Heart Failure)  Goal: Optimal Coping  Outcome: Ongoing, Progressing     Problem: Cardiac Output Decreased (Heart Failure)  Goal: Optimal Cardiac Output  Outcome: Ongoing, Progressing     Problem: Fluid Imbalance (Heart Failure)  Goal: Fluid Balance  Outcome: Ongoing, Progressing

## 2023-05-19 NOTE — PLAN OF CARE
Problem: Adult Inpatient Plan of Care  Goal: Plan of Care Review  Outcome: Ongoing, Progressing  Goal: Patient-Specific Goal (Individualized)  Outcome: Ongoing, Progressing  Goal: Absence of Hospital-Acquired Illness or Injury  Outcome: Ongoing, Progressing  Goal: Optimal Comfort and Wellbeing  Outcome: Ongoing, Progressing  Goal: Readiness for Transition of Care  Outcome: Ongoing, Progressing     Problem: Bariatric Environmental Safety  Goal: Safety Maintained with Care  Outcome: Ongoing, Progressing     Problem: Skin Injury Risk Increased  Goal: Skin Health and Integrity  Outcome: Ongoing, Progressing     Problem: Adjustment to Illness (Heart Failure)  Goal: Optimal Coping  Outcome: Ongoing, Progressing     Problem: Cardiac Output Decreased (Heart Failure)  Goal: Optimal Cardiac Output  Outcome: Ongoing, Progressing     Problem: Dysrhythmia (Heart Failure)  Goal: Stable Heart Rate and Rhythm  Outcome: Ongoing, Progressing     Problem: Fluid Imbalance (Heart Failure)  Goal: Fluid Balance  Outcome: Ongoing, Progressing     Problem: Functional Ability Impaired (Heart Failure)  Goal: Optimal Functional Ability  Outcome: Ongoing, Progressing     Problem: Oral Intake Inadequate (Heart Failure)  Goal: Optimal Nutrition Intake  Outcome: Ongoing, Progressing     Problem: Respiratory Compromise (Heart Failure)  Goal: Effective Oxygenation and Ventilation  Outcome: Ongoing, Progressing     Problem: Sleep Disordered Breathing (Heart Failure)  Goal: Effective Breathing Pattern During Sleep  Outcome: Ongoing, Progressing   POC reviewed at bedside. Questions and concerns addressed. VSS. Placed bed in low and locked position. Call light within reach. Side rails up x2. Instructed to call for any needs. Verbalized understanding of all instructions. Frequent rounds.

## 2023-05-19 NOTE — ASSESSMENT & PLAN NOTE
Troponin elevation in setting of volume overload and acute hypoxia  TTE completed  Trended to peak

## 2023-05-19 NOTE — ASSESSMENT & PLAN NOTE
Acute hypoxic respiratory failure 2/2 Acute on chronic HFrEF exacerbation  Received IV lasix x1 and did not return to baseline  Continue IV lasix for goal net negative 1.5-2L daily  TTE completed  Keep K>4, Mg>2  Daily weights  Strict I's and O's  Tele w/ pulse ox  Wean O2 as tolerated to maintain O2>88

## 2023-05-19 NOTE — PROGRESS NOTES
[facility] Medicine  Progress Note    Patient Name: Radha Alfred  MRN: [id number]  Patient Class: IP- Inpatient   Admission Date: 5/17/2023  Length of Stay: 0 days  Attending Physician: Yariel Keane MD  Primary Care Provider: FRANSISCA Rodriguez        Subjective:     Principal Problem:Acute on chronic heart failure        HPI:  Patient is 68 yo w/ hx of HF, COPD, Bipolar, Hypothyroidism presenting with acute worsening of SOB today while walking. Patient with 1 month hx of worsening SOB, Abdominal and LE swelling. She cannot lay flat. She is taking medications as prescribed. No fever, chills, cough, NVD. No chest pain.       Overview/Hospital Course:  No notes on file    Interval History: Continue IV lasix and Duonebs. Still requiring O2.     Review of Systems   All other systems reviewed and are negative.  Objective:     Vital Signs (Most Recent):  Temp: 98.6 °F (37 °C) (05/19/23 1056)  Pulse: 84 (05/19/23 1056)  Resp: (!) 22 (05/19/23 1056)  BP: 139/63 (05/19/23 1056)  SpO2: 95 % (05/19/23 1056) Vital Signs (24h Range):  Temp:  [97 °F (36.1 °C)-98.8 °F (37.1 °C)] 98.6 °F (37 °C)  Pulse:  [75-94] 84  Resp:  [12-22] 22  SpO2:  [86 %-98 %] 95 %  BP: (108-139)/(52-63) 139/63     Weight: 108.2 kg (238 lb 8.6 oz)  Body mass index is 43.63 kg/m².    Intake/Output Summary (Last 24 hours) at 5/19/2023 1135  Last data filed at 5/19/2023 0832  Gross per 24 hour   Intake 720 ml   Output 2702 ml   Net -1982 ml         Physical Exam      Vitals reviewed  General: NAD, Well developed, Well Nourished  Head: NC/AT  Eyes: EOMI, HONEY  Cardiovascular: Pulses intact distally, Regular Rate and rhythm  Pulmonary: Increased Respiratory Rate, No respiratory distress  Gi: Soft, Non-tender  Extremities: Warm, No edema present currently  Skin: Warm, dry  Neuro: Alert, Oriented x3, No focal Deficit  Psych: Appropriate mood and affect      Significant Labs: All pertinent labs within the past 24 hours have been  reviewed.    Significant Imaging: I have reviewed all pertinent imaging results/findings within the past 24 hours.      Assessment/Plan:      * Acute on chronic heart failure  Acute hypoxic respiratory failure 2/2 Acute on chronic HFrEF exacerbation  Received IV lasix x1 and did not return to baseline  Continue IV lasix for goal net negative 1.5-2L daily  TTE completed  Keep K>4, Mg>2  Daily weights  Strict I's and O's  Tele w/ pulse ox  Wean O2 as tolerated to maintain O2>88      Troponin level elevated  Troponin elevation in setting of volume overload and acute hypoxia  TTE completed  Trended to peak      Hypothyroidism  Continue synthroid      Bipolar 1 disorder  Continue risperidone      COPD (chronic obstructive pulmonary disease)  Not in acute exacerbation  Schedule treatments today        VTE Risk Mitigation (From admission, onward)         Ordered     enoxaparin injection 40 mg  Daily         05/17/23 1308     IP VTE HIGH RISK PATIENT  Once         05/17/23 1308     Place sequential compression device  Until discontinued         05/17/23 1308                Discharge Planning   BEE:      Code Status: Full Code   Is the patient medically ready for discharge?:     Reason for patient still in hospital (select all that apply): Treatment  Discharge Plan A: Home Health   Discharge Delays: None known at this time              Yariel Keane MD  Department of Hospital Medicine   [facility] Intensive [facility] [place]

## 2023-05-20 PROBLEM — D64.9 ACUTE ON CHRONIC ANEMIA: Status: ACTIVE | Noted: 2023-01-01

## 2023-05-20 PROBLEM — M79.671 RIGHT FOOT PAIN: Status: ACTIVE | Noted: 2023-01-01

## 2023-05-20 NOTE — NURSING
"At bedside per primary RN's request. Simi, RT and Cisco, RN also at bedside. Pt states, "I don't wear oxygen at home. I've never had to wear 4 liters. Sometimes 2 or 3 but never 4!" Pt's SpO2 75-86% on 2.5 liters nasal cannula. Increased to 4L per MD request to keep O2 sats >90%. Dr. Case notified and saw pt via tele-med camera. Pt states, "Y'all are trying to kill me." Informed pt that illness increases the need for oxygen. Pt states, "I'm not sick! I'm well!"  "

## 2023-05-20 NOTE — NURSING
"Informed Dr. Case of ABG results.       Pt self removed O2. States, "Get this off of me. I'm not wearing this on 4L." Dr. Case notified.   "

## 2023-05-20 NOTE — NURSING
"Pt is refusing to wear O2 Nasal Cannula. O2 Saturation is 75%. Respiratory at bedside. Pt states, "I'm not wearing that thing because my nose is bleeding, and I got dry boogers in my nose." Pt was instructed on the importance of O2 therapy. Respiratory reinforcing patient teaching.  "

## 2023-05-20 NOTE — PLAN OF CARE
Patient in no apparent distress. Patient on 2 lpm nasal cannula. Aerosol treatments given Q 6. Sputum obtained and sent to lab. . Will continue to monitor.

## 2023-05-20 NOTE — ASSESSMENT & PLAN NOTE
Will do CT scan of the right ankle and hind foot to rule out fracture  Podiatry consult  Tylenol for pain

## 2023-05-20 NOTE — ASSESSMENT & PLAN NOTE
Not in acute exacerbation  Schedule treatments today  She may have a touch of bronchitis  Will do sputum C&S started on Rocephin

## 2023-05-20 NOTE — NURSING
"Pt has been refusing to wear O2 as she "doesn't wear oxygen at home." O2 saturation was 84-87% on O2 2.5L NC. MD notified. MD ordered to increase O2 until patient's saturation is above 90% then collect an ABG. O2 was increased to 4L and O2 increased 94%. Respiratory notified to collect ABG and when at bedside, patient began yelling at staff regarding her oxygen therapy. Dr. Case notified and speaking with patient via Telemed.  "

## 2023-05-20 NOTE — SUBJECTIVE & OBJECTIVE
Interval History:  She is still short of breath and has a cough productive of yellow sputum.  She stated that she used home oxygen in the past and did not like the big tank but I informed the patient that her body needs supplemental oxygen.  She is also having some pain in her right foot and x-rays yesterday indicated a possible fracture in the ankle area.    Review of Systems   Constitutional: Negative.    HENT: Negative.     Eyes: Negative.    Respiratory:  Positive for cough (productive of yellow sputum) and shortness of breath.    Cardiovascular: Negative.    Gastrointestinal: Negative.    Endocrine: Negative.    Genitourinary: Negative.    Musculoskeletal:  Negative for arthralgias, back pain, joint swelling, myalgias and neck pain.        Right ankle pain   Skin: Negative.    Allergic/Immunologic: Negative.    Neurological: Negative.    Hematological: Negative.    Psychiatric/Behavioral: Negative.     Objective:     Vital Signs (Most Recent):  Temp: 98.1 °F (36.7 °C) (05/20/23 0905)  Pulse: 90 (05/20/23 0905)  Resp: 18 (05/20/23 0905)  BP: (!) 126/58 (05/20/23 0905)  SpO2: 96 % (05/20/23 0905)   Vital Signs (24h Range):  Temp:  [98 °F (36.7 °C)-99.1 °F (37.3 °C)] 98.1 °F (36.7 °C)  Pulse:  [] 90  Resp:  [18-23] 18  SpO2:  [76 %-96 %] 96 %  BP: (119-139)/(51-63) 126/58     Weight: 108.2 kg (238 lb 8.6 oz)  Body mass index is 43.63 kg/m².    Intake/Output Summary (Last 24 hours) at 5/20/2023 1047  Last data filed at 5/20/2023 0602  Gross per 24 hour   Intake --   Output 1600 ml   Net -1600 ml         Physical Exam  Vitals and nursing note reviewed.   Constitutional:       Appearance: She is obese.   HENT:      Head: Normocephalic and atraumatic.      Nose: Nose normal.      Mouth/Throat:      Mouth: Mucous membranes are moist.   Eyes:      Extraocular Movements: Extraocular movements intact.   Cardiovascular:      Rate and Rhythm: Normal rate and regular rhythm.      Pulses: Normal pulses.      Heart  sounds: Normal heart sounds.   Pulmonary:      Effort: Pulmonary effort is normal.      Breath sounds: Rhonchi present.   Abdominal:      General: Bowel sounds are normal.      Palpations: Abdomen is soft.      Comments: Obese   Musculoskeletal:         General: Tenderness (right hindfoot) present.      Cervical back: Normal range of motion and neck supple.   Skin:     General: Skin is warm and dry.   Neurological:      General: No focal deficit present.      Mental Status: She is alert and oriented to person, place, and time.   Psychiatric:         Mood and Affect: Mood normal.         Behavior: Behavior normal.   Narrative & Impression    EXAMINATION:  XR FOOT 2 VIEW RIGHT     CLINICAL HISTORY:  foot pain;     TECHNIQUE:  Two views of the right foot were acquired     COMPARISON:  None     FINDINGS:  The bones are osteopenic.  There is Achilles insertion calcaneal enthesophyte formation.  There is a plantar calcaneal spur.  There is talar beaking.  There is degenerative change of the naviculocuneiform joint.  There are degenerative changes of the TMT joints and interphalangeal joints of the right forefoot.  There is a possible exostosis noted along the lateral margin of the calcaneus on the AP radiograph which shows an associated possible age-indeterminate fracture.  Please correlate clinically for a history of recent trauma and focal tenderness over the lateral aspect of the hindfoot as deemed clinically appropriate.  Additional evaluation with CT of the ankle/hindfoot could be considered as deemed clinically appropriate.     Impression:     As above        Electronically signed by: Jesus Hollins MD  Date:                                            05/19/2023  Time:                           Significant Labs: All pertinent labs within the past 24 hours have been reviewed.    Significant Imaging: I have reviewed all pertinent imaging results/findings within the past 24 hours.

## 2023-05-20 NOTE — PROGRESS NOTES
[facility] Medicine  Progress Note    Patient Name: Radha Alfred  MRN: [id number]  Patient Class: IP- Inpatient   Admission Date: 5/17/2023  Length of Stay: 1 days  Attending Physician: Yariel Keane MD  Primary Care Provider: FRANSISCA Rodriguez        Subjective:     Principal Problem:Acute on chronic heart failure        HPI:  Patient is 68 yo w/ hx of HF, COPD, Bipolar, Hypothyroidism presenting with acute worsening of SOB today while walking. Patient with 1 month hx of worsening SOB, Abdominal and LE swelling. She cannot lay flat. She is taking medications as prescribed. No fever, chills, cough, NVD. No chest pain.       Overview/Hospital Course:  No notes on file    Interval History:  She is still short of breath and has a cough productive of yellow sputum.  She stated that she used home oxygen in the past and did not like the big tank but I informed the patient that her body needs supplemental oxygen.  She is also having some pain in her right foot and x-rays yesterday indicated a possible fracture in the ankle area.    Review of Systems   Constitutional: Negative.    HENT: Negative.     Eyes: Negative.    Respiratory:  Positive for cough (productive of yellow sputum) and shortness of breath.    Cardiovascular: Negative.    Gastrointestinal: Negative.    Endocrine: Negative.    Genitourinary: Negative.    Musculoskeletal:  Negative for arthralgias, back pain, joint swelling, myalgias and neck pain.        Right ankle pain   Skin: Negative.    Allergic/Immunologic: Negative.    Neurological: Negative.    Hematological: Negative.    Psychiatric/Behavioral: Negative.     Objective:     Vital Signs (Most Recent):  Temp: 98.1 °F (36.7 °C) (05/20/23 0905)  Pulse: 90 (05/20/23 0905)  Resp: 18 (05/20/23 0905)  BP: (!) 126/58 (05/20/23 0905)  SpO2: 96 % (05/20/23 0905)   Vital Signs (24h Range):  Temp:  [98 °F (36.7 °C)-99.1 °F (37.3 °C)] 98.1 °F (36.7 °C)  Pulse:  [] 90  Resp:   [18-23] 18  SpO2:  [76 %-96 %] 96 %  BP: (119-139)/(51-63) 126/58     Weight: 108.2 kg (238 lb 8.6 oz)  Body mass index is 43.63 kg/m².    Intake/Output Summary (Last 24 hours) at 5/20/2023 1047  Last data filed at 5/20/2023 0602  Gross per 24 hour   Intake --   Output 1600 ml   Net -1600 ml         Physical Exam  Vitals and nursing note reviewed.   Constitutional:       Appearance: She is obese.   HENT:      Head: Normocephalic and atraumatic.      Nose: Nose normal.      Mouth/Throat:      Mouth: Mucous membranes are moist.   Eyes:      Extraocular Movements: Extraocular movements intact.   Cardiovascular:      Rate and Rhythm: Normal rate and regular rhythm.      Pulses: Normal pulses.      Heart sounds: Normal heart sounds.   Pulmonary:      Effort: Pulmonary effort is normal.      Breath sounds: Rhonchi present.   Abdominal:      General: Bowel sounds are normal.      Palpations: Abdomen is soft.      Comments: Obese   Musculoskeletal:         General: Tenderness (right hindfoot) present.      Cervical back: Normal range of motion and neck supple.   Skin:     General: Skin is warm and dry.   Neurological:      General: No focal deficit present.      Mental Status: She is alert and oriented to person, place, and time.   Psychiatric:         Mood and Affect: Mood normal.         Behavior: Behavior normal.   Narrative & Impression    EXAMINATION:  XR FOOT 2 VIEW RIGHT     CLINICAL HISTORY:  foot pain;     TECHNIQUE:  Two views of the right foot were acquired     COMPARISON:  None     FINDINGS:  The bones are osteopenic.  There is Achilles insertion calcaneal enthesophyte formation.  There is a plantar calcaneal spur.  There is talar beaking.  There is degenerative change of the naviculocuneiform joint.  There are degenerative changes of the TMT joints and interphalangeal joints of the right forefoot.  There is a possible exostosis noted along the lateral margin of the calcaneus on the AP radiograph which shows an  associated possible age-indeterminate fracture.  Please correlate clinically for a history of recent trauma and focal tenderness over the lateral aspect of the hindfoot as deemed clinically appropriate.  Additional evaluation with CT of the ankle/hindfoot could be considered as deemed clinically appropriate.     Impression:     As above        Electronically signed by: Jesus Hollins MD  Date:                                            05/19/2023  Time:                           Significant Labs: All pertinent labs within the past 24 hours have been reviewed.    Significant Imaging: I have reviewed all pertinent imaging results/findings within the past 24 hours.      Assessment/Plan:      * Acute on chronic heart failure  Acute hypoxic respiratory failure 2/2 Acute on chronic HFrEF exacerbation  Received IV lasix x1 and did not return to baseline  Continue IV lasix for goal net negative 1.5-2L daily  TTE completed  Keep K>4, Mg>2  Daily weights  Strict I's and O's  Tele w/ pulse ox  Wean O2 as tolerated to maintain O2>88      Troponin level elevated  Troponin elevation in setting of volume overload and acute hypoxia  TTE completed  Trended to peak      Hypothyroidism  Continue synthroid      Bipolar 1 disorder  Continue risperidone      COPD (chronic obstructive pulmonary disease)  Not in acute exacerbation  Schedule treatments today  She may have a touch of bronchitis  Will do sputum C&S started on Rocephin        VTE Risk Mitigation (From admission, onward)         Ordered     enoxaparin injection 40 mg  Daily         05/17/23 1308     IP VTE HIGH RISK PATIENT  Once         05/17/23 1308     Place sequential compression device  Until discontinued         05/17/23 1308                Discharge Planning   BEE:      Code Status: Full Code   Is the patient medically ready for discharge?:     Reason for patient still in hospital (select all that apply): Laboratory test and Consult recommendations  Discharge Plan A: Home  Health   Discharge Delays: None known at this time              Nando Vicente MD  Department of Hospital Medicine   [place] [facility]

## 2023-05-20 NOTE — RESPIRATORY THERAPY
RT entered Pt's room to perform a Blood Gas, as RT entered room Pt. Had 02 off with a Sat of 77, Pt was upset that she was blowing blood from her nose due to the oxygen .  There was a bubble humidifier hooked to her nasal cannula for moisture .  RT tried to explain that this happens sometimes which aggitated her more.  She was upset and very angry.  She wouldn't allow RT to perform the Blood gas.  Accused all of us of forcing her .  RT explained to her that she was not going to force her to do anything she does not want to do. She did allow the blood gas but still would not put her oxygen on, in fact was very rude and hateful to Dr. Hurtado.  Refused to do anything to help increase her oxygenation.  Theresa JOHN

## 2023-05-20 NOTE — NURSING
"Pt c/o R foot pain.  Noted x-ray from yesterday. Pt states all she did was step out of bed "a few days ago."  Notified MD.   "

## 2023-05-21 PROBLEM — M72.2 PLANTAR FASCIITIS: Status: ACTIVE | Noted: 2023-01-01

## 2023-05-21 PROBLEM — M19.079: Status: ACTIVE | Noted: 2023-01-01

## 2023-05-21 PROBLEM — M76.70 PERONEAL TENDONITIS, UNSPECIFIED LATERALITY: Status: ACTIVE | Noted: 2023-01-01

## 2023-05-21 NOTE — CARE UPDATE
05/21/23 1300   PRE-TX-O2   Device (Oxygen Therapy) room air   SpO2 (!) 77 %   Pulse 100   Resp 20   Home Oxygen Qualification   $ Home O2 Qualification Tech time 15 minutes   Room Air SpO2 At Rest (!) 77 %   SpO2 During Ambulation on O2 (!) 88 %  (up to 90 on 2.5 lpm)   Heart Rate on O2 101 bpm   Ambulation O2 LPM 2 LPM  (increased to 2.5)   SpO2 Post Ambulation 93 %   Post Ambulation Heart Rate 101 bpm   Post Ambulation O2 LPM 2.5 LPM   Home O2 Eval Comments Patient unable to walk. Patient moved up in bed and then dangled legs on side of bed while holding onto walker.

## 2023-05-21 NOTE — PROGRESS NOTES
[facility] Medicine  Progress Note    Patient Name: Radha Alfred  MRN: [id number]  Patient Class: IP- Inpatient   Admission Date: 5/17/2023  Length of Stay: 2 days  Attending Physician: Yariel Keane MD  Primary Care Provider: FRANSISCA Rodriguez        Subjective:     Principal Problem:Acute on chronic heart failure        HPI:  Patient is 66 yo w/ hx of HF, COPD, Bipolar, Hypothyroidism presenting with acute worsening of SOB today while walking. Patient with 1 month hx of worsening SOB, Abdominal and LE swelling. She cannot lay flat. She is taking medications as prescribed. No fever, chills, cough, NVD. No chest pain.       Overview/Hospital Course:  No notes on file    Interval History:  Had fever last night with with temperature of 101.7° .  Fever resolved spontaneously without any treatment denies cough, chills, nausea, abdominal pain or chest pain.  She is having pain in her feet.  I informed patient that CT scan did not show any fractures of right foot.    Review of Systems   Constitutional:  Positive for fever. Negative for chills and diaphoresis.   HENT: Negative.     Eyes: Negative.    Respiratory: Negative.     Cardiovascular: Negative.    Gastrointestinal: Negative.    Endocrine: Negative.    Genitourinary: Negative.    Musculoskeletal:  Positive for arthralgias.   Skin: Negative.    Allergic/Immunologic: Negative.    Neurological: Negative.    Hematological: Negative.    Psychiatric/Behavioral: Negative.     Objective:     Vital Signs (Most Recent):  Temp: 98.5 °F (36.9 °C) (05/21/23 1136)  Pulse: 89 (05/21/23 1136)  Resp: 19 (05/21/23 1136)  BP: (!) 125/56 (05/21/23 1136)  SpO2: (!) 93 % (05/21/23 1136) Vital Signs (24h Range):  Temp:  [98.4 °F (36.9 °C)-101.7 °F (38.7 °C)] 98.5 °F (36.9 °C)  Pulse:  [] 89  Resp:  [17-20] 19  SpO2:  [88 %-100 %] 93 %  BP: (100-126)/(48-58) 125/56     Weight: 108.2 kg (238 lb 8.6 oz)  Body mass index is 43.63 kg/m².    Intake/Output  Summary (Last 24 hours) at 5/21/2023 1219  Last data filed at 5/21/2023 0055  Gross per 24 hour   Intake 800 ml   Output 850 ml   Net -50 ml         Physical Exam  Vitals and nursing note reviewed.   Constitutional:       Appearance: She is obese.   HENT:      Head: Normocephalic and atraumatic.      Right Ear: External ear normal.      Left Ear: External ear normal.      Nose: Nose normal.      Mouth/Throat:      Mouth: Mucous membranes are moist.   Eyes:      Extraocular Movements: Extraocular movements intact.   Cardiovascular:      Rate and Rhythm: Normal rate. Rhythm irregular.      Pulses: Normal pulses.   Pulmonary:      Effort: Pulmonary effort is normal.      Breath sounds: Normal breath sounds.   Abdominal:      General: Abdomen is flat. Bowel sounds are normal.      Palpations: Abdomen is soft.   Musculoskeletal:         General: Normal range of motion.      Cervical back: Normal range of motion and neck supple.   Skin:     General: Skin is warm.      Capillary Refill: Capillary refill takes 2 to 3 seconds.   Neurological:      General: No focal deficit present.      Mental Status: She is alert and oriented to person, place, and time. Mental status is at baseline.   Psychiatric:         Mood and Affect: Mood normal.         Behavior: Behavior normal.           Significant Labs: All pertinent labs within the past 24 hours have been reviewed.    Significant Imaging: I have reviewed all pertinent imaging results/findings within the past 24 hours.      Assessment/Plan:      * Acute on chronic heart failure  Acute hypoxic respiratory failure 2/2 Acute on chronic HFrEF exacerbation  Received IV lasix x1 and did not return to baseline  Continue IV lasix for goal net negative 1.5-2L daily  TTE completed  Keep K>4, Mg>2  Daily weights  Strict I's and O's  Tele w/ pulse ox  Wean O2 as tolerated to maintain O2>88    Patient with probably need home O2      DJD (degenerative joint disease), ankle and foot, unspecified  laterality        Plantar fasciitis  Podiatry consult      Peroneal tendonitis, unspecified laterality        Acute on chronic anemia  Monitor hemoglobin hematocrit  Look for signs of acute blood loss      Right foot pain  Will do CT scan of the right ankle and hind foot to rule out fracture  Podiatry consult  Tylenol for pain  CT scan negative for fracture.  Right foot pain may be secondary to bone spur.    Podiatry consult pending      Troponin level elevated  Troponin elevation in setting of volume overload and acute hypoxia  TTE completed  Trended to peak      Hypothyroidism  Continue synthroid      Bipolar 1 disorder  Continue risperidone      COPD (chronic obstructive pulmonary disease)  Not in acute exacerbation  Schedule treatments today  She may have a touch of bronchitis  Will do sputum C&S started on Rocephin        VTE Risk Mitigation (From admission, onward)         Ordered     enoxaparin injection 40 mg  Daily         05/17/23 1308     IP VTE HIGH RISK PATIENT  Once         05/17/23 1308     Place sequential compression device  Until discontinued         05/17/23 1308                Discharge Planning   BEE:      Code Status: Full Code   Is the patient medically ready for discharge?:     Reason for patient still in hospital (select all that apply): Treatment and Consult recommendations  Discharge Plan A: Home Health   Discharge Delays: None known at this time              Nando Vicente MD  Department of Hospital Medicine   [facility]ensive [facility]

## 2023-05-21 NOTE — SUBJECTIVE & OBJECTIVE
Interval History:  Had fever last night with with temperature of 101.7° .  Fever resolved spontaneously without any treatment denies cough, chills, nausea, abdominal pain or chest pain.  She is having pain in her feet.  I informed patient that CT scan did not show any fractures of right foot.    Review of Systems   Constitutional:  Positive for fever. Negative for chills and diaphoresis.   HENT: Negative.     Eyes: Negative.    Respiratory: Negative.     Cardiovascular: Negative.    Gastrointestinal: Negative.    Endocrine: Negative.    Genitourinary: Negative.    Musculoskeletal:  Positive for arthralgias.   Skin: Negative.    Allergic/Immunologic: Negative.    Neurological: Negative.    Hematological: Negative.    Psychiatric/Behavioral: Negative.     Objective:     Vital Signs (Most Recent):  Temp: 98.5 °F (36.9 °C) (05/21/23 1136)  Pulse: 89 (05/21/23 1136)  Resp: 19 (05/21/23 1136)  BP: (!) 125/56 (05/21/23 1136)  SpO2: (!) 93 % (05/21/23 1136) Vital Signs (24h Range):  Temp:  [98.4 °F (36.9 °C)-101.7 °F (38.7 °C)] 98.5 °F (36.9 °C)  Pulse:  [] 89  Resp:  [17-20] 19  SpO2:  [88 %-100 %] 93 %  BP: (100-126)/(48-58) 125/56     Weight: 108.2 kg (238 lb 8.6 oz)  Body mass index is 43.63 kg/m².    Intake/Output Summary (Last 24 hours) at 5/21/2023 1219  Last data filed at 5/21/2023 0055  Gross per 24 hour   Intake 800 ml   Output 850 ml   Net -50 ml         Physical Exam  Vitals and nursing note reviewed.   Constitutional:       Appearance: She is obese.   HENT:      Head: Normocephalic and atraumatic.      Right Ear: External ear normal.      Left Ear: External ear normal.      Nose: Nose normal.      Mouth/Throat:      Mouth: Mucous membranes are moist.   Eyes:      Extraocular Movements: Extraocular movements intact.   Cardiovascular:      Rate and Rhythm: Normal rate. Rhythm irregular.      Pulses: Normal pulses.   Pulmonary:      Effort: Pulmonary effort is normal.      Breath sounds: Normal breath  sounds.   Abdominal:      General: Abdomen is flat. Bowel sounds are normal.      Palpations: Abdomen is soft.   Musculoskeletal:         General: Normal range of motion.      Cervical back: Normal range of motion and neck supple.   Skin:     General: Skin is warm.      Capillary Refill: Capillary refill takes 2 to 3 seconds.   Neurological:      General: No focal deficit present.      Mental Status: She is alert and oriented to person, place, and time. Mental status is at baseline.   Psychiatric:         Mood and Affect: Mood normal.         Behavior: Behavior normal.           Significant Labs: All pertinent labs within the past 24 hours have been reviewed.    Significant Imaging: I have reviewed all pertinent imaging results/findings within the past 24 hours.

## 2023-05-21 NOTE — ASSESSMENT & PLAN NOTE
Will do CT scan of the right ankle and hind foot to rule out fracture  Podiatry consult  Tylenol for pain  CT scan negative for fracture.  Right foot pain may be secondary to bone spur.    Podiatry consult pending

## 2023-05-21 NOTE — ASSESSMENT & PLAN NOTE
Acute hypoxic respiratory failure 2/2 Acute on chronic HFrEF exacerbation  Received IV lasix x1 and did not return to baseline  Continue IV lasix for goal net negative 1.5-2L daily  TTE completed  Keep K>4, Mg>2  Daily weights  Strict I's and O's  Tele w/ pulse ox  Wean O2 as tolerated to maintain O2>88    Patient with probably need home O2

## 2023-05-21 NOTE — PLAN OF CARE
Problem: Adult Inpatient Plan of Care  Goal: Plan of Care Review  Outcome: Ongoing, Progressing  Goal: Patient-Specific Goal (Individualized)  Outcome: Ongoing, Progressing  Goal: Absence of Hospital-Acquired Illness or Injury  Outcome: Ongoing, Progressing  Goal: Optimal Comfort and Wellbeing  Outcome: Ongoing, Progressing  Goal: Readiness for Transition of Care  Outcome: Ongoing, Progressing     Problem: Bariatric Environmental Safety  Goal: Safety Maintained with Care  Outcome: Ongoing, Progressing     Problem: Skin Injury Risk Increased  Goal: Skin Health and Integrity  Outcome: Ongoing, Progressing     Problem: Adjustment to Illness (Heart Failure)  Goal: Optimal Coping  Outcome: Ongoing, Progressing     Problem: Cardiac Output Decreased (Heart Failure)  Goal: Optimal Cardiac Output  Outcome: Ongoing, Progressing

## 2023-05-21 NOTE — CONSULTS
"Consults  [facility] Intensive Care  Podiatry  H&P    Patient Name: Radha Alfred  MRN: [id number]  Admission Date: 5/17/2023  Attending Physician: Yariel Keane MD  Primary Care Provider: FRANSISCA Rodriguez     Subjective:     History of Present Illness: Patient presents for an initial inpatient consultation due to severe right foot pain.  Patient states she is not had any problems with her right foot up until about 2 days ago she states she went to get out of bed to use the bathroom and experience severe pain in her right foot as of 2 more this morning her left foot has started to be painful.    Scheduled Meds:   albuterol-ipratropium  3 mL Nebulization Q6H    atorvastatin  10 mg Oral QHS    cefTRIAXone (ROCEPHIN) IVPB  1 g Intravenous Q24H    enoxparin  40 mg Subcutaneous Daily    furosemide (LASIX) injection  60 mg Intravenous Q12H    levothyroxine  100 mcg Oral Before breakfast    mupirocin   Nasal BID    polyethylene glycol  17 g Oral Daily    risperiDONE  2 mg Oral QHS    spironolactone  25 mg Oral Daily     Continuous Infusions:  PRN Meds:albuterol, dextrose 50%, dextrose 50%, glucagon (human recombinant), glucose, glucose, ibuprofen, naloxone, senna-docusate 8.6-50 mg, sodium chloride 0.9%    Review of patient's allergies indicates:   Allergen Reactions    Cortisone      Other reaction(s): "breaks me out"    Iodine Hives    Iodine and iodide containing products         Past Medical History:   Diagnosis Date    Anxiety     Bipolar 1 disorder     Breast cancer     CHF (congestive heart failure)     COPD (chronic obstructive pulmonary disease)     Depression     Diabetes mellitus     Hypertension     PVD (peripheral vascular disease)     Schizophrenia     Thyroid disease      Past Surgical History:   Procedure Laterality Date    BREAST LUMPECTOMY      TONSILLECTOMY         Family History       Problem Relation (Age of Onset)    Lung cancer Mother          Tobacco Use    Smoking status: Former     Types: " Cigarettes     Passive exposure: Never    Smokeless tobacco: Never   Substance and Sexual Activity    Alcohol use: Not Currently    Drug use: Never    Sexual activity: Not Currently     Birth control/protection: Post-menopausal     Review of Systems   Musculoskeletal:  Positive for arthralgias, gait problem and joint swelling.   All other systems reviewed and are negative.  Objective:     Vital Signs (Most Recent):  Temp: 98.4 °F (36.9 °C) (05/21/23 0837)  Pulse: 86 (05/21/23 0837)  Resp: 18 (05/21/23 0755)  BP: (!) 118/56 (05/21/23 0837)  SpO2: (!) 93 % (05/21/23 0837) Vital Signs (24h Range):  Temp:  [98.1 °F (36.7 °C)-101.7 °F (38.7 °C)] 98.4 °F (36.9 °C)  Pulse:  [] 86  Resp:  [17-20] 18  SpO2:  [88 %-100 %] 93 %  BP: (100-126)/(48-58) 118/56     Weight: 108.2 kg (238 lb 8.6 oz)  Body mass index is 43.63 kg/m².    Foot Exam    Right Foot/Ankle     Inspection and Palpation  Skin Exam: dry skin and erythema;     Neurovascular  Dorsalis pedis: 2+  Posterior tibial: 1+      Left Foot/Ankle      Inspection and Palpation  Skin Exam: dry skin and erythema;     Neurovascular  Dorsalis pedis: 2+  Posterior tibial: 1+      Physical Exam  Vitals and nursing note reviewed.   Constitutional:       Appearance: She is ill-appearing.   Cardiovascular:      Pulses:           Dorsalis pedis pulses are 2+ on the right side and 2+ on the left side.        Posterior tibial pulses are 1+ on the right side and 1+ on the left side.   Pulmonary:      Effort: Respiratory distress present.   Musculoskeletal:         General: Swelling, tenderness and deformity present.      Right lower leg: Edema present.      Right foot: Decreased range of motion. Deformity present.      Left foot: Decreased range of motion. Deformity present.        Feet:    Feet:      Right foot:      Protective Sensation: 2 sites tested.  2 sites sensed.      Skin integrity: Erythema, warmth and dry skin present.      Left foot:      Protective Sensation: 2 sites  tested.  2 sites sensed.      Skin integrity: Erythema and dry skin present.   Skin:     Capillary Refill: Capillary refill takes 2 to 3 seconds.      Findings: Erythema present.   Neurological:      General: No focal deficit present.      Mental Status: She is alert.   Psychiatric:         Mood and Affect: Mood normal.         Behavior: Behavior normal.         Laboratory:  All pertinent labs reviewed within the last 24 hours.    Diagnostic Results:  I have reviewed all pertinent imaging results/findings within the past 24 hours.    Clinical Findings:  X-Ray Foot 2 View Right  Order: [id number]  Status: Final result     Visible to patient: No (inaccessible in Patient Portal)     Next appt: None     0 Result Notes  Details    Reading Physician Reading Date Result Priority   Nando Hollins MD  [phone] 5/19/2023 Routine     Narrative & Impression  EXAMINATION:  XR FOOT 2 VIEW RIGHT     CLINICAL HISTORY:  foot pain;     TECHNIQUE:  Two views of the right foot were acquired     COMPARISON:  None     FINDINGS:  The bones are osteopenic.  There is Achilles insertion calcaneal enthesophyte formation.  There is a plantar calcaneal spur.  There is talar beaking.  There is degenerative change of the naviculocuneiform joint.  There are degenerative changes of the TMT joints and interphalangeal joints of the right forefoot.  There is a possible exostosis noted along the lateral margin of the calcaneus on the AP radiograph which shows an associated possible age-indeterminate fracture.  Please correlate clinically for a history of recent trauma and focal tenderness over the lateral aspect of the hindfoot as deemed clinically appropriate.  Additional evaluation with CT of the ankle/hindfoot could be considered as deemed clinically appropriate.     Impression:     As above        Electronically signed by: Jesus Hollins MD  Date:                                            05/19/2023  Time:                                            17:18     CT Ankle (Including Hindfoot) Without Contrast Right  Order: [id number]  Status: Final result     Visible to patient: No (inaccessible in Patient Portal)     Next appt: None     0 Result Notes  Details    Reading Physician Reading Date Result Priority   Nando Hollins MD  [phone] 5/20/2023 Routine     Narrative & Impression  EXAMINATION:  CT ANKLE (INCLUDING HINDFOOT) WITHOUT CONTRAST RIGHT     CLINICAL HISTORY:  Fracture, ankle;Ankle pain, stress fracture suspected, neg xray;     TECHNIQUE:  2 mm non-contrast axial images were acquired through the ankle/hindfoot midfoot, and forefoot to the level of the MTP joints.  Subsequently, coronal and sagittal reconstructed images were generated from the source data.     COMPARISON:  None     FINDINGS:  There is Achilles insertion calcaneal enthesophyte formation and plantar calcaneal spur formation.  No radiographically evident acute, displaced fracture or osseous destructive process noted about the imaged right ankle/foot.  There is a 9 x 10 mm osteochondral lesion versus subcortical cyst/geode present in the far medial talar dome on series 5 image 50-series 6, image the 48-50.  No syndesmotic widening or incongruity.  There is advanced degenerative osteoarthritis of the calcaneocuboid joint, 2nd-5th TMT joints, and naviculocuneiform joint with prominent subcortical cyst formation observed.  No definite erosions.  There is osteophyte formation observed at the peroneal tubercle of the calcaneus suggesting underlying peroneal tendon dysfunction.  There is thickening of the peroneus longus tendon at and distal to the lateral malleolus.  The tarsal tunnel is unremarkable.     Impression:     1. No acute fracture or osseous destructive process in this patient with a suspected stress fracture.  2. Degenerative change of the ankle/hindfoot and midfoot.  Additional details are provided above.  3. Secondary findings of peroneal tendon dysfunction.         Electronically signed by: Jesus Hollins MD  Date:                                            05/20/2023  Time:                                           12:17     Assessment/Plan:     Active Diagnoses:    Diagnosis Date Noted POA    PRINCIPAL PROBLEM:  Acute on chronic heart failure [I50.9] 05/17/2023 Yes    Peroneal tendonitis, unspecified laterality [M76.70] 05/21/2023 Yes    Plantar fasciitis [M72.2] 05/21/2023 Yes    DJD (degenerative joint disease), ankle and foot, unspecified laterality [M19.079] 05/21/2023 Yes    Right foot pain [M79.671] 05/20/2023 No    Acute on chronic anemia [D64.9] 05/20/2023 Yes    COPD (chronic obstructive pulmonary disease) [J44.9] 05/17/2023 Yes    Bipolar 1 disorder [F31.9] 05/17/2023 Yes    Hypothyroidism [E03.9] 05/17/2023 Yes    Troponin level elevated [R77.8] 05/17/2023 Yes      Problems Resolved During this Admission:       Patient presents for an initial inpatient consultation due to severe right foot pain.  Patient states she is not had any problems with her right foot up until about 2 days ago she states she went to get out of bed to use the bathroom and experience severe pain in her right foot as of 2 more this morning her left foot has started to be painful.  On evaluation patient does have significant inflammation involving the patient's right foot this is especially noted to be on the lateral and plantar lateral aspect of the right foot which does correlate with plain film x-ray and CT that were previously done the patient has significant exostosis presentation along the lateral margin of the calcaneus appreciated on the AP view of the x-ray this is along the course of the peroneal tendons.  Patient has findings consistent with peroneal tendinitis right she also has plantar fascial pain with notable pain upon direct palpation of the course of the plantar fascia from the insertion on the calcaneus to the plantar forefoot right.  Patient is now starting to have plantar  fascial pain also on the left foot while her left foot is not significantly swollen like the right it is painful on examination and palpation especially along the course of the plantar fascia patient is also having some degree of pain on the dorsal aspect of the left foot.  I have ordered plain film x-rays of the patient's left foot for further evaluation I did review x-rays and CT of the right foot displaying significant degenerative changes multiple areas of spur but the most significant noted at this time and correlation with clinical findings are the degenerative spurring changes noted at the lateral calcaneus right.  Patient has no skin breaks no signs of infection noted I did advised the patient it is very possible that her laying in the bed nonweightbearing and then suddenly getting out of bed to use the bathroom without any shoes on has contributed to both the peroneal tendinitis and the plantar fasciitis this is a very common finding when people are barefoot and not getting enough support clearly the right foot is significantly inflamed but I did explain to the patient the spurring on the lateral calcaneus is nothing that is new this is likely been here for an extended period of time.  I did order compression stockings with limited compression applied bilateral while the patient is in bed and I explained to the patient as well as provided orders to nursing patient is not to get out of bed whether it is to use the bathroom or move around her room without her shoes on it is important that she have shoes on when she is weight-bearing to give her some degree of support which will help both the plantar fascial pain and the peroneal tendinitis.  Patient indicated she is not sure how long she is going to be in the hospital she is currently admitted for CHF which she states she was diagnosed with 6 years ago and has been having bouts over the past 3 years.  I did explain to the patient this is typically something  that we see in the office on a regular basis besides the compression stockings wearing the shoes when she gets out of bed this is something that will need to be followed up in office I do not feel that putting the patient in a light weight fracture boot is going to be significantly helpful at this time I think she is better off just putting her shoes on with weight-bearing.  I did explain to the patient were going to start with these conservative treatments and I will evaluate the x-rays on the left foot proceeding with treatment pending x-ray results.  I do recommend follow-up with the patient upon discharge for re-evaluation we can also assess to ensure that the patient is getting appropriate support at all times.  Patient was in understanding and agreement with everything discussed I did review and discuss x-ray and CT with her in detail.  Patient does not display significant vascular compromise in the lower extremities.  Both plantar fasciitis and peroneal tendinitis as well as the patient's degenerative arthritic changes reviewed and discussed in detail today treatment plan discussed.  Will follow upon discharge.  Will review x-rays left.  Total time including discussion evaluation treatment discussion of treatment options treatment plan review of imaging studies including x-ray and CT review of the patient's chart and documentation of today's visit equaled 60 minutes.  Patient did not have the shoes that she wore to the hospital upon admission readily available in the room I advised her if she does not have something more than a slipper that she came to the hospital in she needs to have friend or family member bring her some shoes with appropriate support preferably an athletic shoe I do think that compression stockings were going to help push some of some of the swelling out of the right lower extremity this will also give her additional support upon weight-bearing in addition to wearing shoes.  This note was  created using [employer] voice recognition software that occasionally misinterpreted phrases or words.     Andrew Sloan DPM  Podiatry  [facility]

## 2023-05-21 NOTE — SIGNIFICANT EVENT
"Dx: New Fever of unknown etiology  I have ordered CXR, blood cxs and UA  COVID PCR  She is on Rocephin 1g iv q24 hours  Based on results of work up, may need to broaden this out.  Stat Procal and LA ordered    BP (!) 126/56 (BP Location: Right arm, Patient Position: Lying)   Pulse 104   Temp (!) 101.7 °F (38.7 °C) (Oral)   Resp 18   Ht 5' 2" (1.575 m)   Wt 108.2 kg (238 lb 8.6 oz)   SpO2 (!) 90%   Breastfeeding No   BMI 43.63 kg/m².    All blood cxs negative so far.      Current Facility-Administered Medications:     albuterol nebulizer solution 2.5 mg, 2.5 mg, Nebulization, Q8H PRN, Yariel Keane MD    albuterol-ipratropium 2.5 mg-0.5 mg/3 mL nebulizer solution 3 mL, 3 mL, Nebulization, Q6H, Yraiel Keane MD, 3 mL at 05/20/23 2002    atorvastatin tablet 10 mg, 10 mg, Oral, QHS, Yariel Keane MD, 10 mg at 05/20/23 2043    cefTRIAXone (ROCEPHIN) 1 g in dextrose 5 % in water (D5W) 5 % 50 mL IVPB (MB+), 1 g, Intravenous, Q24H, Nando Vicente MD, Stopped at 05/20/23 1034    dextrose 50% injection 12.5 g, 12.5 g, Intravenous, PRN, Yariel Keane MD    dextrose 50% injection 25 g, 25 g, Intravenous, PRN, Yariel Keane MD    enoxaparin injection 40 mg, 40 mg, Subcutaneous, Daily, Yariel Keane MD, 40 mg at 05/20/23 1716    furosemide injection 60 mg, 60 mg, Intravenous, Q12H, Yariel Keane MD, 60 mg at 05/20/23 2149    glucagon (human recombinant) injection 1 mg, 1 mg, Intramuscular, PRN, Yariel Keane MD    glucose chewable tablet 16 g, 16 g, Oral, PRN, Yariel Keane MD    glucose chewable tablet 24 g, 24 g, Oral, PRN, Yariel Keane MD    ibuprofen tablet 400 mg, 400 mg, Oral, Q6H PRN, Yariel Keane MD, 400 mg at 05/20/23 0858    levothyroxine tablet 100 mcg, 100 mcg, Oral, Before breakfast, Yariel Keane MD, 100 mcg at 05/20/23 0600    mupirocin 2 % ointment, , Nasal, BID, Yariel Keane MD, Given at 05/20/23 0900    " naloxone 0.4 mg/mL injection 0.02 mg, 0.02 mg, Intravenous, PRN, Yariel Keane MD    polyethylene glycol packet 17 g, 17 g, Oral, Daily, Yariel Keane MD, 17 g at 05/20/23 0900    risperiDONE tablet 2 mg, 2 mg, Oral, QHS, Yariel Keane MD, 2 mg at 05/20/23 2043    senna-docusate 8.6-50 mg per tablet 1 tablet, 1 tablet, Oral, Daily PRN, Yariel Keane MD, 1 tablet at 05/19/23 1625    sodium chloride 0.9% flush 10 mL, 10 mL, Intravenous, Q12H PRN, Yariel Keane MD      Recent Results (from the past 24 hour(s))   ISTAT PROCEDURE    Collection Time: 05/20/23 12:07 AM   Result Value Ref Range    POC PH 7.474 (H) 7.35 - 7.45    POC PCO2 55.1 (H) 35 - 45 mmHg    POC PO2 65 (L) 80 - 100 mmHg    POC HCO3 40.5 (H) 24 - 28 mmol/L    POC BE 17 -2 to 2 mmol/L    POC SATURATED O2 93 (L) 95 - 100 %    Sample ARTERIAL     Site RR     Allens Test Pass     DelSys Nasal Can     Mode SPONT     Flow 4     FiO2 36     Sp02 94    CBC Auto Differential    Collection Time: 05/20/23  5:22 AM   Result Value Ref Range    WBC 10.20 3.90 - 12.70 K/uL    RBC 4.29 4.00 - 5.40 M/uL    Hemoglobin 7.6 (L) 12.0 - 16.0 g/dL    Hematocrit 29.1 (L) 37.0 - 48.5 %    MCV 68 (L) 82 - 98 fL    MCH 17.7 (L) 27.0 - 31.0 pg    MCHC 26.1 (L) 32.0 - 36.0 g/dL    RDW 19.5 (H) 11.5 - 14.5 %    Platelets 266 150 - 450 K/uL    MPV 9.5 9.2 - 12.9 fL    Immature Granulocytes 0.4 0.0 - 0.5 %    Gran # (ANC) 7.0 1.8 - 7.7 K/uL    Immature Grans (Abs) 0.04 0.00 - 0.04 K/uL    Lymph # 2.0 1.0 - 4.8 K/uL    Mono # 0.9 0.3 - 1.0 K/uL    Eos # 0.3 0.0 - 0.5 K/uL    Baso # 0.05 0.00 - 0.20 K/uL    nRBC 0 0 /100 WBC    Gran % 68.5 38.0 - 73.0 %    Lymph % 19.7 18.0 - 48.0 %    Mono % 8.4 4.0 - 15.0 %    Eosinophil % 2.5 0.0 - 8.0 %    Basophil % 0.5 0.0 - 1.9 %    Differential Method Automated    Comprehensive Metabolic Panel    Collection Time: 05/20/23  5:22 AM   Result Value Ref Range    Sodium 140 136 - 145 mmol/L    Potassium 3.8 3.5 - 5.1  mmol/L    Chloride 95 95 - 110 mmol/L    CO2 34 (H) 23 - 29 mmol/L    Glucose 116 (H) 70 - 110 mg/dL    BUN 22 8 - 23 mg/dL    Creatinine 0.8 0.5 - 1.4 mg/dL    Calcium 9.0 8.7 - 10.5 mg/dL    Total Protein 6.3 6.0 - 8.4 g/dL    Albumin 3.1 (L) 3.5 - 5.2 g/dL    Total Bilirubin 1.5 (H) 0.1 - 1.0 mg/dL    Alkaline Phosphatase 72 55 - 135 U/L    AST 12 10 - 40 U/L    ALT 6 (L) 10 - 44 U/L    Anion Gap 11 8 - 16 mmol/L    eGFR >60.0 >60 mL/min/1.73 m^2   Magnesium    Collection Time: 05/20/23  5:22 AM   Result Value Ref Range    Magnesium 2.0 1.6 - 2.6 mg/dL

## 2023-05-21 NOTE — CARE UPDATE
Ms. Alfred developed a fever over night, which resolved of its own accord  Work up of LA, UA and CXR were unrevealing  Continue Rocephin for now  Follow up am CBC  If worsens, broaden to Cefepime and Vanco      Temp:  [99.6 °F (37.6 °C)-101.7 °F (38.7 °C)]   Pulse:  []   Resp:  [18-20]   BP: (115-126)/(56-57)   SpO2:  [88 %-94 %]      Recent Results (from the past 6 hour(s))   Lactic Acid, Plasma    Collection Time: 05/21/23 12:09 AM   Result Value Ref Range    Lactate (Lactic Acid) 0.7 0.5 - 2.2 mmol/L   Urinalysis, Reflex to Urine Culture Urine, Clean Catch    Collection Time: 05/21/23  4:08 AM    Specimen: Urine, Catheterized   Result Value Ref Range    Specimen UA Urine, Clean Catch     Color, UA Yellow Yellow, Straw, Saranya    Appearance, UA Clear Clear    pH, UA 7.0 5.0 - 8.0    Specific Gravity, UA 1.010 1.005 - 1.030    Protein, UA Negative Negative    Glucose, UA Negative Negative    Ketones, UA Negative Negative    Bilirubin (UA) Negative Negative    Occult Blood UA Negative Negative    Nitrite, UA Negative Negative    Urobilinogen, UA Negative Negative EU/dL    Leukocytes, UA Negative Negative         CXR - heart is enlarged dilation of pulmonary artery, unchanged, likely pulmonary HTN, interstitial edema.  No obvious infiltrates.

## 2023-05-21 NOTE — NURSING
"LPN stated patient refused her "heart medicine" of Spironolactone.  I educated patient on taking medication and it's relationship to Lasix and Potassium.  Pt continued to refuse the need to take the medication.  Medication returned to Pyxis.   "

## 2023-05-21 NOTE — PLAN OF CARE
Problem: Adult Inpatient Plan of Care  Goal: Plan of Care Review  Outcome: Ongoing, Progressing  Goal: Patient-Specific Goal (Individualized)  Outcome: Ongoing, Progressing  Goal: Absence of Hospital-Acquired Illness or Injury  Outcome: Ongoing, Progressing  Goal: Optimal Comfort and Wellbeing  Outcome: Ongoing, Progressing  Goal: Readiness for Transition of Care  Outcome: Ongoing, Progressing     Problem: Bariatric Environmental Safety  Goal: Safety Maintained with Care  Outcome: Ongoing, Progressing     Problem: Skin Injury Risk Increased  Goal: Skin Health and Integrity  Outcome: Ongoing, Progressing     Problem: Adjustment to Illness (Heart Failure)  Goal: Optimal Coping  Outcome: Ongoing, Progressing     Problem: Cardiac Output Decreased (Heart Failure)  Goal: Optimal Cardiac Output  Outcome: Ongoing, Progressing     Problem: Dysrhythmia (Heart Failure)  Goal: Stable Heart Rate and Rhythm  Outcome: Ongoing, Progressing     Problem: Fluid Imbalance (Heart Failure)  Goal: Fluid Balance  Outcome: Ongoing, Progressing     Problem: Functional Ability Impaired (Heart Failure)  Goal: Optimal Functional Ability  Outcome: Ongoing, Progressing     Problem: Oral Intake Inadequate (Heart Failure)  Goal: Optimal Nutrition Intake  Outcome: Ongoing, Progressing     Problem: Respiratory Compromise (Heart Failure)  Goal: Effective Oxygenation and Ventilation  Outcome: Ongoing, Progressing     Problem: Sleep Disordered Breathing (Heart Failure)  Goal: Effective Breathing Pattern During Sleep  Outcome: Ongoing, Progressing     Problem: Fall Injury Risk  Goal: Absence of Fall and Fall-Related Injury  Outcome: Ongoing, Progressing     Problem: Infection  Goal: Absence of Infection Signs and Symptoms  Outcome: Ongoing, Progressing

## 2023-05-21 NOTE — PLAN OF CARE
Problem: Adult Inpatient Plan of Care  Goal: Plan of Care Review  5/21/2023 0548 by Cisco Hylton RN  Outcome: Ongoing, Progressing  5/21/2023 0548 by Cisco Hylton RN  Outcome: Ongoing, Progressing  Goal: Patient-Specific Goal (Individualized)  5/21/2023 0548 by Cisco Hylton RN  Outcome: Ongoing, Progressing  5/21/2023 0548 by Cisco Hylton RN  Outcome: Ongoing, Progressing  Goal: Absence of Hospital-Acquired Illness or Injury  5/21/2023 0548 by Cisco Hylton RN  Outcome: Ongoing, Progressing  5/21/2023 0548 by Cisco Hylton RN  Outcome: Ongoing, Progressing  Goal: Optimal Comfort and Wellbeing  5/21/2023 0548 by Cisco Hylton RN  Outcome: Ongoing, Progressing  5/21/2023 0548 by Cisco Hylton RN  Outcome: Ongoing, Progressing  Goal: Readiness for Transition of Care  5/21/2023 0548 by Cisco Hylton RN  Outcome: Ongoing, Progressing  5/21/2023 0548 by Cisco Hylton RN  Outcome: Ongoing, Progressing     Problem: Fall Injury Risk  Goal: Absence of Fall and Fall-Related Injury  5/21/2023 0548 by Cisco Hylton RN  Outcome: Ongoing, Progressing  5/21/2023 0548 by Cisco Hylton RN  Outcome: Ongoing, Progressing

## 2023-05-22 PROBLEM — R50.9 FEVER, UNKNOWN ORIGIN: Status: ACTIVE | Noted: 2023-01-01

## 2023-05-22 NOTE — ASSESSMENT & PLAN NOTE
Podiatry consulted- no acute intervention needed. Not acutely infected  Tylenol and ibuprofen for pain  CT scan negative for fracture.

## 2023-05-22 NOTE — NURSING
"Pt was complaining of inability to urinate. Pt was reassured that her output over the shift has been more than adequate, but she screamed "I can't pee!" MD notified. Bladder scan performed. 8 mL of urine scanned in bladder. Pt was reassured that she's voiding adequately. MD notified. No new orders at this time.  "

## 2023-05-22 NOTE — PT/OT/SLP EVAL
Physical Therapy Evaluation and Treatment    Patient Name: Radha Alfred   MRN: [id number]  Recent Surgery: * No surgery found *      Recommendations:     Discharge Recommendations:  (home with home health vs skilled placement)   Discharge Equipment Recommendations: none   Barriers to discharge: None    Assessment:   HPI:  Patient is 66 yo w/ hx of HF, COPD, Bipolar, Hypothyroidism presenting with acute worsening of SOB today while walking. Patient with 1 month hx of worsening SOB, Abdominal and LE swelling. She cannot lay flat. She is taking medications as prescribed. No fever, chills, cough, NVD. No chest pain.    Radha Alfred is a 67 y.o. female admitted with a medical diagnosis of Acute on chronic heart failure. She presents with the following impairments/functional limitations: weakness, impaired endurance, impaired self care skills, impaired functional mobility, gait instability, impaired balance, pain.     Rehab Prognosis: Good; patient would benefit from acute PT services to address these deficits and reach maximum level of function.    Plan:     During this hospitalization, patient to be seen  (3-5 x/wk) to address the above listed problems via gait training, therapeutic activities, therapeutic exercises    Plan of Care Expires:  (upon discharge from facility)    Subjective     Chief Complaint: acute on chronic heart failure  Patient Comments/Goals: patient requesting to sit up in chair today, agreeable to participate in physical therapy evaluation  Pain/Comfort:  Pain Rating 1:  (6/10 bilateral feet at onset of session, 7/10 at conclusion)  Pain Addressed 1: Distraction, Reposition, Cessation of Activity    Social History:  Living Environment: Patient lives alone in  [place] apartments  with elevator  Prior Level of Function: Prior to admission, patient was modified independent  Equipment Used at Home: cane, straight, walker, rolling, grab bar  DME owned (not currently used): none  Assistance Upon  Discharge:  patient lives alone    Objective:     Communicated with RN prior to session. Patient found with bed in chair position with oxygen, PureWick, peripheral IV, pulse ox (continuous), telemetry upon PT entry to room.    General Precautions: Standard, fall   Orthopedic Precautions: N/A   Braces: N/A    Respiratory Status: Nasal cannula, flow 2.5 L/min    Exams:  Cognition: Patient is oriented to Person, Place, Time, Situation  RLE ROM: WFL  RLE Strength:  3+/5  LLE ROM: WFL  LLE Strength:  3+/5    Functional Mobility:  Gait belt applied - No  Bed Mobility  Supine to Sit: moderate assistance for LE management and trunk management  Sit to Supine: activity did not occur as patient left sitting up in chair at completion of session   Transfers  Sit to Stand: moderate assistance with rolling walker and with cues for hand placement and posture  Bed to Chair: minimum assistance with rolling walker and with cues for posture and management of AD using Step Transfer  Gait  Patient ambulated 5' to chair at bedside with rolling walker and minimum assistance. Patient demonstrates wide base of support, decreased foot clearance, flexed posture, decreased babak, antalgic gait, and shuffle gait.  All lines remained intact throughout ambulation trailBalance  Sitting: contact guard assistance  Standing: minimum assistance    Therapeutic Activities and Exercises:   Patient educated on role of acute care PT and PT POC, safety while in hospital including calling nurse for mobility, and call light usage  Patient educated about importance of OOB mobility and remaining up in chair most of the day.  Patient educated about pursed lip breathing technique and cued for use with mobility.    AM-PAC 6 CLICK MOBILITY  Total Score:     Patient left up in chair with all lines intact, call button in reach, and RN notified.    GOALS:   Pt will perform sup<>sit transfers w/ independence  Pt will have sufficient dynamic balance to sit EOB while  performing ADLs/therex w/ independence  PT will be able to stand up from EOB w/ independence using LRAD  Pt will ambulate 100 feet w/ modified independence using LRAD  Pt will be independent w/ HEP therex on BLE w/ good form and ROM   Pt will require no cueing for dynamic balance or safety awareness when performing transfers and ambulation w/ PT        History:     Past Medical History:   Diagnosis Date    Anxiety     Bipolar 1 disorder     Breast cancer     CHF (congestive heart failure)     COPD (chronic obstructive pulmonary disease)     Depression     Diabetes mellitus     Hypertension     PVD (peripheral vascular disease)     Schizophrenia     Thyroid disease        Past Surgical History:   Procedure Laterality Date    BREAST LUMPECTOMY      TONSILLECTOMY         Time Tracking:     PT Received On: 05/22/23  PT Start Time: 1010  PT Stop Time: 1033  PT Total Time (min): 23 min     Billable Minutes: Evaluation 10 min and Therapeutic Activity 13 min    5/22/2023

## 2023-05-22 NOTE — ASSESSMENT & PLAN NOTE
Acute hypoxic respiratory failure 2/2 Acute on chronic HFrEF exacerbation  Received IV lasix x1 and did not return to baseline  Continue IV lasix for goal net negative 1.5-2L daily  TTE completed  Keep K>4, Mg>2  Daily weights  Strict I's and O's  Tele w/ pulse ox  Wean O2 as tolerated to maintain O2>88  Add GDMT as tolerated

## 2023-05-22 NOTE — ASSESSMENT & PLAN NOTE
Possible in acute exacerbation  Holding on steroids currently  Scheduled treatments  Continue rocephin for possible pneumonia  Sputum culture w/ normal respiratory agustina

## 2023-05-22 NOTE — SUBJECTIVE & OBJECTIVE
Interval History: Febrile again overnight. BC NGTD. CXR w/ interval worsening despite good UOP. Respiratory culture normal respiratory agustina. UA not infectious. Podiatry consulted to look at foot. No acute infection. Procalcitonin wnl. WBC mildly elevated at 12.     DVT US today to look for blood clot causing fever, Respiratory viral panel sent. If patient fevers again today will broaden abx.    BNP downtrending. Patient continues to have O2 requirement. Can continue IV lasix for today but patient may have infectious cause for O2 requirement.    Patient with no BM in 4 days. Will give enema today. Passing gas.    Review of Systems   All other systems reviewed and are negative.  Objective:     Vital Signs (Most Recent):  Temp: 98 °F (36.7 °C) (05/22/23 0730)  Pulse: 95 (05/22/23 0730)  Resp: 20 (05/22/23 0730)  BP: 132/60 (05/22/23 0730)  SpO2: (!) 94 % (05/22/23 0730) Vital Signs (24h Range):  Temp:  [97.1 °F (36.2 °C)-101.2 °F (38.4 °C)] 98 °F (36.7 °C)  Pulse:  [] 95  Resp:  [16-20] 20  SpO2:  [77 %-99 %] 94 %  BP: (106-140)/(54-64) 132/60     Weight: 108.2 kg (238 lb 8.6 oz)  Body mass index is 43.63 kg/m².    Intake/Output Summary (Last 24 hours) at 5/22/2023 0900  Last data filed at 5/22/2023 0514  Gross per 24 hour   Intake 600 ml   Output 2200 ml   Net -1600 ml         Physical Exam      Vitals reviewed  General: NAD, Well developed, Well Nourished  Head: NC/AT  Eyes: EOMI, HONEY  Cardiovascular: Pulses intact distally, Regular Rate and rhythm  Pulmonary: Normal Respiratory Rate, No respiratory distress  Gi: Soft, Non-tender  Extremities: Warm, No edema present, tenderness to right and left foot. No evidence of cellulitis  Skin: Warm, dry  Neuro: Alert, Oriented x3, No focal Deficit  Psych: Appropriate mood and affect      Significant Labs: All pertinent labs within the past 24 hours have been reviewed.    Significant Imaging: I have reviewed all pertinent imaging results/findings within the past 24  hours.

## 2023-05-22 NOTE — PLAN OF CARE
Patient in no apparent distress. Sat's 94-97  % on 2.5 lpm. Aerosol treatments given Q 6 . Will continue to monitor.

## 2023-05-22 NOTE — PROGRESS NOTES
[facility] Medicine  Progress Note    Patient Name: Radha Alfred  MRN: [id number]  Patient Class: IP- Inpatient   Admission Date: 5/17/2023  Length of Stay: 3 days  Attending Physician: Yariel Keane MD  Primary Care Provider: FRANSISCA Rodriguez        Subjective:     Principal Problem:Acute on chronic heart failure        HPI:  Patient is 66 yo w/ hx of HF, COPD, Bipolar, Hypothyroidism presenting with acute worsening of SOB today while walking. Patient with 1 month hx of worsening SOB, Abdominal and LE swelling. She cannot lay flat. She is taking medications as prescribed. No fever, chills, cough, NVD. No chest pain.       Overview/Hospital Course:  No notes on file    Interval History: Febrile again overnight. BC NGTD. CXR w/ interval worsening despite good UOP. Respiratory culture normal respiratory agustina. UA not infectious. Podiatry consulted to look at foot. No acute infection. Procalcitonin wnl. WBC mildly elevated at 12.     DVT US today to look for blood clot causing fever, Respiratory viral panel sent. If patient fevers again today will broaden abx.    BNP downtrending. Patient continues to have O2 requirement. Can continue IV lasix for today but patient may have infectious cause for O2 requirement.    Patient with no BM in 4 days. Will give enema today. Passing gas.    Review of Systems   All other systems reviewed and are negative.  Objective:     Vital Signs (Most Recent):  Temp: 98 °F (36.7 °C) (05/22/23 0730)  Pulse: 95 (05/22/23 0730)  Resp: 20 (05/22/23 0730)  BP: 132/60 (05/22/23 0730)  SpO2: (!) 94 % (05/22/23 0730) Vital Signs (24h Range):  Temp:  [97.1 °F (36.2 °C)-101.2 °F (38.4 °C)] 98 °F (36.7 °C)  Pulse:  [] 95  Resp:  [16-20] 20  SpO2:  [77 %-99 %] 94 %  BP: (106-140)/(54-64) 132/60     Weight: 108.2 kg (238 lb 8.6 oz)  Body mass index is 43.63 kg/m².    Intake/Output Summary (Last 24 hours) at 5/22/2023 0947  Last data filed at 5/22/2023 0530  Gross  per 24 hour   Intake 600 ml   Output 2200 ml   Net -1600 ml         Physical Exam      Vitals reviewed  General: NAD, Well developed, Well Nourished  Head: NC/AT  Eyes: EOMI, HONEY  Cardiovascular: Pulses intact distally, Regular Rate and rhythm  Pulmonary: Normal Respiratory Rate, No respiratory distress  Gi: Soft, Non-tender  Extremities: Warm, No edema present, tenderness to right and left foot. No evidence of cellulitis  Skin: Warm, dry  Neuro: Alert, Oriented x3, No focal Deficit  Psych: Appropriate mood and affect      Significant Labs: All pertinent labs within the past 24 hours have been reviewed.    Significant Imaging: I have reviewed all pertinent imaging results/findings within the past 24 hours.      Assessment/Plan:      * Acute on chronic heart failure  Acute hypoxic respiratory failure 2/2 Acute on chronic HFrEF exacerbation  Received IV lasix x1 and did not return to baseline  Continue IV lasix for goal net negative 1.5-2L daily  TTE completed  Keep K>4, Mg>2  Daily weights  Strict I's and O's  Tele w/ pulse ox  Wean O2 as tolerated to maintain O2>88  Add GDMT as tolerated      Fever, unknown origin  Pulmonary infection highest on differential given CXR findings and O2 requirement  UA not infections  Respiratory culture w/ normal respiratory agustina- procal wnl  COVID negative at admit- Repeat pending but patient unlikely to have false negative test  Sending respiratory viral panel  Procalcitonin wnl  BC NGTD  DVT US today      DJD (degenerative joint disease), ankle and foot, unspecified laterality        Plantar fasciitis  Podiatry consult      Peroneal tendonitis, unspecified laterality        Acute on chronic anemia  Monitor hemoglobin hematocrit  Look for signs of acute blood loss      Right foot pain  Podiatry consulted- no acute intervention needed. Not acutely infected  Tylenol and ibuprofen for pain  CT scan negative for fracture.        Troponin level elevated  Troponin elevation in setting  of volume overload and acute hypoxia  TTE completed  Trended to peak      Hypothyroidism  Continue synthroid      Bipolar 1 disorder  Continue risperidone      COPD (chronic obstructive pulmonary disease)  Possible in acute exacerbation  Holding on steroids currently  Scheduled treatments  Continue rocephin for possible pneumonia  Sputum culture w/ normal respiratory agustina        VTE Risk Mitigation (From admission, onward)         Ordered     enoxaparin injection 40 mg  Daily         05/17/23 1308     IP VTE HIGH RISK PATIENT  Once         05/17/23 1308     Place sequential compression device  Until discontinued         05/17/23 1308                Discharge Planning   BEE:      Code Status: Full Code   Is the patient medically ready for discharge?:     Reason for patient still in hospital (select all that apply): Treatment  Discharge Plan A: Home Health   Discharge Delays: None known at this time              Yariel Keane MD  Department of Hospital Medicine   [facility] [place] [facility], [place]

## 2023-05-22 NOTE — ASSESSMENT & PLAN NOTE
Pulmonary infection highest on differential given CXR findings and O2 requirement  UA not infections  Respiratory culture w/ normal respiratory agustina- procal wnl  COVID negative at admit- Repeat pending but patient unlikely to have false negative test  Sending respiratory viral panel  Procalcitonin wnl  BC NGTD  DVT US today

## 2023-05-22 NOTE — PLAN OF CARE
Problem: Adult Inpatient Plan of Care  Goal: Plan of Care Review  Outcome: Ongoing, Progressing  Goal: Patient-Specific Goal (Individualized)  Outcome: Ongoing, Progressing  Goal: Absence of Hospital-Acquired Illness or Injury  Outcome: Ongoing, Progressing  Goal: Optimal Comfort and Wellbeing  Outcome: Ongoing, Progressing  Goal: Readiness for Transition of Care  Outcome: Ongoing, Progressing     Problem: Fluid Imbalance (Heart Failure)  Goal: Fluid Balance  Outcome: Ongoing, Progressing     Problem: Infection  Goal: Absence of Infection Signs and Symptoms  Outcome: Ongoing, Progressing

## 2023-05-22 NOTE — PLAN OF CARE
[facility] - Intensive Care  Discharge Reassessment    Primary Care Provider: FRANSISCA Rodriguez    Expected Discharge Date:     Patient resting in bed states she sat up for a few hours today. She lives alone & wants home health. If she requires more rehab & help than she can get with home health she will think about going somewhere for therapy. She has a friend who is in assisted living in [place] & will find out where she is. Explained to her that assisted living facilities are usually not covered by insurance but she would have to pay for it. She will call me once she talks to her friend. Will continue to follow for discharge needs.   Reassessment (most recent)       Discharge Reassessment - 05/22/23 1500          Discharge Reassessment    Assessment Type Discharge Planning Reassessment     Did the patient's condition or plan change since previous assessment? No     Discharge Plan discussed with: Patient     Communicated BEE with patient/caregiver Date not available/Unable to determine     Discharge Plan A Home Health     Discharge Plan B Skilled Nursing Facility     DME Needed Upon Discharge  none     Transition of Care Barriers None     Why the patient remains in the hospital Requires continued medical care        Post-Acute Status    Post-Acute Authorization Home Health     Home Health Status Pending medical clearance/testing     Discharge Delays None known at this time

## 2023-05-23 PROBLEM — R53.1 WEAKNESS: Status: ACTIVE | Noted: 2023-01-01

## 2023-05-23 NOTE — SUBJECTIVE & OBJECTIVE
Interval History: Afebrile overnight. Improving respiratory status. Continuing IV lasix and abx today. DVT US negative. BC continue to be NGTD    Review of Systems   All other systems reviewed and are negative.  Objective:     Vital Signs (Most Recent):  Temp: 97.8 °F (36.6 °C) (05/23/23 1138)  Pulse: 93 (05/23/23 1138)  Resp: 19 (05/23/23 1138)  BP: 137/62 (05/23/23 1138)  SpO2: 97 % (05/23/23 1138) Vital Signs (24h Range):  Temp:  [97.8 °F (36.6 °C)-99.2 °F (37.3 °C)] 97.8 °F (36.6 °C)  Pulse:  [] 93  Resp:  [14-19] 19  SpO2:  [91 %-98 %] 97 %  BP: (105-174)/(51-74) 137/62     Weight: 104.4 kg (230 lb 2.6 oz)  Body mass index is 42.1 kg/m².    Intake/Output Summary (Last 24 hours) at 5/23/2023 1252  Last data filed at 5/23/2023 1247  Gross per 24 hour   Intake 1680 ml   Output 3900 ml   Net -2220 ml         Physical Exam      Vitals reviewed  General: NAD, Well developed, Obese  Head: NC/AT  Eyes: EOMI, HONEY  Cardiovascular: Pulses intact distally, Regular Rate  Pulmonary: Normal Respiratory Rate, No respiratory distress  Gi: Soft, Non-tender  Extremities: Warm, No edema present  Skin: Warm, dry  Neuro: Alert, Oriented x3, No focal Deficit  Psych: Appropriate mood and affect      Significant Labs: All pertinent labs within the past 24 hours have been reviewed.    Significant Imaging: I have reviewed all pertinent imaging results/findings within the past 24 hours.

## 2023-05-23 NOTE — PT/OT/SLP PROGRESS
Physical Therapy      Patient Name:  Radha Alfred   MRN:  [id number]    Patient not seen today secondary to sitting up on BSC x 2 attempts . Will follow-up tomorrow.

## 2023-05-23 NOTE — PLAN OF CARE
05/23/23 1300   Medicare Message   Important Message from Medicare regarding Discharge Appeal Rights Given to patient/caregiver;Explained to patient/caregiver;Signed/date by patient/caregiver   Date IMM was signed 05/23/23   Time IMM was signed 1300

## 2023-05-23 NOTE — NURSING
Pt verbally abusive, yelling at Resp Therapist at bedside, HONG Valdes overheard and stepped in to tell her to stop yelling at staff that it would not be tolerated. This is the 2nd time HONG Valdes has talked to patient today for the same thing. After continued conversations with patient for yelling at staff and making accusations and refusing to change behaviors, Abbie House Supervisor at bedside to speak with patient.

## 2023-05-23 NOTE — PLAN OF CARE
05/23/23 1505   Post-Acute Status   Post-Acute Authorization Placement   Post-Acute Placement Status Referrals Sent   Discharge Plan   Discharge Plan A Skilled Nursing Facility     Patient left message she [facility] & would like to go to [facility]. Explained to her they are out of network. She is willing to go to [facility]. Referral sent to them.

## 2023-05-23 NOTE — PROGRESS NOTES
[facility] Medicine  Progress Note    Patient Name: Radha Alfred  MRN: [id number]  Patient Class: IP- Inpatient   Admission Date: 5/17/2023  Length of Stay: 4 days  Attending Physician: Yariel Keane MD  Primary Care Provider: FRANSISCA Rodriguez        Subjective:     Principal Problem:Acute on chronic heart failure        HPI:  Patient is 68 yo w/ hx of HF, COPD, Bipolar, Hypothyroidism presenting with acute worsening of SOB today while walking. Patient with 1 month hx of worsening SOB, Abdominal and LE swelling. She cannot lay flat. She is taking medications as prescribed. No fever, chills, cough, NVD. No chest pain.       Overview/Hospital Course:  No notes on file    Interval History: Afebrile overnight. Improving respiratory status. Continuing IV lasix and abx today. DVT US negative. BC continue to be NGTD    Review of Systems   All other systems reviewed and are negative.  Objective:     Vital Signs (Most Recent):  Temp: 97.8 °F (36.6 °C) (05/23/23 1138)  Pulse: 93 (05/23/23 1138)  Resp: 19 (05/23/23 1138)  BP: 137/62 (05/23/23 1138)  SpO2: 97 % (05/23/23 1138) Vital Signs (24h Range):  Temp:  [97.8 °F (36.6 °C)-99.2 °F (37.3 °C)] 97.8 °F (36.6 °C)  Pulse:  [] 93  Resp:  [14-19] 19  SpO2:  [91 %-98 %] 97 %  BP: (105-174)/(51-74) 137/62     Weight: 104.4 kg (230 lb 2.6 oz)  Body mass index is 42.1 kg/m².    Intake/Output Summary (Last 24 hours) at 5/23/2023 1252  Last data filed at 5/23/2023 1247  Gross per 24 hour   Intake 1680 ml   Output 3900 ml   Net -2220 ml         Physical Exam      Vitals reviewed  General: NAD, Well developed, Obese  Head: NC/AT  Eyes: EOMI, HONEY  Cardiovascular: Pulses intact distally, Regular Rate  Pulmonary: Normal Respiratory Rate, No respiratory distress  Gi: Soft, Non-tender  Extremities: Warm, No edema present  Skin: Warm, dry  Neuro: Alert, Oriented x3, No focal Deficit  Psych: Appropriate mood and affect      Significant Labs: All  pertinent labs within the past 24 hours have been reviewed.    Significant Imaging: I have reviewed all pertinent imaging results/findings within the past 24 hours.      Assessment/Plan:      * Acute on chronic heart failure  Acute hypoxic respiratory failure 2/2 Acute on chronic HFrEF exacerbation  Received IV lasix x1 and did not return to baseline  Continue IV lasix for goal net negative 1.5-2L daily  TTE completed  Keep K>4, Mg>2  Daily weights  Strict I's and O's  Tele w/ pulse ox  Wean O2 as tolerated to maintain O2>88  Add GDMT as tolerated      Weakness  PT/OT  Possibly skilled placement      Fever, unknown origin  Pulmonary infection highest on differential given CXR findings and O2 requirement  UA not infections  Respiratory culture w/ normal respiratory agustina- procal wnl  COVID negative x2 to  respiratory viral panel pending  BC NGTD  DVT US negative      DJD (degenerative joint disease), ankle and foot, unspecified laterality        Plantar fasciitis  Podiatry consult      Peroneal tendonitis, unspecified laterality        Acute on chronic anemia  Monitor hemoglobin hematocrit  Look for signs of acute blood loss      Right foot pain  Podiatry consulted- no acute intervention needed. Not acutely infected  Tylenol and ibuprofen for pain  CT scan negative for fracture.        Troponin level elevated  Troponin elevation in setting of volume overload and acute hypoxia  TTE completed  Trended to peak      Hypothyroidism  Continue synthroid      Bipolar 1 disorder  Continue risperidone      COPD (chronic obstructive pulmonary disease)  Possible in acute exacerbation  Holding on steroids currently  Scheduled treatments  Continue rocephin for possible pneumonia  Sputum culture w/ normal respiratory agustina        VTE Risk Mitigation (From admission, onward)         Ordered     enoxaparin injection 40 mg  Daily         05/17/23 1308     IP VTE HIGH RISK PATIENT  Once         05/17/23 1308     Place sequential  compression device  Until discontinued         05/17/23 1308                Discharge Planning   BEE:      Code Status: Full Code   Is the patient medically ready for discharge?:     Reason for patient still in hospital (select all that apply): Treatment  Discharge Plan A: Home Health   Discharge Delays: None known at this time              Yariel Keane MD  Department of [facility] Medicine   [facility]

## 2023-05-23 NOTE — PT/OT/SLP PROGRESS
Occupational Therapy   Treatment    Name: Radha Alfred  MRN: [id number]  Admitting Diagnosis:  Acute on chronic heart failure       Recommendations:     Discharge Recommendations:  Skilled nursing facility vs home   Discharge Equipment Recommendations:   Will continue to assess  Barriers to discharge:   None    Patient is 66 yo w/ hx of HF, COPD, Bipolar, Hypothyroidism presenting with acute worsening of SOB today while walking. Patient with 1 month hx of worsening SOB, Abdominal and LE swelling. She cannot lay flat. She is taking medications as prescribed. No fever, chills, cough, NVD. No chest pain.       Rehab Prognosis:  Good    Plan:     Patient to be seen   to address the above listed problems via  OT spoke with patient's nurse prior to entering patient's room.    General Precautions: Standard,      Orthopedic Precautions:   Braces:    Respiratory Status: 2L  O2  Occupational Performance:     Bed Mobility:    Not assessed secondary to patient being in bedside chair prior to and at the conclusion of session.    Functional Mobility/Transfers:  Patient requires Lasha with functional transfers.  Patient requires Lasha when ambulating 15 feet with RW.    Activities of Daily Living:  Patient requires Lasha with ADLs.       Treatment & Education:  Patient tolerated session well. Patient requires Lasha with functional transfers.  Patient performed BUE exercises during session.      Cruzito Tai OTR/L               5/23/2023

## 2023-05-23 NOTE — PLAN OF CARE
[facility] - Intensive Care  Discharge Reassessment    Primary Care Provider: FRANSISCA Rodriguez    Expected Discharge Date:     Patient in agreement to go somewhere for rehab. She would like to go to [facility] but they are out of network with her insurance. [facility] & [facility] are in network. Patient [facility] Will continue to follow.     Reassessment (most recent)       Discharge Reassessment - 05/23/23 1300          Discharge Reassessment    Assessment Type Discharge Planning Reassessment     Did the patient's condition or plan change since previous assessment? No     Discharge Plan discussed with: Patient     Communicated BEE with patient/caregiver Yes     Discharge Plan A Skilled Nursing Facility     Discharge Plan B Home Health     DME Needed Upon Discharge  none     Transition of Care Barriers None        Post-Acute Status    Post-Acute Authorization Placement     Post-Acute Placement Status Referrals Sent     Discharge Delays None known at this time

## 2023-05-23 NOTE — PLAN OF CARE
Problem: Adult Inpatient Plan of Care  Goal: Plan of Care Review  Outcome: Ongoing, Progressing  Goal: Patient-Specific Goal (Individualized)  Outcome: Ongoing, Progressing  Goal: Absence of Hospital-Acquired Illness or Injury  Outcome: Ongoing, Progressing  Goal: Optimal Comfort and Wellbeing  Outcome: Ongoing, Progressing  Goal: Readiness for Transition of Care  Outcome: Ongoing, Progressing     Problem: Bariatric Environmental Safety  Goal: Safety Maintained with Care  Outcome: Ongoing, Progressing     Problem: Skin Injury Risk Increased  Goal: Skin Health and Integrity  Outcome: Ongoing, Progressing     Problem: Adjustment to Illness (Heart Failure)  Goal: Optimal Coping  Outcome: Ongoing, Progressing     Problem: Cardiac Output Decreased (Heart Failure)  Goal: Optimal Cardiac Output  Outcome: Ongoing, Progressing     Problem: Dysrhythmia (Heart Failure)  Goal: Stable Heart Rate and Rhythm  Outcome: Ongoing, Progressing     Problem: Fluid Imbalance (Heart Failure)  Goal: Fluid Balance  Outcome: Ongoing, Progressing     Problem: Functional Ability Impaired (Heart Failure)  Goal: Optimal Functional Ability  Outcome: Ongoing, Progressing     Problem: Oral Intake Inadequate (Heart Failure)  Goal: Optimal Nutrition Intake  Outcome: Ongoing, Progressing     Problem: Respiratory Compromise (Heart Failure)  Goal: Effective Oxygenation and Ventilation  Outcome: Ongoing, Progressing     Problem: Sleep Disordered Breathing (Heart Failure)  Goal: Effective Breathing Pattern During Sleep  Outcome: Ongoing, Progressing     Problem: Fall Injury Risk  Goal: Absence of Fall and Fall-Related Injury  Outcome: Ongoing, Progressing     Problem: Infection  Goal: Absence of Infection Signs and Symptoms  Outcome: Ongoing, Progressing   POC reviewed at bedside. Questions and concerns addressed. VSS. Placed bed in low and locked position. Call light within reach. Side rails up x2. Instructed to call for any needs. Verbalized  understanding of all instructions. Frequent rounds.

## 2023-05-23 NOTE — ASSESSMENT & PLAN NOTE
Pulmonary infection highest on differential given CXR findings and O2 requirement  UA not infections  Respiratory culture w/ normal respiratory agustina- procal wnl  COVID negative x2 to  respiratory viral panel pending  BC NGTD  DVT US negative

## 2023-05-23 NOTE — PLAN OF CARE
Problem: Cardiac Output Decreased (Heart Failure)  Goal: Optimal Cardiac Output  Outcome: Ongoing, Progressing  Intervention: Optimize Cardiac Output  Flowsheets (Taken 5/23/2023 1650)  Environmental Support: calm environment promoted     Problem: Fluid Imbalance (Heart Failure)  Goal: Fluid Balance  Outcome: Ongoing, Progressing  Intervention: Monitor and Manage Fluid Balance  Flowsheets (Taken 5/23/2023 1650)  Fluid/Electrolyte Management: (Diuretics given) other (see comments)     Problem: Respiratory Compromise (Heart Failure)  Goal: Effective Oxygenation and Ventilation  Outcome: Ongoing, Progressing  Intervention: Promote Airway Secretion Clearance  Flowsheets (Taken 5/23/2023 1650)  Breathing Techniques/Airway Clearance: deep/controlled cough encouraged  Cough And Deep Breathing: done independently per patient  Intervention: Optimize Oxygenation and Ventilation  Flowsheets (Taken 5/23/2023 1650)  Airway/Ventilation Management: airway patency maintained     Plan of care reviewed with ptInderjit VELASQUEZ throughout shift. PRN pain medications provided as ordered. Cardiac diet. Patient was being disrespectful to staff. House supervisor had to intervene. Patient aware that behavior needs to improve. Patient up to bedside commode, but also has purewick in place. VSS.  Safety maintained. Will continue to monitor. No complaints at this time.

## 2023-05-24 NOTE — PT/OT/SLP PROGRESS
Occupational Therapy   Treatment    Name: Radha Alfred  MRN: [id number]  Admitting Diagnosis:  Acute on chronic heart failure       Recommendations:     Discharge Recommendations:  Skilled nursing facility vs home   Discharge Equipment Recommendations:   Will continue to assess  Barriers to discharge:   None    Patient is 66 yo w/ hx of HF, COPD, Bipolar, Hypothyroidism presenting with acute worsening of SOB today while walking. Patient with 1 month hx of worsening SOB, Abdominal and LE swelling. She cannot lay flat. She is taking medications as prescribed. No fever, chills, cough, NVD. No chest pain.       Rehab Prognosis:  Good    Plan:     Patient to be seen   to address the above listed problems via  OT spoke with patient's nurse prior to entering patient's room.    General Precautions: Standard,      Orthopedic Precautions:   Braces:    Respiratory Status: 2L  O2  Occupational Performance:     Bed Mobility:    Not assessed secondary to patient being in bedside chair prior to and at the conclusion of session.    Functional Mobility/Transfers:  Patient requires CGA with functional transfers and when ambulating 12 feet.  Patient reported BLE pain when ambulating.    Activities of Daily Living:  Patient requires Lasha with ADLs.       Treatment & Education:  Patient tolerated session well. Patient requires CGA with functional transfers and when ambulating.  Patient was educated on the importance of frequent mobilization.        Cruzito Tai, OTR/L               5/24/2023

## 2023-05-24 NOTE — NURSING
Supervisor present per pt request. Pt agitated. Request to have oxygen off. Request oxygen level. Pulse ox reading 95% per room air per tele monitor. Supervisor speaking with pt about multiple complaints. Related to o2 . Pt demands vitals to be retaken and refuses to wear oxygen.

## 2023-05-24 NOTE — CARE UPDATE
Pt. Continues to verbally abuse staff by screaming at them over her oxygen issue.  Pt accuses Respiratory of turning up her 02 and now she accuses them of turning down her oxygen. She is moving the flowmeter up and down herself and does not remember doing it. Seems no one can please her. Her breath sounds have gotten better but pt is still Hypoxic.She screamed at her nurse and gives Respiratory problems problems each time they go in to Provide therapy.    Theresa RT

## 2023-05-24 NOTE — NURSING
AVS attempted to be reviewed. Full medication list attempted to be read but patient continues to interrupt nurse and be verbally abusive while screaming continually. Continuing to provide emotional support while trying to calm and educate patient. AVS finished reviewed and patient states she has no questions or concerns at this time. [facility] on call number reviewed in case of future questions and concerns

## 2023-05-24 NOTE — PLAN OF CARE
[facility] - Intensive Care  Discharge Final Note    Primary Care Provider: FRANSISCA Rodriguez    Expected Discharge Date:   Patient has decided she doesn't want to go anywhere for rehab but wants to go home as worried she may lose her apartment. She has been up & moving around in the room & feels ok. Provided her with follow up appointments with Aimee Marques, NP, Dr Calvo, & Dr Sloan. Dr Sloan's office is unsure if they are in network but patient still would like to see him. Home health orders sent to [facility]. She will take a cab home. She has money once she gets there. No other needs at this time. [facility] notified of not coming to them as patient has decided to return home with home health.  Final Discharge Note (most recent)       Final Note - 05/24/23 1631          Final Note    Assessment Type Final Discharge Note     Anticipated Discharge Disposition Home-Health Care [facility]     What phone number can be called within the next 1-3 days to see how you are doing after discharge? [phone]     Hospital Resources/Appts/Education Provided Appointments scheduled and added to AVS        Post-Acute Status    Post-Acute Authorization Home Health     Post-Acute Placement Status Patient declined/refused     Home Health Status Set-up Complete/Auth obtained     Discharge Delays None known at this time                     Important Message from Medicare  Important Message from Medicare regarding Discharge Appeal Rights: Given to patient/caregiver, Explained to patient/caregiver, Signed/date by patient/caregiver     Date IMM was signed: 05/23/23  Time IMM was signed: 1300    Contact Info       Dr Wild Calvo    [address] [place] [address] [address]  [place] [address] [phone]       Next Steps: Go on 5/29/2023    Instructions: appointment Monday May 29th at 8:30am to get established with gastroenterologist    [facility]    [address]  [place] [address]   Phone:  [phone]       Next Steps: Follow up    Instructions: will provide home health services    FRANSISCA Rodriguez   Specialty: Family Medicine   Relationship: PCP - General    [address]  [place] [address]   Phone: [phone]       Next Steps: Follow up    FRANSISCA Rodriguez    [address] [place], [place]  [phone]       Next Steps: Go on 5/31/2023    Instructions: appointment Wednesday May 31st at 12:00pm for hospital follow up    Andrew Sloan DPM   Specialty: Podiatry    [place] [facility]  [address] [place] [address]       Next Steps: Go on 6/5/2023    Instructions: appointment Monday June 5th at 1:30pm for podiatry follow up

## 2023-05-24 NOTE — CARE UPDATE
05/24/23 0800   PRE-TX-O2   $ Is the patient on Low Flow Oxygen? Yes   Flow (L/min) 1   Pulse Oximetry Type Intermittent   $ Pulse Oximetry - Multiple Charge Pulse Oximetry - Multiple   Oximetry Probe Site Applied   Pulse 81   Resp 18

## 2023-05-24 NOTE — NURSING
Pt requested kleenex. Nurse entered room resp therapist haroldo present. Pt agitated. Pt yelling at nurse and therapist. Argumentative and pointing finger at nurse. Nurse attempting to calm and provided emotional support with pt. Nurse unable to and reminded pt to call if anything need. Call button within reach.

## 2023-05-24 NOTE — CARE UPDATE
05/24/23 0920   Home Oxygen Qualification   $ Home O2 Qualification Tech time 15 minutes   Room Air SpO2 At Rest 95 %   Room Air SpO2 During Ambulation 93 %   SpO2 Post Ambulation 94 %   Post Ambulation Heart Rate 68 bpm   Home O2 Eval Comments Walked patient with PT and monitored pulse ox during walk. Lowest patient SpO2 dropped was 92% during ambulation. Was instructed to take slow deep breaths while ambulating. After ambulation patients SpO2 on RA was 94%-96%

## 2023-05-24 NOTE — ASSESSMENT & PLAN NOTE
Acute hypoxic respiratory failure 2/2 Acute on chronic HFrEF exacerbation  Received IV lasix x1 and did not return to baseline  Switch to PO diuretic  TTE completed  Keep K>4, Mg>2  Daily weights  Strict I's and O's  Tele w/ pulse ox  Wean O2 as tolerated to maintain O2>88  Add GDMT as tolerated

## 2023-05-24 NOTE — SUBJECTIVE & OBJECTIVE
Interval History: Afebrile overnight. Weaned to RA and did not require O2 with ambulation. Switched to PO abx. Continues to have foot pain. Advised patient to wear shoes with ambulation. Patient declined labs this morning. Patient medically clear for transfer to next level of care    Review of Systems   All other systems reviewed and are negative.  Objective:     Vital Signs (Most Recent):  Temp: 97.9 °F (36.6 °C) (05/24/23 1150)  Pulse: 99 (05/24/23 1223)  Resp: 18 (05/24/23 1223)  BP: (!) 150/69 (05/24/23 1150)  SpO2: (!) 94 % (05/24/23 1223) Vital Signs (24h Range):  Temp:  [96.5 °F (35.8 °C)-98.1 °F (36.7 °C)] 97.9 °F (36.6 °C)  Pulse:  [] 99  Resp:  [16-18] 18  SpO2:  [92 %-98 %] 94 %  BP: (120-162)/(55-72) 150/69     Weight: 106.6 kg (235 lb 0.2 oz)  Body mass index is 42.98 kg/m².    Intake/Output Summary (Last 24 hours) at 5/24/2023 1411  Last data filed at 5/24/2023 1340  Gross per 24 hour   Intake 720 ml   Output 1300 ml   Net -580 ml         Physical Exam      Vitals reviewed  General: NAD, Well developed, Obese  Head: NC/AT  Eyes: EOMI, HONEY  Cardiovascular: Pulses intact distally, Regular Rate  Pulmonary: Normal Respiratory Rate, No respiratory distress  Gi: Soft, Non-tender  Extremities: Warm, No edema present  Skin: Warm, dry  Neuro: Alert, Oriented x3, No focal Deficit  Psych: Appropriate mood and affect         Significant Labs: All pertinent labs within the past 24 hours have been reviewed.    Significant Imaging: I have reviewed all pertinent imaging results/findings within the past 24 hours.

## 2023-05-24 NOTE — NURSING
Pt assisted to chair. Pt oxygen set on o2 0.5l pulse ox 95%. Pt agitated pt requesting to remove oxygen. Pt removed oxygen. Pericare performed. Pads changed. Large amount urine noted to pad. Purwic changed. Pads changed. Gown changed. Emotional support provided.

## 2023-05-24 NOTE — PLAN OF CARE
Problem: Adult Inpatient Plan of Care  Goal: Plan of Care Review  5/24/2023 1723 by Margarita Anderson RN  Outcome: Met  5/24/2023 1059 by Margarita Anderson RN  Outcome: Ongoing, Progressing  Goal: Patient-Specific Goal (Individualized)  5/24/2023 1723 by Margarita Anderson RN  Outcome: Met  5/24/2023 1059 by Margarita Adnerson RN  Outcome: Ongoing, Progressing  Goal: Absence of Hospital-Acquired Illness or Injury  5/24/2023 1723 by Margarita Anderson RN  Outcome: Met  5/24/2023 1059 by Margarita Anderson RN  Outcome: Ongoing, Progressing  Goal: Optimal Comfort and Wellbeing  5/24/2023 1723 by Margarita Anderson RN  Outcome: Met  5/24/2023 1059 by Margarita Anderson RN  Outcome: Ongoing, Progressing  Goal: Readiness for Transition of Care  5/24/2023 1723 by Margarita Anderson RN  Outcome: Met  5/24/2023 1059 by Margarita Anderson RN  Outcome: Ongoing, Progressing     Problem: Bariatric Environmental Safety  Goal: Safety Maintained with Care  5/24/2023 1723 by Margarita Anderson RN  Outcome: Met  5/24/2023 1059 by Margarita Anderson RN  Outcome: Ongoing, Progressing     Problem: Skin Injury Risk Increased  Goal: Skin Health and Integrity  5/24/2023 1723 by Margarita Anderson RN  Outcome: Met  5/24/2023 1059 by Margarita Anderson RN  Outcome: Ongoing, Progressing     Problem: Adjustment to Illness (Heart Failure)  Goal: Optimal Coping  5/24/2023 1723 by Margarita Anderson RN  Outcome: Met  5/24/2023 1059 by Margarita Anderson RN  Outcome: Ongoing, Progressing     Problem: Cardiac Output Decreased (Heart Failure)  Goal: Optimal Cardiac Output  5/24/2023 1723 by Margarita Anderson RN  Outcome: Met  5/24/2023 1059 by Margarita Anderson RN  Outcome: Ongoing, Progressing     Problem: Dysrhythmia (Heart Failure)  Goal: Stable Heart Rate and Rhythm  5/24/2023 1723 by Margarita Anderson RN  Outcome: Met  5/24/2023 1059 by Margarita Anderson RN  Outcome: Ongoing, Progressing     Problem: Fluid Imbalance (Heart Failure)  Goal: Fluid Balance  5/24/2023 1723 by Margarita Anderson RN  Outcome: Met  5/24/2023 1059 by Margarita Anderson,  RN  Outcome: Ongoing, Progressing     Problem: Functional Ability Impaired (Heart Failure)  Goal: Optimal Functional Ability  5/24/2023 1723 by Margarita Anderson RN  Outcome: Met  5/24/2023 1059 by Margarita Anderson RN  Outcome: Ongoing, Progressing     Problem: Oral Intake Inadequate (Heart Failure)  Goal: Optimal Nutrition Intake  5/24/2023 1723 by Margarita Anderson RN  Outcome: Met  5/24/2023 1059 by Margarita Anderson RN  Outcome: Ongoing, Progressing     Problem: Respiratory Compromise (Heart Failure)  Goal: Effective Oxygenation and Ventilation  5/24/2023 1723 by Margarita Anderson RN  Outcome: Met  5/24/2023 1059 by Margarita Anderson RN  Outcome: Ongoing, Progressing     Problem: Sleep Disordered Breathing (Heart Failure)  Goal: Effective Breathing Pattern During Sleep  5/24/2023 1723 by Margarita Anderson RN  Outcome: Met  5/24/2023 1059 by Margarita Anderson RN  Outcome: Ongoing, Progressing     Problem: Fall Injury Risk  Goal: Absence of Fall and Fall-Related Injury  5/24/2023 1723 by Margarita Anderson RN  Outcome: Met  5/24/2023 1059 by Margarita Anderson RN  Outcome: Ongoing, Progressing     Problem: Infection  Goal: Absence of Infection Signs and Symptoms  5/24/2023 1723 by Margarita Anderson RN  Outcome: Met  5/24/2023 1059 by Margarita Anderson RN  Outcome: Ongoing, Progressing

## 2023-05-24 NOTE — CARE UPDATE
05/24/23 1223   Patient Assessment/Suction   Level of Consciousness (AVPU) alert   Respiratory Effort Normal;Unlabored   Expansion/Accessory Muscles/Retractions no use of accessory muscles;expansion symmetric   All Lung Fields Breath Sounds Anterior:;Posterior:   ANDRA Breath Sounds diminished   LLL Breath Sounds diminished;wheezes, expiratory   RUL Breath Sounds diminished   RML Breath Sounds diminished   RLL Breath Sounds diminished;wheezes, expiratory   Rhythm/Pattern, Respiratory unlabored;pattern regular;depth regular   Cough Frequency frequent   Cough Type good   PRE-TX-O2   Device (Oxygen Therapy) room air   SpO2 (!) 94 %   Pulse Oximetry Type Intermittent   $ Pulse Oximetry - Multiple Charge Pulse Oximetry - Multiple   Oximetry Probe Site Applied   Pulse 99   Resp 18   Positioning   Body Position sitting up in bed   Aerosol Therapy   $ Aerosol Therapy Charges Aerosol Treatment   Daily Review of Necessity (SVN) completed   Treatment Route (SVN) mouthpiece;oxygen   Patient Position (SVN) sitting on edge of bed   Post Treatment Assessment (SVN) breath sounds improved   Signs of Intolerance (SVN) none   Breath Sounds Post-Respiratory Treatment   Throughout All Fields Post-Treatment All Fields   Throughout All Fields Post-Treatment Anterior:;Posterior:;diminished   Post-treatment Heart Rate (beats/min) 89   Post-treatment Resp Rate (breaths/min) 18   Education   $ Education Bronchodilator

## 2023-05-24 NOTE — PT/OT/SLP PROGRESS
Physical Therapy Treatment    Patient Name:  Radha Alfred   MRN:  [id number]    Recommendations:     Discharge Recommendations:  (home with home health vs skilled placement)  Discharge Equipment Recommendations: bedside commode  Barriers to discharge: None    Assessment:   HPI:  Patient is 66 yo w/ hx of HF, COPD, Bipolar, Hypothyroidism presenting with acute worsening of SOB today while walking. Patient with 1 month hx of worsening SOB, Abdominal and LE swelling. She cannot lay flat. She is taking medications as prescribed. No fever, chills, cough, NVD. No chest pain.    Radha Alfred is a 67 y.o. female admitted with a medical diagnosis of Acute on chronic heart failure.  She presents with the following impairments/functional limitations: weakness, impaired endurance, impaired self care skills, impaired functional mobility, gait instability, impaired balance, pain.    Rehab Prognosis: Good; patient would benefit from acute skilled PT services to address these deficits and reach maximum level of function.    Recent Surgery: * No surgery found *      Plan:     During this hospitalization, patient to be seen  (3-5 x/wk) to address the identified rehab impairments via gait training, therapeutic activities, therapeutic exercises and progress toward the following goals:    Plan of Care Expires:   (upon discharge from facility)    Subjective     Chief Complaint: acute on chronic heart failure  Patient/Family Comments/goals: patient stating she needs to use the [facility]  Pain/Comfort:  Pain Rating 1:  (patient reporting pain and swelling in her bilateral feet rated 7/10)      Objective:     Communicated with RN prior to session.  Patient found HOB elevated with oxygen, PureWick, peripheral IV, pulse ox (continuous), telemetry upon PT entry to room.     General Precautions: Standard, fall  Orthopedic Precautions: N/A  Braces: N/A  Respiratory Status: Room air     Functional Mobility:  Bed Mobility:     Supine to Sit: contact guard  assistance  Transfers:  Sit to Stand:  contact guard assistance with rolling walker  Bed to BSC: contact guard assistance using  Step Transfer      AM-PAC 6 CLICK MOBILITY        Therapeutic Activities and Exercises:   Patient educated on role of acute care PT and PT POC, safety while in hospital including calling nurse for mobility, and call light usage  Patient educated about importance of OOB mobility and remaining up in chair most of the day.  Patient educated about pursed lip breathing technique and cued for use with mobility.  Patient performed supine to sit with CGA  Patient performed bed to BSC transfer with CGA using step transfer    Patient left  sitting up on BSC  with all lines intact, call button in reach, and RN notified..    GOALS:   See initial evaluation    Time Tracking:     PT Received On: 05/24/23  PT Start Time: 1310     PT Stop Time: 1324  PT Total Time (min): 14 min     Billable Minutes: Therapeutic Activity 14 min    Treatment Type: Treatment  PT/PTA: PT           05/24/2023

## 2023-05-24 NOTE — ASSESSMENT & PLAN NOTE
Possible in acute exacerbation- appears to have resolved- patient on RA  PRN treatments  Switch to PO abx for final day of abx  Sputum culture w/ normal respiratory agustina

## 2023-05-24 NOTE — NURSING
Patient is being discharged home via WC with family member. Patient assisted into vehicle via RN. IV site CDI with no redness, swelling, or drainage. Tele removed prior. All patient needs met and anticipated.

## 2023-05-24 NOTE — CARE UPDATE
05/24/23 0723   Patient Assessment/Suction   Level of Consciousness (AVPU) alert   Respiratory Effort Unlabored   Expansion/Accessory Muscles/Retractions no use of accessory muscles;expansion symmetric;no retractions   All Lung Fields Breath Sounds Anterior:   ANDRA Breath Sounds wheezes, inspiratory;diminished   LLL Breath Sounds diminished   RUL Breath Sounds diminished   RML Breath Sounds diminished   RLL Breath Sounds diminished   Rhythm/Pattern, Respiratory depth regular;pattern regular;unlabored   Cough Frequency frequent   Cough Type productive   Secretions Amount small   Secretions Color yellow;white   Secretions Characteristics thick   PRE-TX-O2   Device (Oxygen Therapy) nasal cannula with humidification   $ Is the patient on Low Flow Oxygen? Yes   Flow (L/min) 2   Oxygen Concentration (%) 28   SpO2 97 %   Pulse Oximetry Type Intermittent   $ Pulse Oximetry - Multiple Charge Pulse Oximetry - Multiple   Oximetry Probe Site Applied   Pulse 87   Resp 18   Aerosol Therapy   $ Aerosol Therapy Charges Aerosol Treatment   Daily Review of Necessity (SVN) completed   Respiratory Treatment Status (SVN) given   Treatment Route (SVN) oxygen;mouthpiece   Patient Position (SVN) sitting in chair   Post Treatment Assessment (SVN) breath sounds improved   Signs of Intolerance (SVN) none   Breath Sounds Post-Respiratory Treatment   Throughout All Fields Post-Treatment All Fields   Throughout All Fields Post-Treatment Anterior:;diminished   Post-treatment Heart Rate (beats/min) 91   Post-treatment Resp Rate (breaths/min) 18   Education   $ Education Bronchodilator

## 2023-05-24 NOTE — PROGRESS NOTES
Formerly [facility] Medicine  Progress Note    Patient Name: Radha Alfred  MRN: [id number]  Patient Class: IP- Inpatient   Admission Date: 5/17/2023  Length of Stay: 5 days  Attending Physician: Yariel Keane MD  Primary Care Provider: FRANSISCA Rodriguez        Subjective:     Principal Problem:Acute on chronic heart failure        HPI:  Patient is 68 yo w/ hx of HF, COPD, Bipolar, Hypothyroidism presenting with acute worsening of SOB today while walking. Patient with 1 month hx of worsening SOB, Abdominal and LE swelling. She cannot lay flat. She is taking medications as prescribed. No fever, chills, cough, NVD. No chest pain.       Overview/Hospital Course:  No notes on file    Interval History: Afebrile overnight. Weaned to RA and did not require O2 with ambulation. Switched to PO abx. Continues to have foot pain. Advised patient to wear shoes with ambulation. Patient declined labs this morning. Patient medically clear for transfer to next level of care    Review of Systems   All other systems reviewed and are negative.  Objective:     Vital Signs (Most Recent):  Temp: 97.9 °F (36.6 °C) (05/24/23 1150)  Pulse: 99 (05/24/23 1223)  Resp: 18 (05/24/23 1223)  BP: (!) 150/69 (05/24/23 1150)  SpO2: (!) 94 % (05/24/23 1223) Vital Signs (24h Range):  Temp:  [96.5 °F (35.8 °C)-98.1 °F (36.7 °C)] 97.9 °F (36.6 °C)  Pulse:  [] 99  Resp:  [16-18] 18  SpO2:  [92 %-98 %] 94 %  BP: (120-162)/(55-72) 150/69     Weight: 106.6 kg (235 lb 0.2 oz)  Body mass index is 42.98 kg/m².    Intake/Output Summary (Last 24 hours) at 5/24/2023 1411  Last data filed at 5/24/2023 1340  Gross per 24 hour   Intake 720 ml   Output 1300 ml   Net -580 ml         Physical Exam      Vitals reviewed  General: NAD, Well developed, Obese  Head: NC/AT  Eyes: EOMI, HONEY  Cardiovascular: Pulses intact distally, Regular Rate  Pulmonary: Normal Respiratory Rate, No respiratory distress  Gi: Soft, Non-tender  Extremities: Warm, No  edema present  Skin: Warm, dry  Neuro: Alert, Oriented x3, No focal Deficit  Psych: Appropriate mood and affect         Significant Labs: All pertinent labs within the past 24 hours have been reviewed.    Significant Imaging: I have reviewed all pertinent imaging results/findings within the past 24 hours.      Assessment/Plan:      * Acute on chronic heart failure  Acute hypoxic respiratory failure 2/2 Acute on chronic HFrEF exacerbation  Received IV lasix x1 and did not return to baseline  Switch to PO diuretic  TTE completed  Keep K>4, Mg>2  Daily weights  Strict I's and O's  Tele w/ pulse ox  Wean O2 as tolerated to maintain O2>88  Add GDMT as tolerated      Weakness  PT/OT  Possibly skilled placement      Fever, unknown origin  Pulmonary infection highest on differential given CXR findings and O2 requirement  UA not infections  Respiratory culture w/ normal respiratory agustina- procal wnl  COVID negative x2 to  respiratory viral panel pending  BC NGTD  DVT US negative      DJD (degenerative joint disease), ankle and foot, unspecified laterality        Plantar fasciitis  Podiatry consult      Peroneal tendonitis, unspecified laterality        Acute on chronic anemia  Monitor hemoglobin hematocrit  Look for signs of acute blood loss      Right foot pain  Podiatry consulted- no acute intervention needed. Not acutely infected  Tylenol and ibuprofen for pain  CT scan negative for fracture.        Troponin level elevated  Troponin elevation in setting of volume overload and acute hypoxia  TTE completed  Trended to peak      Hypothyroidism  Continue synthroid      Bipolar 1 disorder  Continue risperidone      COPD (chronic obstructive pulmonary disease)  Possible in acute exacerbation- appears to have resolved- patient on RA  PRN treatments  Switch to PO abx for final day of abx  Sputum culture w/ normal respiratory agustina        VTE Risk Mitigation (From admission, onward)         Ordered     enoxaparin injection 40 mg   Daily         05/17/23 1308     IP VTE HIGH RISK PATIENT  Once         05/17/23 1308     Place sequential compression device  Until discontinued         05/17/23 1308                Discharge Planning   BEE:      Code Status: Full Code   Is the patient medically ready for discharge?:     Reason for patient still in hospital (select all that apply): Treatment  Discharge Plan A: Skilled Nursing Facility   Discharge Delays: None known at this time              Yariel Keane MD  Department of [facility] Medicine   [facility]

## 2023-05-24 NOTE — NURSING
Attempted to administer IV abx to patient. Patient states she wants her abx mixed in a different solution. Medication education attempted to be given. Attempted to calm and provide emotional support. Patient verbally abusive to staff. Nursing supervisor and MD notified and at bedside. Risks and benefits attempted to be explained when patient is willing to let staff attempt to communication.

## 2023-05-25 NOTE — PROGRESS NOTES
Also taking  mg PRN    C3 nurse spoke with Radha Alfred  for a TCC post hospital discharge follow up call. The patient has a scheduled HOSFU appointment with FRANSISCA Rodriguez on 05/31/2023 @ 1200.

## 2023-05-25 NOTE — TELEPHONE ENCOUNTER
----- Message from Meliza Dyer sent at 5/25/2023  8:11 AM CDT -----  Contact: pt  Pt is calling and would like a call back tomorrow she has some questions  Please give pt a call back [phone]

## 2023-05-28 NOTE — HOSPITAL COURSE
Patient admitted for acute hypoxic respiratory failure 2/2 acute on chronic HFrEF exacerbation requiring IV diuresis. Patient also with likely pulmonary infection. She was given IV lasix, abx, breathing treatments with improvement in symptoms. Not requiring o2 on day of discharge. TTE completed during admission that showed EF 45%. Attempted to start GMDT for patient but she declined to start any new medications and states she will start these with her cardiologist. PT/OT recommending SNF vs home with home health. Patient wants to go home with home health. She was switched to PO abx and PO lasix at time of discharge. Patient with stable microcytic anemia throughout admission. Has hx of DEE DEE. Due for C-scope. Needs this in the outpatient setting. She was provided discharge instructions and return precautions.

## 2023-05-28 NOTE — DISCHARGE SUMMARY
[facility] Medicine  Discharge Summary      Patient Name: Radha Alfred  MRN: [id number]  KARENA: [id number]  Patient Class: IP- Inpatient  Admission Date: 5/17/2023  Hospital Length of Stay: 5 days  Discharge Date and Time: 5/24/2023  6:00 PM  Attending Physician: No att. providers found   Discharging Provider: Yariel Keane MD  Primary Care Provider: FRANSISCA Rodriguez    Primary Care Team: Networked reference to record PCT     HPI:   Patient is 68 yo w/ hx of HF, COPD, Bipolar, Hypothyroidism presenting with acute worsening of SOB today while walking. Patient with 1 month hx of worsening SOB, Abdominal and LE swelling. She cannot lay flat. She is taking medications as prescribed. No fever, chills, cough, NVD. No chest pain.       * No surgery found *      Hospital Course:   Patient admitted for acute hypoxic respiratory failure 2/2 acute on chronic HFrEF exacerbation requiring IV diuresis. Patient also with likely pulmonary infection. She was given IV lasix, abx, breathing treatments with improvement in symptoms. Not requiring o2 on day of discharge. TTE completed during admission that showed EF 45%. Attempted to start GMDT for patient but she declined to start any new medications and states she will start these with her cardiologist. PT/OT recommending SNF vs home with home health. Patient wants to go home with home health. She was switched to PO abx and PO lasix at time of discharge. Patient with stable microcytic anemia throughout admission. Has hx of DEE DEE. Due for C-scope. Needs this in the outpatient setting. She was provided discharge instructions and return precautions.       Goals of Care Treatment Preferences:  Code Status: Full Code      Consults:     No new Assessment & Plan notes have been filed under this hospital service since the last note was generated.  Service: Hospital Medicine    Final Active Diagnoses:    Diagnosis Date Noted POA    PRINCIPAL PROBLEM:  Acute on  chronic heart failure [I50.9] 05/17/2023 Yes    Weakness [R53.1] 05/23/2023 Yes    Fever, unknown origin [R50.9] 05/22/2023 No    Peroneal tendonitis, unspecified laterality [M76.70] 05/21/2023 Yes    Plantar fasciitis [M72.2] 05/21/2023 Yes    DJD (degenerative joint disease), ankle and foot, unspecified laterality [M19.079] 05/21/2023 Yes    Right foot pain [M79.671] 05/20/2023 No    Acute on chronic anemia [D64.9] 05/20/2023 Yes    COPD (chronic obstructive pulmonary disease) [J44.9] 05/17/2023 Yes    Bipolar 1 disorder [F31.9] 05/17/2023 Yes    Hypothyroidism [E03.9] 05/17/2023 Yes    Troponin level elevated [R77.8] 05/17/2023 Yes      Problems Resolved During this Admission:       Discharged Condition: good    Disposition: Home or Self Care    Follow Up:   Follow-up Information       Dr Wild Calvo. Go on 5/29/2023.    Why: appointment Monday May 29th at 8:30am to get established with gastroenterologist  Contact information:  [address] [place] [address]  [phone]             [facility] Follow up.    Why: will provide home health services  Contact information:  [address] [address]  [phone]             FRANSISCA Rodriguez Follow up.    Specialty: Family Medicine  Contact information:  [address] [place] [address]  [phone]               FRANSISCA Rodriguez. Go on 5/31/2023.    Why: appointment Wednesday May 31st at 12:00pm for hospital follow up  Contact information:  [address] [place]  [phone]             Andrew Sloan DPM. Go on 6/5/2023.    Specialty: Podiatry  Why: appointment Monday June 5th at 1:30pm for podiatry follow up  Contact information:  [address]  [place] [address]                           Patient Instructions:      OXYGEN FOR HOME USE     Order Specific Question Answer Comments   Liter Flow 2    Duration Continuous    Qualifying Test Performed at: Activity 77  "  Oxygen saturation at rest 77    Oxygen saturation with activity 77    Oxygen saturation with activity on oxygen 88    Portable mode: continuous    Route nasal cannula    Device: home concentrator with portable tanks    Length of need (in months): 99 mos    Patient condition with qualifying saturation CHF    Height: 5' 2" (1.575 m)    Weight: 108.2 kg (238 lb 8.6 oz)    Alternative treatment measures have been tried or considered and deemed clinically ineffective. Yes      COMMODE FOR HOME USE     Order Specific Question Answer Comments   Type: Standard    Height: 5' 2" (1.575 m)    Weight: 106.6 kg (235 lb 0.2 oz)    Does patient have medical equipment at home? cane, straight    Does patient have medical equipment at home? walker, rolling    Does patient have medical equipment at home? grab bar    Length of need (1-99 months): 99      Ambulatory referral/consult to Home Health   Standing Status: Future   Referral Priority: Routine Referral Type: Home Health   Referral Reason: Specialty Services Required   Requested Specialty: Home Health Services   Number of Visits Requested: 1     Diet Cardiac     Notify your health care provider if you experience any of the following:  temperature >100.4     Notify your health care provider if you experience any of the following:  difficulty breathing or increased cough     Notify your health care provider if you experience any of the following:  severe uncontrolled pain     Activity as tolerated       Significant Diagnostic Studies:   Recent Results (from the past 168 hour(s))   Brain natriuretic peptide    Collection Time: 05/21/23  5:56 PM   Result Value Ref Range     (H) 0 - 99 pg/mL   CBC Auto Differential    Collection Time: 05/22/23  5:06 AM   Result Value Ref Range    WBC 12.06 3.90 - 12.70 K/uL    RBC 4.34 4.00 - 5.40 M/uL    Hemoglobin 7.8 (L) 12.0 - 16.0 g/dL    Hematocrit 29.3 (L) 37.0 - 48.5 %    MCV 68 (L) 82 - 98 fL    MCH 18.0 (L) 27.0 - 31.0 pg    MCHC " 26.6 (L) 32.0 - 36.0 g/dL    RDW 20.0 (H) 11.5 - 14.5 %    Platelets 260 150 - 450 K/uL    MPV 9.2 9.2 - 12.9 fL    Immature Granulocytes 0.5 0.0 - 0.5 %    Gran # (ANC) 8.6 (H) 1.8 - 7.7 K/uL    Immature Grans (Abs) 0.06 (H) 0.00 - 0.04 K/uL    Lymph # 2.3 1.0 - 4.8 K/uL    Mono # 0.9 0.3 - 1.0 K/uL    Eos # 0.2 0.0 - 0.5 K/uL    Baso # 0.05 0.00 - 0.20 K/uL    nRBC 0 0 /100 WBC    Gran % 71.4 38.0 - 73.0 %    Lymph % 18.7 18.0 - 48.0 %    Mono % 7.2 4.0 - 15.0 %    Eosinophil % 1.8 0.0 - 8.0 %    Basophil % 0.4 0.0 - 1.9 %    Differential Method Automated    Comprehensive Metabolic Panel    Collection Time: 05/22/23  5:06 AM   Result Value Ref Range    Sodium 139 136 - 145 mmol/L    Potassium 3.7 3.5 - 5.1 mmol/L    Chloride 93 (L) 95 - 110 mmol/L    CO2 34 (H) 23 - 29 mmol/L    Glucose 116 (H) 70 - 110 mg/dL    BUN 25 (H) 8 - 23 mg/dL    Creatinine 0.8 0.5 - 1.4 mg/dL    Calcium 9.3 8.7 - 10.5 mg/dL    Total Protein 6.9 6.0 - 8.4 g/dL    Albumin 3.1 (L) 3.5 - 5.2 g/dL    Total Bilirubin 1.5 (H) 0.1 - 1.0 mg/dL    Alkaline Phosphatase 77 55 - 135 U/L    AST 14 10 - 40 U/L    ALT 7 (L) 10 - 44 U/L    Anion Gap 12 8 - 16 mmol/L    eGFR >60.0 >60 mL/min/1.73 m^2   Magnesium    Collection Time: 05/22/23  5:06 AM   Result Value Ref Range    Magnesium 2.1 1.6 - 2.6 mg/dL   Phosphorus    Collection Time: 05/22/23  5:06 AM   Result Value Ref Range    Phosphorus 3.9 2.7 - 4.5 mg/dL   RESPIRATORY PATHOGENS PANEL (TEM-PCR) Nasopharyngeal    Collection Time: 05/22/23  9:25 AM   Result Value Ref Range    Respiratory Virus Panel, source nasopharyngeal     TEM - Acinetobacter baumannii Not Detected Not Detected    TEM - Bordetella pertussis Not Detected Not Detected    TEM - Chlamydophila pneumoniae Not Detected Not Detected    TEM- Haemophilus influenzae Not Detected Not Detected    TEM- Haemophilus influenzae B Not Detected Not Detected    TEM - Klebsiella pneumoniae Not Detected Not Detected    TEM - Legionella pneumophila  Not Detected Not Detected    TEM - Staphylococcus aureus Not Detected Not Detected    TEM - MRSA Not Detected Not Detected    TEM - Ernesto -Felix Not Detected Not Detected    TEM - Mycoplasma pneumoniae Not Detected Not Detected    TEM - Neisseria meningiditis Not Detected Not Detected    TEM - Pseudomonas aeruginosa Not Detected Not Detected    TEM - Streptococcus pneumoniae Not Detected Not Detected    TEM - Streptococcus pyogenes A Not Detected Not Detected    TEM - Moraxella catarrhalis Not Detected Not Detected    RVP - Adenovirus Not Detected Not Detected    Enterovirus/Rhinovirus Not Detected Not Detected    Human Bocavirus Not Detected Not Detected    Human Coronavirus, Common Cold Virus Not Detected Not Detected    RVP - Human Metapneumovirus (hMPV) Not Detected Not Detected    RVP - Influenza A Not Detected Not Detected    Influenza A - [id number] Not Detected Not Detected    RVP - Influenza B Not Detected Not Detected    Parainfluenza Not Detected Not Detected    Respiratory Syncytial VirusVirus (RSV) A Not Detected Not Detected   CBC Auto Differential    Collection Time: 05/23/23  5:34 AM   Result Value Ref Range    WBC 10.01 3.90 - 12.70 K/uL    RBC 4.31 4.00 - 5.40 M/uL    Hemoglobin 7.6 (L) 12.0 - 16.0 g/dL    Hematocrit 29.2 (L) 37.0 - 48.5 %    MCV 68 (L) 82 - 98 fL    MCH 17.6 (L) 27.0 - 31.0 pg    MCHC 26.0 (L) 32.0 - 36.0 g/dL    RDW 20.1 (H) 11.5 - 14.5 %    Platelets 278 150 - 450 K/uL    MPV 9.5 9.2 - 12.9 fL    Immature Granulocytes 0.2 0.0 - 0.5 %    Gran # (ANC) 6.6 1.8 - 7.7 K/uL    Immature Grans (Abs) 0.02 0.00 - 0.04 K/uL    Lymph # 2.3 1.0 - 4.8 K/uL    Mono # 0.7 0.3 - 1.0 K/uL    Eos # 0.4 0.0 - 0.5 K/uL    Baso # 0.04 0.00 - 0.20 K/uL    nRBC 0 0 /100 WBC    Gran % 66.0 38.0 - 73.0 %    Lymph % 22.8 18.0 - 48.0 %    Mono % 6.7 4.0 - 15.0 %    Eosinophil % 3.9 0.0 - 8.0 %    Basophil % 0.4 0.0 - 1.9 %    Differential Method Automated    Comprehensive Metabolic Panel    Collection  Time: 05/23/23  5:34 AM   Result Value Ref Range    Sodium 141 136 - 145 mmol/L    Potassium 3.5 3.5 - 5.1 mmol/L    Chloride 95 95 - 110 mmol/L    CO2 36 (H) 23 - 29 mmol/L    Glucose 113 (H) 70 - 110 mg/dL    BUN 27 (H) 8 - 23 mg/dL    Creatinine 0.8 0.5 - 1.4 mg/dL    Calcium 9.4 8.7 - 10.5 mg/dL    Total Protein 6.6 6.0 - 8.4 g/dL    Albumin 2.9 (L) 3.5 - 5.2 g/dL    Total Bilirubin 1.1 (H) 0.1 - 1.0 mg/dL    Alkaline Phosphatase 84 55 - 135 U/L    AST 16 10 - 40 U/L    ALT 8 (L) 10 - 44 U/L    Anion Gap 10 8 - 16 mmol/L    eGFR >60.0 >60 mL/min/1.73 m^2   Magnesium    Collection Time: 05/23/23  5:34 AM   Result Value Ref Range    Magnesium 2.4 1.6 - 2.6 mg/dL         Pending Diagnostic Studies:       None           Medications:  Reconciled Home Medications:      Medication List        CHANGE how you take these medications      albuterol sulfate 90 mcg/actuation Aebs  Inhale into the lungs.  What changed: Another medication with the same name was removed. Continue taking this medication, and follow the directions you see here.            CONTINUE taking these medications      furosemide 40 MG tablet  Commonly known as: LASIX  Take 1 tablet (40 mg total) by mouth 2 (two) times a day.     levothyroxine 100 MCG tablet  Commonly known as: SYNTHROID  Take 1 tablet (100 mcg total) by mouth once daily.     lovastatin 10 MG tablet  Commonly known as: MEVACOR  Take 1 tablet (10 mg total) by mouth every evening.     risperiDONE 2 MG tablet  Commonly known as: RISPERDAL  Take 2 mg by mouth every evening.            STOP taking these medications      ABILIFY 30 MG Tab  Generic drug: ARIPiprazole     lisinopriL 20 MG tablet  Commonly known as: PRINIVIL,ZESTRIL     potassium chloride 10 MEQ Tbsr  Commonly known as: KLOR-CON     TRUE METRIX AIR GLUCOSE METER Misc  Generic drug: blood-glucose meter     TRUE METRIX GLUCOSE TEST STRIP Strp  Generic drug: blood sugar diagnostic            ASK your doctor about these medications       amoxicillin-clavulanate 875-125mg 875-125 mg per tablet  Commonly known as: AUGMENTIN  Take 1 tablet by mouth 2 (two) times daily. for 1 day  Ask about: Should I take this medication?              Indwelling Lines/Drains at time of discharge:   Lines/Drains/Airways       None                   Time spent on the discharge of patient: 35 minutes         Yariel Keane MD  Department of Hospital Medicine  [facility]

## 2023-05-30 NOTE — PHYSICIAN QUERY
PT Name: Radha Alfred  MR #: [id number]     DOCUMENTATION CLARIFICATION      CDS/: Yudith Hernandez RN             Contact information:[email or web address]    This form is a permanent document in the medical record.     Query Date: May 30, 2023    Dear Provider,  By submitting this query, we are merely seeking further clarification of documentation.  Please utilize your independent clinical judgment when addressing the question(s) below.     The Medical Record contains the following:    Supporting Clinical Findings Location in Medical Record   COPD (chronic obstructive pulmonary disease)  Possible in acute exacerbation  Holding on steroids currently  Scheduled treatments  Continue rocephin for possible pneumonia  Sputum culture w/ normal respiratory agustina HM note 5-23   Impression:     No interval change in the radiographic appearance of the chest when compared to the previous study.  Radiographic findings which suggest cardiac decompensation/heart failure or volume overload    Chest xray 5-20             Please clarify if the Pneumonia diagnosis has been:    [  x] Ruled In   [  ] Ruled Out   [  ] Other/Clarification of findings (please specify): _______________     Please document in your progress notes daily for the duration of treatment, until resolved, and include in your discharge summary.    Form No. [id number]

## 2023-06-01 PROBLEM — I50.9 CHF (CONGESTIVE HEART FAILURE): Status: RESOLVED | Noted: 2023-01-01 | Resolved: 2023-01-01

## 2023-06-01 PROBLEM — I50.9 CHF (CONGESTIVE HEART FAILURE): Status: ACTIVE | Noted: 2023-01-01

## 2023-06-01 PROBLEM — I50.43 ACUTE ON CHRONIC COMBINED SYSTOLIC AND DIASTOLIC HEART FAILURE: Status: ACTIVE | Noted: 2023-01-01

## 2023-06-01 PROBLEM — D50.9 MICROCYTIC ANEMIA: Status: ACTIVE | Noted: 2023-01-01

## 2023-06-01 PROBLEM — F25.0 SCHIZOAFFECTIVE DISORDER, BIPOLAR TYPE: Status: ACTIVE | Noted: 2023-01-01

## 2023-06-01 PROBLEM — N30.00 ACUTE CYSTITIS WITHOUT HEMATURIA: Status: ACTIVE | Noted: 2023-01-01

## 2023-06-01 PROBLEM — E87.6 HYPOKALEMIA: Status: ACTIVE | Noted: 2023-01-01

## 2023-06-01 PROBLEM — J96.01 ACUTE RESPIRATORY FAILURE WITH HYPOXIA: Status: ACTIVE | Noted: 2023-01-01

## 2023-06-01 PROBLEM — E66.01 SEVERE OBESITY (BMI >= 40): Status: ACTIVE | Noted: 2023-01-01

## 2023-06-01 NOTE — PLAN OF CARE
Latest Reference Range & Units 06/01/23 18:07   Sample  ARTERIAL   POC PH 7.35 - 7.45  7.462 (H)   POC PCO2 35 - 45 mmHg 48.7 (H)   POC PO2 80 - 100 mmHg 44 (LL)   POC HCO3 24 - 28 mmol/L 34.8 (H)   POC SATURATED O2 95 - 100 % 81 (L)   Allens Test  N/A   POC BE -2 to 2 mmol/L 11   FiO2  21   [facility]  Room Air   Site  LB   Mode  SPONT   (LL): Data is critically low  (H): Data is abnormally high  (L): Data is abnormally low  Results reported to Dr. Gallo.

## 2023-06-01 NOTE — ED PROVIDER NOTES
"Encounter Date: 6/1/2023       History     Chief Complaint   Patient presents with    Altered Mental Status     EMS states patient has history of Schizophrenia and has not been taking her medications for two weeks.       The patient is a 67-year-old female sent to our facility for evaluation of confusion or altered mental status.  The patient has an extensive medical history to include schizophrenia, bipolar 1, CHF, COPD, hypertension, peripheral vascular disease, hypothyroidism, depression.  Diabetes has been listed on her past medical history but her niece states that she is not a diabetic.  The patient was recently admitted and discharged after a CHF exacerbation.  The patient reportedly has not been taking any of her medications since discharge.  Home health was arranged to help her with her medications but this has not been initiated as there was bed bugs at her house and this needed to be dealt with prior to home health coming to her house.    The patient comes us with hyperactivity and bizarre speech.  She does not have any hallucinations, suicidal ideations, homicidal ideations her she has been noncompliant with our ER evaluation.  Patient has no current complaints to include dyspnea, chest pain, abdominal pain.  Patient was noted to have oxygen saturations in the mid 80s on room air.  She denies any cough or wheezing.    Review of patient's allergies indicates:   Allergen Reactions    Cortisone      Other reaction(s): "breaks me out"    Iodine Hives    Iodine and iodide containing products      Past Medical History:   Diagnosis Date    Anxiety     Bipolar 1 disorder     Breast cancer     CHF (congestive heart failure)     COPD (chronic obstructive pulmonary disease)     Depression     Diabetes mellitus     Hypertension     PVD (peripheral vascular disease)     Schizophrenia     Thyroid disease      Past Surgical History:   Procedure Laterality Date    BREAST LUMPECTOMY      TONSILLECTOMY       Family " History   Problem Relation Age of Onset    Lung cancer Mother     Breast cancer Neg Hx      Social History     Tobacco Use    Smoking status: Former     Types: Cigarettes     Passive exposure: Never    Smokeless tobacco: Never   Substance Use Topics    Alcohol use: Not Currently    Drug use: Never     Review of Systems   Unable to perform ROS: Psychiatric disorder (Patient had no complaints on ROS)   All other systems reviewed and are negative.    Physical Exam     Initial Vitals [06/01/23 1539]   BP Pulse Resp Temp SpO2   (!) 140/31 91 14 98.6 °F (37 °C) (!) 90 %      MAP       --         Physical Exam    Nursing note and vitals reviewed.  Constitutional: She appears well-developed and well-nourished. She is not diaphoretic. No distress.   HENT:   Head: Normocephalic and atraumatic.   Nose: Nose normal.   Eyes: Conjunctivae and EOM are normal. Pupils are equal, round, and reactive to light. Right eye exhibits no discharge. Left eye exhibits no discharge. No scleral icterus.   Neck: Neck supple. No thyromegaly present.   Normal range of motion.  Cardiovascular:  Normal rate, regular rhythm, normal heart sounds and intact distal pulses.           No murmur heard.  Pulmonary/Chest: Breath sounds normal. No respiratory distress. She has no wheezes. She exhibits no tenderness.   Noted hypoxia on pulse ox.   Abdominal: Abdomen is soft. Bowel sounds are normal. She exhibits no distension and no mass. There is no abdominal tenderness. There is no rebound and no guarding.   Musculoskeletal:         General: Edema (BLE, 1-2+) present. No tenderness. Normal range of motion.      Cervical back: Normal range of motion and neck supple.     Lymphadenopathy:     She has no cervical adenopathy.   Neurological: She is alert and oriented to person, place, and time. She has normal strength. GCS eye subscore is 4. GCS verbal subscore is 5. GCS motor subscore is 6.   Patient was tangential in her speech and occasionally confused.  GCS  is ranging from 14-15.  Patient has no motor or sensory focal deficits.  Cranial nerves appear to be intact.  Patient was not compliant with exam however.   Skin: Skin is warm and dry. Capillary refill takes less than 2 seconds. No rash noted. No erythema.   Psychiatric:   Patient has hyperactive with dental thinking.  Her behavior is bizarre.  Her judgment is impaired.       ED Course   Procedures  Labs Reviewed   CBC W/ AUTO DIFFERENTIAL - Abnormal; Notable for the following components:       Result Value    Hemoglobin 8.1 (*)     Hematocrit 30.9 (*)     MCV 66 (*)     MCH 17.3 (*)     MCHC 26.2 (*)     RDW 21.3 (*)     Platelets 456 (*)     MPV 9.1 (*)     All other components within normal limits   COMPREHENSIVE METABOLIC PANEL - Abnormal; Notable for the following components:    Potassium 2.8 (*)     Chloride 94 (*)     CO2 32 (*)     Total Bilirubin 1.3 (*)     ALT 7 (*)     All other components within normal limits   B-TYPE NATRIURETIC PEPTIDE - Abnormal; Notable for the following components:     (*)     All other components within normal limits   TROPONIN I - Abnormal; Notable for the following components:    Troponin I 0.044 (*)     All other components within normal limits   URINALYSIS, REFLEX TO URINE CULTURE - Abnormal; Notable for the following components:    Occult Blood UA Trace (*)     Leukocytes, UA 3+ (*)     All other components within normal limits    Narrative:     Preferred Collection Type->Urine, Clean Catch  Specimen Source->Urine   URINALYSIS MICROSCOPIC - Abnormal; Notable for the following components:    WBC, UA 25 (*)     Bacteria Many (*)     All other components within normal limits    Narrative:     Preferred Collection Type->Urine, Clean Catch  Specimen Source->Urine   PHOSPHORUS - Abnormal; Notable for the following components:    Phosphorus 2.6 (*)     All other components within normal limits   ISTAT PROCEDURE - Abnormal; Notable for the following components:    POC PH 7.462  (*)     POC PCO2 48.7 (*)     POC PO2 44 (*)     POC HCO3 34.8 (*)     POC SATURATED O2 81 (*)     All other components within normal limits   CULTURE, URINE   MAGNESIUM   CK   DRUG SCREEN PANEL, URINE EMERGENCY    Narrative:     Specimen Source->Urine   PHOSPHORUS   HEMOGLOBIN A1C     EKG Readings: (Independently Interpreted)   EKG personally reviewed by me shows normal sinus rhythm with left axis deviation.  Possible LVH based on voltage, septal infarct of undetermined age.  81 beats per minute, NJ interval 186, .  No STEMI.     Imaging Results              X-Ray Chest AP Portable (Final result)  Result time 06/01/23 16:52:48      Final result by Najma Montana MD (06/01/23 16:52:48)                   Impression:      Chest appearing slightly improved compared to the prior exam suggesting improving CHF.      Electronically signed by: Najma Montana MD  Date:    06/01/2023  Time:    16:52               Narrative:    EXAMINATION:  XR CHEST AP PORTABLE    CLINICAL HISTORY:  Heart failure, unspecified    TECHNIQUE:  Single frontal view of the chest was performed.    COMPARISON:  05/21/2023    FINDINGS:  The heart is enlarged.  Primary pulmonary artery segment enlarged.  Prominent lung markings suggesting pulmonary vascular distention and mild perihilar infiltrate could also be present in the right infrahilar region.  Distribution is similar to the prior exam but overall appearing slightly less prominent today.  The cardiomediastinal silhouette appears stable.                                    X-Rays:   Independently Interpreted Readings:   Other Readings:  Chest x-ray personally reviewed by me shows bilateral signs of congestive heart failure, improved since previous.  Cardiomegaly.  Normal skeletal structures.  Medications   levothyroxine tablet 100 mcg (has no administration in time range)   atorvastatin tablet 40 mg (40 mg Oral Given 6/1/23 8436)   risperiDONE tablet 2 mg (2 mg Oral Not Given 6/1/23  2200)   sodium chloride 0.9% flush 10 mL (has no administration in time range)   enoxaparin injection 40 mg (40 mg Subcutaneous Given 6/1/23 2258)   furosemide injection 60 mg (has no administration in time range)   lisinopriL tablet 10 mg (10 mg Oral Not Given 6/1/23 2100)   carvediloL tablet 3.125 mg (has no administration in time range)   spironolactone tablet 25 mg (has no administration in time range)   ondansetron injection 4 mg (has no administration in time range)   polyethylene glycol packet 17 g (has no administration in time range)   senna-docusate 8.6-50 mg per tablet 1 tablet (has no administration in time range)   cefTRIAXone (ROCEPHIN) 1 g in dextrose 5 % in water (D5W) 5 % 50 mL IVPB (MB+) (0 g Intravenous Stopped 6/2/23 0132)   aspirin chewable tablet 81 mg (has no administration in time range)   albuterol nebulizer solution 2.5 mg (has no administration in time range)     And   ipratropium 0.02 % nebulizer solution 0.5 mg (has no administration in time range)   LORazepam injection 2 mg (2 mg Intramuscular Given 6/1/23 1747)   diphenhydrAMINE injection 50 mg (50 mg Intramuscular Given 6/1/23 1747)   haloperidol lactate injection 10 mg (10 mg Intramuscular Given 6/1/23 1747)   furosemide injection 80 mg (80 mg Intravenous Given 6/1/23 1927)   potassium chloride 10 mEq in 100 mL IVPB (10 mEq Intravenous New Bag 6/1/23 2301)   potassium chloride SA CR tablet 40 mEq (40 mEq Oral Given 6/1/23 2257)   aspirin tablet 325 mg (325 mg Oral Given 6/2/23 0022)     Medical Decision Making:   Differential Diagnosis:   Psychosis, medical noncompliance, CHF exacerbation, pneumonia  ED Management:  Patient was sent to our facility for evaluation of altered mental status.  Patient has been confused and acting bizarre which is consistent with her history of schizophrenia and bipolar disorder as well as her recent medical noncompliance.  Additionally, patient was found to be hypoxic with oxygen saturations in the 80s.   Patient has both COPD as well as CHF.  Patient was not compliant with exam and I discussed the matter with her caretaker, her niece.  I placed patient under 72 hour hold to obtain proper lab work.  Patient did require sedation with combination of Haldol, Ativan, Benadryl in order to sedate her enough to get our lab evaluation.  Labs that have return include ABG which do show hypoxia on room air.  Patient has been placed on supplemental oxygen.  Patient will likely need admission for further evaluation due to hypoxia.  Concern for CHF exacerbation.  Lasix will be administered.  Current workup is still pending and patient will be transition to Dr. Gallo for disposition.    Dr. Gallo:  Patient care assumed at shift change.  Patient was hypoxic, and ABG confirms low PO2.  Patient given 80 mg Lasix.  She is able to maintain 96% O2 saturation on 2 L delivered via nasal cannula.  Patient will need admission for further treatment of her apparent CHF exacerbation, and also for her mental status change, likely secondary to schizophrenia and noncompliance with medications for same.  A notify Dr. Case of patient's situation and need for admission and he agreed.                        Clinical Impression:   Final diagnoses:  [I50.9] CHF (congestive heart failure)        ED Disposition Condition    Observation Stable                Michael Gallo MD  06/01/23 2028       Michael Gallo MD  06/02/23 [id number]

## 2023-06-01 NOTE — PLAN OF CARE
Spoke with [facility] home health. Referral was sent upon previous inpatient discharge. Services were started, however due to patient having bed bugs in home, services have been put on hold until [facility] premises. According to [facility],  will come out Monday or Tuesday of next week. [facility] also stated that patient would benefit from psych nursing through [facility]. SW sent resume services referral to [facility] with request to transfer services to [facility] for psych nursing services.     SW to determine next step based on discharge time frame.

## 2023-06-01 NOTE — PLAN OF CARE
One attempt made at an ABG> Patient would not allow the needle to be redirected and said to remove the needle. She refused another stick. Dr. Reyes notified.

## 2023-06-01 NOTE — ED NOTES
Spoke with Tiffanie, patient's granddaughter, who states since patient got out of the hospital 2 weeks ago, patient has not been taking her medicine.  Tiffanie states that patient is hyperactive, talking nonsense or talking to herself, calling multiple people, and not acting like herself.  Samantha states that patient lives alone at [facility].  She states that patient's daughter(Tiffanie's mother) recently  and she was the patient's main support.  Tiffanie asked to be contacted at [phone] with any updates.

## 2023-06-02 NOTE — ASSESSMENT & PLAN NOTE
She was highly confused, agitated and manic in the ED  She had to get Ativan, benadryl and Haldol to finally calm down  She is more lucid and cooperative now  She had PEC placed in the ED  Continue home Risperdal 2mg  She is non compliant with her meds per history

## 2023-06-02 NOTE — PLAN OF CARE
Step 1: Admit - Current ([facility] - IP GENERAL HEART FAILURE - [facility])  Nurse: Patient education provided (located in [employer])  Outcome: Met  Nurse: Standing weight recorded within 1 hour of arrival to floor  Outcome: Not Met  Variance Physical/mental limitations  Comment: Weight performed on bed scale due to patient being unwilling to cooperate during admission.  Nurse: Urine output recorded (4 hrs after first lasix dose/750mL)  Outcome: Met     Problem: Self-Care Deficit  Goal: Improved Ability to Complete Activities of Daily Living  Outcome: Ongoing, Progressing  Intervention: Promote Activity and Functional [place]  Flowsheets (Taken 6/2/2023 1719)  Self-Care Promotion:   independence encouraged   BADL personal objects within reach  Activity Assistance Provided: assistance, stand-by  Adaptive Equipment Use: (Walker) used independently     Problem: Manic Behavior Episode  Goal: Decreased Manic Symptoms  Outcome: Ongoing, Progressing  Intervention: Promote Mood Equilibrium  Flowsheets (Taken 6/2/2023 1719)  Behavior Management:   boundaries reinforced   impulse control promoted  Sensory Stimulation Regulation:   auditory stimulation minimized   care clustered   quiet environment promoted   television on  Supportive Measures:   active listening utilized   decision-making supported   self-reflection promoted   self-responsibility promoted     Problem: Gas Exchange Impaired  Goal: Optimal Gas Exchange  Outcome: Ongoing, Progressing  Intervention: Optimize Oxygenation and Ventilation  Flowsheets (Taken 6/2/2023 1719)  Airway/Ventilation Management: airway patency maintained  Head of Bed (HOB) Positioning: HOB elevated     Plan of care reviewed with pt. EVA throughout shift. Low sodium diet. Continue to Monitor Labs. VSS.  Safety maintained. Up to restroom with walker. Purewick removed; incontinence pads provided. Patient refused Lasix and Potassium. Will continue to monitor. No complaints at this time.

## 2023-06-02 NOTE — CARE UPDATE
06/02/23 0940 06/02/23 0941 06/02/23 0942   Vital Signs   Pulse 84  --  85   Resp 18  --  20   SpO2 (!) 86 % (!) 89 % (!) 92 %   Flow (L/min)  --  1 1   Oxygen Concentration (%)  --  24 24   Device (Oxygen Therapy) room air nasal cannula nasal cannula     Patient attempted to be weaned to room air. Patient desaturated and was placed back on 1 lpm.

## 2023-06-02 NOTE — NURSING
"Discussed with patient the importance of taking Lasix for CHF and Potassium for depleted potassium due to urination on Lasix. Patient initially agreed to take Lasix and potassium during afternoon medication round. Scanned patient and medications, including carvedilol, and handed to patient to take. Patient took a few of the pills and handed the remaining back to me. The remaining pills were potassium and carvedilol. Again explained importance of potassium with Lasix (which patient did take). Patient declined taking potassium. Patient also declined taking carvedilol and stated, "I'm not taking that. Good luck."  "

## 2023-06-02 NOTE — ASSESSMENT & PLAN NOTE
Patient is identified as having Combined Systolic and Diastolic heart failure that is Acute on chronic. CHF is currently uncontrolled due to Continued edema of extremities, Dyspnea not returned to baseline after 1 doses of IV diuretic and Pulmonary edema/pleural effusion on CXR. Latest ECHO performed and demonstrates- Results for orders placed during the hospital encounter of 05/17/23    Echo    Interpretation Summary  · The left ventricle is mildly enlarged with concentric hypertrophy and mildly decreased systolic function.  · The estimated ejection fraction is 45%.  · Left ventricular diastolic dysfunction.  · Mild right ventricular enlargement.  · Normal central venous pressure (3 mmHg).  · The estimated PA systolic pressure is 45 mmHg.  · Small pericardial effusion.  · There is moderate left ventricular global hypokinesis.  · Moderate left atrial enlargement.       Continue Beta Blocker, ACE/ARB, Furosemide and Aldactone and monitor clinical status closely. Monitor on telemetry. Patient is on CHF pathway.  Monitor strict Is&Os and daily weights.  Place on fluid restriction of 1 L. Continue to stress to patient importance of self efficacy and  on diet for CHF. Last BNP reviewed- and noted below   Recent Labs   Lab 06/01/23  1820   *            [START ON 6/2/2023] carvediloL tablet 3.125 mg, 3.125 mg, Oral, BID WM     [START ON 6/2/2023] furosemide injection 60 mg, 60 mg, Intravenous, Q12H     lisinopriL tablet 10 mg, 10 mg, Oral, Daily     [START ON 6/2/2023] spironolactone tablet 25 mg, 25 mg, Oral, Daily

## 2023-06-02 NOTE — PROGRESS NOTES
"[facility]  Progress Note    Patient Name: Radha Alfred  MRN: [id number]  Patient Class: OP- Observation   Admission Date: 6/1/2023  Length of Stay: 0 days  Attending Physician: Yariel Keane MD  Primary Care Provider: FRANSISCA Rodriguez        Subjective:     Principal Problem:Acute on chronic combined systolic and diastolic heart failure        HPI:  Ms. Alfred is a 68yo lady with a past medical history of breast cancer, schizophrenia, hypothyroidism, HTN, DM2, COPD, CHF, and bipolar 1.  His last TTE was 5/17/23 and showed an EF of 45%, diastolic dysfunction, PAP 45 mmHg and mild RV enlargement.    She was recently admitted to [facility] on 5/17/23 to 5/24/23 with CHF exacerbation with hypoxic respiratory failure.  She refused SNF at discharge and took none of her prescribed medications after she went home.  She was not requiring any supplemental O2 at discharge.    Ms. Alfred now comes to the ED after her daughter found her confused with bizarre behavior at home.  When asked why she came to the hospital, she tells me, "because I had to pee."  She has no recollection of the day's events and remains a little withdrawn and confused.  When she got to the ED, she was tangential, agitated and very confused, requiring multiple dose of psychotropic meds (see below) to get her to cooperate with labs and radiographs.  She was PEC'd.  She is able to tell me she has no CP or SOB, but then just states, "I want to go home."      VS in the ED were /63 -> 125/63 (BP Location: Right arm, Patient Position: Lying)   Pulse 78   Temp 98.6 °F (37 °C) (Oral)   Resp 18   Ht 5' 2" (1.575 m)   Wt 99.8 kg (220 lb)   SpO2 90 -> 88 -> 81% RA -> 97% 2L NC  Breastfeeding No   BMI 40.24 kg/m².  Labs showed Hg 8.1, MCV 66, K 2.8, Cr 0.8, TB 1.3, , Trop 0.044, UA: nitrite negative, LE 3+, WBC 25, many bacteria.    ABG RA: pH 7.46, PCO2 49, PAO2 44, sat 81%.    CXR showed the heart is " enlarged.  Primary pulmonary artery segment enlarged.  Prominent lung markings suggesting pulmonary vascular distention and mild perihilar infiltrate could also be present in the right infrahilar region.  Distribution is similar to the prior exam but overall appearing slightly less prominent today.  The cardiomediastinal silhouette appears stable.  Impression:  Chest appearing slightly improved compared to the prior exam suggesting improving CHF.    In the ED she was treated with:  Medications   LORazepam injection 2 mg (2 mg Intramuscular Given 6/1/23 1747)   diphenhydrAMINE injection 50 mg (50 mg Intramuscular Given 6/1/23 1747)   haloperidol lactate injection 10 mg (10 mg Intramuscular Given 6/1/23 1747)   furosemide injection 80 mg (80 mg Intravenous Given 6/1/23 1927)               Overview/Hospital Course:  No notes on file    Interval History: patient has returned to baseline mental status, still requiring supplemental oxygen to maintain sats >94%    Review of Systems  Objective:     Vital Signs (Most Recent):  Temp: 97.7 °F (36.5 °C) (06/02/23 1115)  Pulse: 74 (06/02/23 1344)  Resp: 20 (06/02/23 1344)  BP: (!) 116/54 (06/02/23 1115)  SpO2: 95 % (06/02/23 1344) Vital Signs (24h Range):  Temp:  [97.6 °F (36.4 °C)-98.6 °F (37 °C)] 97.7 °F (36.5 °C)  Pulse:  [] 74  Resp:  [14-75] 20  SpO2:  [81 %-100 %] 95 %  BP: (116-145)/(31-63) 116/54     Weight: 102.2 kg (225 lb 5 oz)  Body mass index is 41.21 kg/m².    Intake/Output Summary (Last 24 hours) at 6/2/2023 1353  Last data filed at 6/2/2023 1012  Gross per 24 hour   Intake 240 ml   Output --   Net 240 ml         Physical Exam  Vitals reviewed.   Constitutional:       General: She is not in acute distress.     Appearance: She is not toxic-appearing or diaphoretic.   Pulmonary:      Breath sounds: No wheezing.      Comments: Increased work of breathing, conversational dyspnea, bibasilar crackles  Musculoskeletal:      Right lower leg: Edema present.      Left  lower leg: Edema present.   Skin:     General: Skin is warm.   Neurological:      Mental Status: She is alert.      Comments: Oriented and responding to questions   Psychiatric:      Comments: Labile mood, noncooperative, refusing some aspects of care           Significant Labs: mild hypokalemia at 3.5       Assessment/Plan:      * Acute on chronic combined systolic and diastolic heart failure  Patient is identified as having Combined Systolic and Diastolic heart failure that is Acute on chronic. CHF is currently uncontrolled due to Continued edema of extremities, Dyspnea not returned to baseline after 1 doses of IV diuretic and Pulmonary edema/pleural effusion on CXR. Latest ECHO performed and demonstrates- Results for orders placed during the hospital encounter of 05/17/23    Echo    Interpretation Summary  · The left ventricle is mildly enlarged with concentric hypertrophy and mildly decreased systolic function.  · The estimated ejection fraction is 45%.  · Left ventricular diastolic dysfunction.  · Mild right ventricular enlargement.  · Normal central venous pressure (3 mmHg).  · The estimated PA systolic pressure is 45 mmHg.  · Small pericardial effusion.  · There is moderate left ventricular global hypokinesis.  · Moderate left atrial enlargement.       Continue Beta Blocker, ACE/ARB, Furosemide and Aldactone and monitor clinical status closely. Monitor on telemetry. Patient is on CHF pathway.  Monitor strict Is&Os and daily weights.  Place on fluid restriction of 1 L. Continue to stress to patient importance of self efficacy and  on diet for CHF. Last BNP reviewed- and noted below   Recent Labs   Lab 06/01/23  1820   *            [START ON 6/2/2023] carvediloL tablet 3.125 mg, 3.125 mg, Oral, BID WM     [START ON 6/2/2023] furosemide injection 60 mg, 60 mg, Intravenous, Q12H     lisinopriL tablet 10 mg, 10 mg, Oral, Daily     [START ON 6/2/2023] spironolactone tablet 25 mg, 25 mg, Oral,  Daily       IV lasix held due to hypokalemia, replacing potassium and continuing IV diuresis        Acute respiratory failure with hypoxia  Patient with Hypoxic Respiratory failure which is Acute.  She is not on home oxygen. Supplemental oxygen was provided and noted improvement in sats as per below.      Signs/symptoms of respiratory failure include- tachypnea and increased work of breathing. Contributing diagnoses includes - CHF Labs and images were reviewed. Patient Has recent ABG, which has been reviewed. Will treat underlying causes and adjust management of respiratory failure as follows:    ABG RA: pH 7.46, PCO2 49, PAO2 44, sat 81%   SpO2 90 -> 88 -> 81% RA -> 97% 2L NC     Attempted to wean O2 today but sats dropped to 86%, oxygen replaced      Severe obesity (BMI >= 40)  Encourage exercise, weight loss and healthy diet      Acute cystitis without hematuria  Continue rocephin for now, f/u cxs     Latest Reference Range & Units 06/01/23 16:41   NITRITE UA Negative  Negative   UROBILINOGEN UA Negative EU/dL 1.0   Bilirubin (UA) Negative  Negative   Leukocytes, UA Negative  3+ !   RBC, UA 0 - 4 /hpf 2   WBC, UA 0 - 5 /hpf 25 (H)   Bacteria, UA None-Occ /hpf Many !          Hypokalemia  KCl 10 meq iv x 1  KCl 40 meq po x 1  Follow daily  Mg 2.1  Continue replacement PO      Microcytic anemia  Follow up with GI for scopes  Check B12, folate, Fe studies  Start PO Fe for iron deficiency     Latest Reference Range & Units 05/23/23 05:34 06/01/23 18:20   Hemoglobin 12.0 - 16.0 g/dL 7.6 (L) 8.1 (L)   Hematocrit 37.0 - 48.5 % 29.2 (L) 30.9 (L)   MCV 82 - 98 fL 68 (L) 66 (L)          Elevated troponin level not due to acute coronary syndrome  This is more representative of type II strain ischemia from CHF  Will trend out to be sure  EKG with no acute ischemic changes         [START ON 6/2/2023] aspirin chewable tablet 81 mg, 81 mg, Oral, QHS     [START ON 6/2/2023] aspirin tablet 325 mg, 325 mg, Oral, Once      atorvastatin tablet 40 mg, 40 mg, Oral, QHS     [START ON 6/2/2023] carvediloL tablet 3.125 mg, 3.125 mg, Oral, BID WM         Hypothyroidism  Continue home Synthroid 100mcg po qday  Repeat thyroid panels      Schizoaffective disorder, bipolar type  She was highly confused, agitated and manic in the ED  She had to get Ativan, benadryl and Haldol to finally calm down  She is more lucid and cooperative now  She had PEC placed in the ED, discontinued now  Continue home Risperdal 2mg  She is non compliant with her meds per history  Returned to baseline mental status      COPD (chronic obstructive pulmonary disease)  Duonebs q4 prn wheezing and SOB  aerobika       VTE Risk Mitigation (From admission, onward)         Ordered     enoxaparin injection 40 mg  Every 12 hours         06/01/23 2056     Place JAMES hose  Until discontinued         06/01/23 2056     IP VTE HIGH RISK PATIENT  Once         06/01/23 2056     Place sequential compression device  Until discontinued         06/01/23 2056                Discharge Planning   BEE:      Code Status: Full Code   Is the patient medically ready for discharge?:     Reason for patient still in hospital (select all that apply): Treatment  Discharge Plan A: Home Health                  Shonda Argueta MD  Department of Hospital Medicine   [facility]ensive [facility]

## 2023-06-02 NOTE — PLAN OF CARE
06/02/23 1225   FOWLER Message   Medicare Outpatient and Observation Notification regarding financial responsibility Explained to patient/caregiver;Signed/date by patient/caregiver   Date FOWLER was signed 06/02/23   Time FOWLER was signed 1227

## 2023-06-02 NOTE — ASSESSMENT & PLAN NOTE
Her UA is borderline equivocal  Started Rocephin 1g iv q24 hours  Follow up CXS  No SIRS     Latest Reference Range & Units 06/01/23 16:41   NITRITE UA Negative  Negative   UROBILINOGEN UA Negative EU/dL 1.0   Bilirubin (UA) Negative  Negative   Leukocytes, UA Negative  3+ !   RBC, UA 0 - 4 /hpf 2   WBC, UA 0 - 5 /hpf 25 (H)   Bacteria, UA None-Occ /hpf Many !

## 2023-06-02 NOTE — ASSESSMENT & PLAN NOTE
Follow up with GI for scopes  Check B12, folate, Fe studies  Consider IV Fe or PO Fe depending on how low     Latest Reference Range & Units 05/23/23 05:34 06/01/23 18:20   Hemoglobin 12.0 - 16.0 g/dL 7.6 (L) 8.1 (L)   Hematocrit 37.0 - 48.5 % 29.2 (L) 30.9 (L)   MCV 82 - 98 fL 68 (L) 66 (L)

## 2023-06-02 NOTE — ASSESSMENT & PLAN NOTE
Patient with Hypoxic Respiratory failure which is Acute.  She is not on home oxygen. Supplemental oxygen was provided and noted improvement in sats as per below.      Signs/symptoms of respiratory failure include- tachypnea and increased work of breathing. Contributing diagnoses includes - CHF Labs and images were reviewed. Patient Has recent ABG, which has been reviewed. Will treat underlying causes and adjust management of respiratory failure as follows:    ABG RA: pH 7.46, PCO2 49, PAO2 44, sat 81%   SpO2 90 -> 88 -> 81% RA -> 97% 2L NC     Attempted to wean O2 today but sats dropped to 86%, oxygen replaced

## 2023-06-02 NOTE — SUBJECTIVE & OBJECTIVE
Interval History: patient has returned to baseline mental status, still requiring supplemental oxygen to maintain sats >94%    Review of Systems  Objective:     Vital Signs (Most Recent):  Temp: 97.7 °F (36.5 °C) (06/02/23 1115)  Pulse: 74 (06/02/23 1344)  Resp: 20 (06/02/23 1344)  BP: (!) 116/54 (06/02/23 1115)  SpO2: 95 % (06/02/23 1344) Vital Signs (24h Range):  Temp:  [97.6 °F (36.4 °C)-98.6 °F (37 °C)] 97.7 °F (36.5 °C)  Pulse:  [] 74  Resp:  [14-75] 20  SpO2:  [81 %-100 %] 95 %  BP: (116-145)/(31-63) 116/54     Weight: 102.2 kg (225 lb 5 oz)  Body mass index is 41.21 kg/m².    Intake/Output Summary (Last 24 hours) at 6/2/2023 1353  Last data filed at 6/2/2023 1012  Gross per 24 hour   Intake 240 ml   Output --   Net 240 ml         Physical Exam  Vitals reviewed.   Constitutional:       General: She is not in acute distress.     Appearance: She is not toxic-appearing or diaphoretic.   Pulmonary:      Breath sounds: No wheezing.      Comments: Increased work of breathing, conversational dyspnea, bibasilar crackles  Musculoskeletal:      Right lower leg: Edema present.      Left lower leg: Edema present.   Skin:     General: Skin is warm.   Neurological:      Mental Status: She is alert.      Comments: Oriented and responding to questions   Psychiatric:      Comments: Labile mood, noncooperative, refusing some aspects of care           Significant Labs: mild hypokalemia at 3.5

## 2023-06-02 NOTE — ASSESSMENT & PLAN NOTE
Patient is identified as having Combined Systolic and Diastolic heart failure that is Acute on chronic. CHF is currently uncontrolled due to Continued edema of extremities, Dyspnea not returned to baseline after 1 doses of IV diuretic and Pulmonary edema/pleural effusion on CXR. Latest ECHO performed and demonstrates- Results for orders placed during the hospital encounter of 05/17/23    Echo    Interpretation Summary  · The left ventricle is mildly enlarged with concentric hypertrophy and mildly decreased systolic function.  · The estimated ejection fraction is 45%.  · Left ventricular diastolic dysfunction.  · Mild right ventricular enlargement.  · Normal central venous pressure (3 mmHg).  · The estimated PA systolic pressure is 45 mmHg.  · Small pericardial effusion.  · There is moderate left ventricular global hypokinesis.  · Moderate left atrial enlargement.       Continue Beta Blocker, ACE/ARB, Furosemide and Aldactone and monitor clinical status closely. Monitor on telemetry. Patient is on CHF pathway.  Monitor strict Is&Os and daily weights.  Place on fluid restriction of 1 L. Continue to stress to patient importance of self efficacy and  on diet for CHF. Last BNP reviewed- and noted below   Recent Labs   Lab 06/01/23  1820   *            [START ON 6/2/2023] carvediloL tablet 3.125 mg, 3.125 mg, Oral, BID WM     [START ON 6/2/2023] furosemide injection 60 mg, 60 mg, Intravenous, Q12H     lisinopriL tablet 10 mg, 10 mg, Oral, Daily     [START ON 6/2/2023] spironolactone tablet 25 mg, 25 mg, Oral, Daily       IV lasix held due to hypokalemia, replacing potassium and continuing IV diuresis

## 2023-06-02 NOTE — HPI
"Ms. Alfred is a 66yo lady with a past medical history of breast cancer, schizophrenia, hypothyroidism, HTN, DM2, COPD, CHF, and bipolar 1.  His last TTE was 5/17/23 and showed an EF of 45%, diastolic dysfunction, PAP 45 mmHg and mild RV enlargement.    She was recently admitted to [facility] on 5/17/23 to 5/24/23 with CHF exacerbation with hypoxic respiratory failure.  She refused SNF at discharge and took none of her prescribed medications after she went home.  She was not requiring any supplemental O2 at discharge.    Ms. Alfred now comes to the ED after her daughter found her confused with bizarre behavior at home.  When asked why she came to the hospital, she tells me, "because I had to pee."  She has no recollection of the day's events and remains a little withdrawn and confused.  When she got to the ED, she was tangential, agitated and very confused, requiring multiple dose of psychotropic meds (see below) to get her to cooperate with labs and radiographs.  She was PEC'd.  She is able to tell me she has no CP or SOB, but then just states, "I want to go home."      VS in the ED were /63 -> 125/63 (BP Location: Right arm, Patient Position: Lying)   Pulse 78   Temp 98.6 °F (37 °C) (Oral)   Resp 18   Ht 5' 2" (1.575 m)   Wt 99.8 kg (220 lb)   SpO2 90 -> 88 -> 81% RA -> 97% 2L NC  Breastfeeding No   BMI 40.24 kg/m².  Labs showed Hg 8.1, MCV 66, K 2.8, Cr 0.8, TB 1.3, , Trop 0.044, UA: nitrite negative, LE 3+, WBC 25, many bacteria.    ABG RA: pH 7.46, PCO2 49, PAO2 44, sat 81%.    CXR showed the heart is enlarged.  Primary pulmonary artery segment enlarged.  Prominent lung markings suggesting pulmonary vascular distention and mild perihilar infiltrate could also be present in the right infrahilar region.  Distribution is similar to the prior exam but overall appearing slightly less prominent today.  The cardiomediastinal silhouette appears stable.  Impression:  Chest appearing slightly improved " compared to the prior exam suggesting improving CHF.    In the ED she was treated with:  Medications   LORazepam injection 2 mg (2 mg Intramuscular Given 6/1/23 1747)   diphenhydrAMINE injection 50 mg (50 mg Intramuscular Given 6/1/23 1747)   haloperidol lactate injection 10 mg (10 mg Intramuscular Given 6/1/23 1747)   furosemide injection 80 mg (80 mg Intravenous Given 6/1/23 1927)

## 2023-06-02 NOTE — ASSESSMENT & PLAN NOTE
She was highly confused, agitated and manic in the ED  She had to get Ativan, benadryl and Haldol to finally calm down  She is more lucid and cooperative now  She had PEC placed in the ED, discontinued now  Continue home Risperdal 2mg  She is non compliant with her meds per history  Returned to baseline mental status

## 2023-06-02 NOTE — PLAN OF CARE
[facility] Intensive Care  Initial Discharge Assessment       Primary Care Provider: FRANSISCA Rodriguez    Admission Diagnosis: CHF (congestive heart failure) [I50.9]    Admission Date: 6/1/2023  Expected Discharge Date:   Assessment completed with patient and with nieceTiffanie [phone] via phone. Patient lives alone at [facility]. She uses a straight cane and a rollator at home. Patient does not drive and depends on medicare transportation and others to assist with transportation. Patient usually pays someone to run errands and shop as well as clean her house when she needs assistance. Her PCP is FRANSISCA Rosario and she also sees a cardiologist, Dr. Benitez of [facility]. Her pharmacy of choice is [facility]. Patient has home health services with [facility] but is being transferred to [facility] to have psych nursing. She does not participate in outside therapies. Patient's apartment currently has bed bugs and will not be fumigated until Monday/Tuesday of next week (6/12-6/13). Patient's niece is contacting [place] to discuss patient staying in a different apartment until hers is ready. Niece stated that she does not have room for patient to stay with her. Patient's niece is worried about her psychiatric medications and if she is being/taking them as prescribed.  Patient denies any additional needs at this time. Case management will continue to follow.  Transition of Care Barriers: None    Payor: [employer] / Plan: [employer] MEDICARE HMO / Product Type: Medicare Advantage /     Extended Emergency Contact Information  Primary Emergency Contact: Tiffanie Garcias  Mobile Phone: [phone]  Relation: Relative  Preferred language: English   needed? No    Discharge Plan A: Home Health  Discharge Plan B: Skilled Nursing Facility      [id number] [place] [facility] [address]  [address]  [place] [address]  Phone: [phone] Fax:  [phone]      Initial Assessment (most recent)       Adult Discharge Assessment - 06/02/23 1212          Discharge Assessment    Assessment Type Discharge Planning Assessment     Confirmed/corrected address, phone number and insurance Yes     Confirmed Demographics Correct on Facesheet     Source of Information family     Does patient/caregiver understand observation status Yes     Communicated BEE with patient/caregiver Date not available/Unable to determine     Reason For Admission congestive heart failure     People in Home alone     Do you expect to return to your current living situation? Yes     Do you have help at home or someone to help you manage your care at home? No   patient pays for help with household chores when needed    Equipment Currently Used at Home cane, straight;grab bar;walker, rolling     Readmission within 30 days? Yes     Patient currently being followed by outpatient case management? Yes     If yes, name of outpatient case management following: insurance company assigned oupatient case management     Do you currently have service(s) that help you manage your care at home? Yes     Name and Contact number of agency [facility]- being transferred to [facility] for psych nursing     Is the pt/caregiver preference to resume services with current agency Yes     Do you take prescription medications? Yes     Do you have prescription coverage? Yes     Do you have any problems affording any of your prescribed medications? No     How do you get to doctors appointments? health plan transportation     Are you on dialysis? No     Do you take coumadin? No     Discharge Plan A Home Health     Discharge Plan B Skilled Nursing Facility     DME Needed Upon Discharge  none     Discharge Plan discussed with: --   Tiffanie mir [phone]    Transition of Care Barriers None        Physical Activity    On average, how many days per week do you engage in moderate to strenuous exercise (like a brisk  walk)? 1 day     On average, how many minutes do you engage in exercise at this level? 10 min        Financial Resource Strain    How hard is it for you to pay for the very basics like food, housing, medical care, and heating? Not hard at all        Housing Stability    In the last 12 months, was there a time when you were not able to pay the mortgage or rent on time? No     In the last 12 months, was there a time when you did not have a steady place to sleep or slept in a shelter (including now)? No        Transportation Needs    In the past 12 months, has lack of transportation kept you from medical appointments or from getting medications? No     In the past 12 months, has lack of transportation kept you from meetings, work, or from getting things needed for daily living? No        Food Insecurity    Within the past 12 months, you worried that your food would run out before you got the money to buy more. Never true     Within the past 12 months, the food you bought just didn't last and you didn't have money to get more. Never true        Stress    Do you feel stress - tense, restless, nervous, or anxious, or unable to sleep at night because your mind is troubled all the time - these days? Not at all        Social Connections    In a typical week, how many times do you talk on the phone with family, friends, or neighbors? More than three times a week     How often do you attend Episcopalian or Jewish services? More than 4 times per year     Do you belong to any clubs or organizations such as Episcopalian groups, unions, fraternal or athletic groups, or school groups? No     How often do you attend meetings of the clubs or organizations you belong to? Never     Are you , , , , never , or living with a partner?         Alcohol Use    Q1: How often do you have a drink containing alcohol? Never     Q2: How many drinks containing alcohol do you have on a typical day when you are  drinking? Patient does not drink     Q3: How often do you have six or more drinks on one occasion? Never

## 2023-06-02 NOTE — SUBJECTIVE & OBJECTIVE
"Past Medical History:   Diagnosis Date    Anxiety     Bipolar 1 disorder     Breast cancer     CHF (congestive heart failure)     COPD (chronic obstructive pulmonary disease)     Depression     Diabetes mellitus     Hypertension     PVD (peripheral vascular disease)     Schizophrenia     Thyroid disease        Past Surgical History:   Procedure Laterality Date    BREAST LUMPECTOMY      TONSILLECTOMY         Review of patient's allergies indicates:   Allergen Reactions    Cortisone      Other reaction(s): "breaks me out"    Iodine Hives    Iodine and iodide containing products        No current facility-administered medications on file prior to encounter.     Current Outpatient Medications on File Prior to Encounter   Medication Sig    albuterol sulfate 90 mcg/actuation aebs Inhale into the lungs.    furosemide (LASIX) 40 MG tablet Take 1 tablet (40 mg total) by mouth 2 (two) times a day.    levothyroxine (SYNTHROID) 100 MCG tablet Take 1 tablet (100 mcg total) by mouth once daily.    lovastatin (MEVACOR) 10 MG tablet Take 1 tablet (10 mg total) by mouth every evening.    risperiDONE (RISPERDAL) 2 MG tablet Take 2 mg by mouth every evening.     Family History       Problem Relation (Age of Onset)    Lung cancer Mother          Tobacco Use    Smoking status: Former     Types: Cigarettes     Passive exposure: Never    Smokeless tobacco: Never   Substance and Sexual Activity    Alcohol use: Not Currently    Drug use: Never    Sexual activity: Not Currently     Birth control/protection: Post-menopausal     Review of Systems   Unable to perform ROS: Psychiatric disorder   Objective:     Vital Signs (Most Recent):  Temp: 98.6 °F (37 °C) (06/01/23 1539)  Pulse: 78 (06/01/23 2037)  Resp: 18 (06/01/23 2037)  BP: 125/63 (06/01/23 2037)  SpO2: 97 % (06/01/23 2037) Vital Signs (24h Range):  Temp:  [98.6 °F (37 °C)] 98.6 °F (37 °C)  Pulse:  [] 78  Resp:  [14-24] 18  SpO2:  [81 %-100 %] 97 %  BP: (125-145)/(31-63) 125/63 "     Weight: 99.8 kg (220 lb)  Body mass index is 40.24 kg/m².     Physical Exam  Constitutional:       General: She is not in acute distress.     Appearance: She is morbidly obese. She is not ill-appearing, toxic-appearing or diaphoretic.   HENT:      Head: Normocephalic and atraumatic.   Pulmonary:      Effort: No tachypnea or bradypnea.   Genitourinary:     Comments: No cuellar in place  Neurological:      Mental Status: She is lethargic and disoriented.      GCS: GCS eye subscore is 4. GCS verbal subscore is 5. GCS motor subscore is 6.   Psychiatric:         Attention and Perception: She is inattentive.         Mood and Affect: Mood is depressed.         Speech: Speech is delayed and tangential.         Behavior: Behavior is withdrawn.         Cognition and Memory: Cognition is impaired. Memory is impaired. She exhibits impaired recent memory and impaired remote memory.              Significant Labs: All pertinent labs within the past 24 hours have been reviewed.  Recent Results (from the past 24 hour(s))   Urinalysis, Reflex to Urine Culture Urine, Clean Catch    Collection Time: 06/01/23  4:41 PM    Specimen: Urine   Result Value Ref Range    Specimen UA Urine, Unspecified     Color, UA Yellow Yellow, Straw, Saranya    Appearance, UA Clear Clear    pH, UA 7.0 5.0 - 8.0    Specific Gravity, UA <=1.005 1.005 - 1.030    Protein, UA Negative Negative    Glucose, UA Negative Negative    Ketones, UA Negative Negative    Bilirubin (UA) Negative Negative    Occult Blood UA Trace (A) Negative    Nitrite, UA Negative Negative    Urobilinogen, UA 1.0 Negative EU/dL    Leukocytes, UA 3+ (A) Negative   Urinalysis Microscopic    Collection Time: 06/01/23  4:41 PM   Result Value Ref Range    RBC, UA 2 0 - 4 /hpf    WBC, UA 25 (H) 0 - 5 /hpf    Bacteria Many (A) None-Occ /hpf    Microscopic Comment SEE COMMENT    Drug screen panel, emergency    Collection Time: 06/01/23  4:49 PM   Result Value Ref Range    Benzodiazepines Negative  Negative    Methadone metabolites Negative Negative    Cocaine (Metab.) Negative Negative    Opiate Scrn, Ur Negative Negative    Barbiturate Screen, Ur Negative Negative    Amphetamine Screen, Ur Negative Negative    THC Negative Negative    Phencyclidine Negative Negative    Creatinine, Urine 26.3 15.0 - 325.0 mg/dL    Toxicology Information SEE COMMENT    ISTAT PROCEDURE    Collection Time: 06/01/23  6:07 PM   Result Value Ref Range    POC PH 7.462 (H) 7.35 - 7.45    POC PCO2 48.7 (H) 35 - 45 mmHg    POC PO2 44 (LL) 80 - 100 mmHg    POC HCO3 34.8 (H) 24 - 28 mmol/L    POC BE 11 -2 to 2 mmol/L    POC SATURATED O2 81 (L) 95 - 100 %    Sample ARTERIAL     Site LB     Allens Test N/A     DelSys Room Air     Mode SPONT     FiO2 21     Sp02 81    CBC auto differential    Collection Time: 06/01/23  6:20 PM   Result Value Ref Range    WBC 8.54 3.90 - 12.70 K/uL    RBC 4.67 4.00 - 5.40 M/uL    Hemoglobin 8.1 (L) 12.0 - 16.0 g/dL    Hematocrit 30.9 (L) 37.0 - 48.5 %    MCV 66 (L) 82 - 98 fL    MCH 17.3 (L) 27.0 - 31.0 pg    MCHC 26.2 (L) 32.0 - 36.0 g/dL    RDW 21.3 (H) 11.5 - 14.5 %    Platelets 456 (H) 150 - 450 K/uL    MPV 9.1 (L) 9.2 - 12.9 fL    Immature Granulocytes 0.2 0.0 - 0.5 %    Gran # (ANC) 6.2 1.8 - 7.7 K/uL    Immature Grans (Abs) 0.02 0.00 - 0.04 K/uL    Lymph # 1.8 1.0 - 4.8 K/uL    Mono # 0.4 0.3 - 1.0 K/uL    Eos # 0.0 0.0 - 0.5 K/uL    Baso # 0.04 0.00 - 0.20 K/uL    nRBC 0 0 /100 WBC    Gran % 72.7 38.0 - 73.0 %    Lymph % 21.2 18.0 - 48.0 %    Mono % 5.0 4.0 - 15.0 %    Eosinophil % 0.4 0.0 - 8.0 %    Basophil % 0.5 0.0 - 1.9 %    Differential Method Automated    Comprehensive metabolic panel    Collection Time: 06/01/23  6:20 PM   Result Value Ref Range    Sodium 139 136 - 145 mmol/L    Potassium 2.8 (L) 3.5 - 5.1 mmol/L    Chloride 94 (L) 95 - 110 mmol/L    CO2 32 (H) 23 - 29 mmol/L    Glucose 98 70 - 110 mg/dL    BUN 16 8 - 23 mg/dL    Creatinine 0.8 0.5 - 1.4 mg/dL    Calcium 10.1 8.7 - 10.5 mg/dL     Total Protein 7.7 6.0 - 8.4 g/dL    Albumin 4.2 3.5 - 5.2 g/dL    Total Bilirubin 1.3 (H) 0.1 - 1.0 mg/dL    Alkaline Phosphatase 71 55 - 135 U/L    AST 17 10 - 40 U/L    ALT 7 (L) 10 - 44 U/L    Anion Gap 13 8 - 16 mmol/L    eGFR >60.0 >60 mL/min/1.73 m^2   Magnesium    Collection Time: 06/01/23  6:20 PM   Result Value Ref Range    Magnesium 2.1 1.6 - 2.6 mg/dL   Brain natriuretic peptide    Collection Time: 06/01/23  6:20 PM   Result Value Ref Range     (H) 0 - 99 pg/mL   Troponin I    Collection Time: 06/01/23  6:20 PM   Result Value Ref Range    Troponin I 0.044 (H) 0.000 - 0.026 ng/mL   CPK    Collection Time: 06/01/23  6:20 PM   Result Value Ref Range    CPK 54 20 - 180 U/L   Phosphorus    Collection Time: 06/01/23  6:20 PM   Result Value Ref Range    Phosphorus 2.6 (L) 2.7 - 4.5 mg/dL         Significant Imaging: I have reviewed all pertinent imaging results/findings within the past 24 hours.

## 2023-06-02 NOTE — ASSESSMENT & PLAN NOTE
Patient with Hypoxic Respiratory failure which is Acute.  She is not on home oxygen. Supplemental oxygen was provided and noted improvement in sats as per below.      Signs/symptoms of respiratory failure include- tachypnea and increased work of breathing. Contributing diagnoses includes - CHF Labs and images were reviewed. Patient Has recent ABG, which has been reviewed. Will treat underlying causes and adjust management of respiratory failure as follows:    ABG RA: pH 7.46, PCO2 49, PAO2 44, sat 81%   SpO2 90 -> 88 -> 81% RA -> 97% 2L NC

## 2023-06-02 NOTE — ASSESSMENT & PLAN NOTE
This is more representative of type II strain ischemia from CHF  Will trend out to be sure  EKG with no acute ischemic changes         [START ON 6/2/2023] aspirin chewable tablet 81 mg, 81 mg, Oral, QHS     [START ON 6/2/2023] aspirin tablet 325 mg, 325 mg, Oral, Once     atorvastatin tablet 40 mg, 40 mg, Oral, QHS     [START ON 6/2/2023] carvediloL tablet 3.125 mg, 3.125 mg, Oral, BID WM

## 2023-06-02 NOTE — PLAN OF CARE
Problem: Gas Exchange Impaired  Goal: Optimal Gas Exchange  Outcome: Ongoing, Progressing  Intervention: Optimize Oxygenation and Ventilation  Flowsheets (Taken 6/2/2023 0707)  Airway/Ventilation Management: airway patency maintained  Head of Bed (HOB) Positioning: HOB at 30 degrees   Patient in no apparent distress. Sat's 100  % on 3 lpm, decreased to 1 lpm. PRN treatments not needed . Will continue to monitor.

## 2023-06-02 NOTE — ASSESSMENT & PLAN NOTE
Follow up with GI for scopes  Check B12, folate, Fe studies  Start PO Fe for iron deficiency     Latest Reference Range & Units 05/23/23 05:34 06/01/23 18:20   Hemoglobin 12.0 - 16.0 g/dL 7.6 (L) 8.1 (L)   Hematocrit 37.0 - 48.5 % 29.2 (L) 30.9 (L)   MCV 82 - 98 fL 68 (L) 66 (L)

## 2023-06-02 NOTE — H&P
"[facility] Medicine  History & Physical    Patient Name: Radha Alfred  MRN: [id number]  Admission Date: 6/1/2023  Attending Physician: Yariel Keane MD   Primary Care Provider: FRANSISCA Rodriguez         Patient information was obtained from patient, past medical records and ER records.       Subjective:     Principal Problem:Acute on chronic combined systolic and diastolic heart failure    Chief Complaint:   Chief Complaint   Patient presents with    Altered Mental Status     EMS states patient has history of Schizophrenia and has not been taking her medications for two weeks.          HPI:   Ms. Alfred is a 68yo lady with a past medical history of breast cancer, schizophrenia, hypothyroidism, HTN, DM2, COPD, CHF, and bipolar 1.  His last TTE was 5/17/23 and showed an EF of 45%, diastolic dysfunction, PAP 45 mmHg and mild RV enlargement.    She was recently admitted to [place] on 5/17/23 to 5/24/23 with CHF exacerbation with hypoxic respiratory failure.  She refused SNF at discharge and took none of her prescribed medications after she went home.  She was not requiring any supplemental O2 at discharge.    Ms. Alfred now comes to the ED after her daughter found her confused with bizarre behavior at home.  When asked why she came to the hospital, she tells me, "because I had to pee."  She has no recollection of the day's events and remains a little withdrawn and confused.  When she got to the ED, she was tangential, agitated and very confused, requiring multiple dose of psychotropic meds (see below) to get her to cooperate with labs and radiographs.  She was PEC'd.  She is able to tell me she has no CP or SOB, but then just states, "I want to go home."      VS in the ED were /63 -> 125/63 (BP Location: Right arm, Patient Position: Lying)   Pulse 78   Temp 98.6 °F (37 °C) (Oral)   Resp 18   Ht 5' 2" (1.575 m)   Wt 99.8 kg (220 lb)   SpO2 90 -> 88 -> 81% RA -> 97% 2L NC  " "Breastfeeding No   BMI 40.24 kg/m².  Labs showed Hg 8.1, MCV 66, K 2.8, Cr 0.8, TB 1.3, , Trop 0.044, UA: nitrite negative, LE 3+, WBC 25, many bacteria.    ABG RA: pH 7.46, PCO2 49, PAO2 44, sat 81%.    CXR showed the heart is enlarged.  Primary pulmonary artery segment enlarged.  Prominent lung markings suggesting pulmonary vascular distention and mild perihilar infiltrate could also be present in the right infrahilar region.  Distribution is similar to the prior exam but overall appearing slightly less prominent today.  The cardiomediastinal silhouette appears stable.  Impression:  Chest appearing slightly improved compared to the prior exam suggesting improving CHF.    In the ED she was treated with:  Medications   LORazepam injection 2 mg (2 mg Intramuscular Given 6/1/23 1747)   diphenhydrAMINE injection 50 mg (50 mg Intramuscular Given 6/1/23 1747)   haloperidol lactate injection 10 mg (10 mg Intramuscular Given 6/1/23 1747)   furosemide injection 80 mg (80 mg Intravenous Given 6/1/23 1927)               Past Medical History:   Diagnosis Date    Anxiety     Bipolar 1 disorder     Breast cancer     CHF (congestive heart failure)     COPD (chronic obstructive pulmonary disease)     Depression     Diabetes mellitus     Hypertension     PVD (peripheral vascular disease)     Schizophrenia     Thyroid disease        Past Surgical History:   Procedure Laterality Date    BREAST LUMPECTOMY      TONSILLECTOMY         Review of patient's allergies indicates:   Allergen Reactions    Cortisone      Other reaction(s): "breaks me out"    Iodine Hives    Iodine and iodide containing products        No current facility-administered medications on file prior to encounter.     Current Outpatient Medications on File Prior to Encounter   Medication Sig    albuterol sulfate 90 mcg/actuation aebs Inhale into the lungs.    furosemide (LASIX) 40 MG tablet Take 1 tablet (40 mg total) by mouth 2 (two) times a " day.    levothyroxine (SYNTHROID) 100 MCG tablet Take 1 tablet (100 mcg total) by mouth once daily.    lovastatin (MEVACOR) 10 MG tablet Take 1 tablet (10 mg total) by mouth every evening.    risperiDONE (RISPERDAL) 2 MG tablet Take 2 mg by mouth every evening.     Family History       Problem Relation (Age of Onset)    Lung cancer Mother          Tobacco Use    Smoking status: Former     Types: Cigarettes     Passive exposure: Never    Smokeless tobacco: Never   Substance and Sexual Activity    Alcohol use: Not Currently    Drug use: Never    Sexual activity: Not Currently     Birth control/protection: Post-menopausal     Review of Systems   Unable to perform ROS: Psychiatric disorder   Objective:     Vital Signs (Most Recent):  Temp: 98.6 °F (37 °C) (06/01/23 1539)  Pulse: 78 (06/01/23 2037)  Resp: 18 (06/01/23 2037)  BP: 125/63 (06/01/23 2037)  SpO2: 97 % (06/01/23 2037) Vital Signs (24h Range):  Temp:  [98.6 °F (37 °C)] 98.6 °F (37 °C)  Pulse:  [] 78  Resp:  [14-24] 18  SpO2:  [81 %-100 %] 97 %  BP: (125-145)/(31-63) 125/63     Weight: 99.8 kg (220 lb)  Body mass index is 40.24 kg/m².     Physical Exam  Constitutional:       General: She is not in acute distress.     Appearance: She is morbidly obese. She is not ill-appearing, toxic-appearing or diaphoretic.   HENT:      Head: Normocephalic and atraumatic.   Pulmonary:      Effort: No tachypnea or bradypnea.   Genitourinary:     Comments: No cuellar in place  Neurological:      Mental Status: She is lethargic and disoriented.      GCS: GCS eye subscore is 4. GCS verbal subscore is 5. GCS motor subscore is 6.   Psychiatric:         Attention and Perception: She is inattentive.         Mood and Affect: Mood is depressed.         Speech: Speech is delayed and tangential.         Behavior: Behavior is withdrawn.         Cognition and Memory: Cognition is impaired. Memory is impaired. She exhibits impaired recent memory and impaired remote memory.               Significant Labs: All pertinent labs within the past 24 hours have been reviewed.  Recent Results (from the past 24 hour(s))   Urinalysis, Reflex to Urine Culture Urine, Clean Catch    Collection Time: 06/01/23  4:41 PM    Specimen: Urine   Result Value Ref Range    Specimen UA Urine, Unspecified     Color, UA Yellow Yellow, Straw, Saranya    Appearance, UA Clear Clear    pH, UA 7.0 5.0 - 8.0    Specific Gravity, UA <=1.005 1.005 - 1.030    Protein, UA Negative Negative    Glucose, UA Negative Negative    Ketones, UA Negative Negative    Bilirubin (UA) Negative Negative    Occult Blood UA Trace (A) Negative    Nitrite, UA Negative Negative    Urobilinogen, UA 1.0 Negative EU/dL    Leukocytes, UA 3+ (A) Negative   Urinalysis Microscopic    Collection Time: 06/01/23  4:41 PM   Result Value Ref Range    RBC, UA 2 0 - 4 /hpf    WBC, UA 25 (H) 0 - 5 /hpf    Bacteria Many (A) None-Occ /hpf    Microscopic Comment SEE COMMENT    Drug screen panel, emergency    Collection Time: 06/01/23  4:49 PM   Result Value Ref Range    Benzodiazepines Negative Negative    Methadone metabolites Negative Negative    Cocaine (Metab.) Negative Negative    Opiate Scrn, Ur Negative Negative    Barbiturate Screen, Ur Negative Negative    Amphetamine Screen, Ur Negative Negative    THC Negative Negative    Phencyclidine Negative Negative    Creatinine, Urine 26.3 15.0 - 325.0 mg/dL    Toxicology Information SEE COMMENT    ISTAT PROCEDURE    Collection Time: 06/01/23  6:07 PM   Result Value Ref Range    POC PH 7.462 (H) 7.35 - 7.45    POC PCO2 48.7 (H) 35 - 45 mmHg    POC PO2 44 (LL) 80 - 100 mmHg    POC HCO3 34.8 (H) 24 - 28 mmol/L    POC BE 11 -2 to 2 mmol/L    POC SATURATED O2 81 (L) 95 - 100 %    Sample ARTERIAL     Site LB     Allens Test N/A     DelSys Room Air     Mode SPONT     FiO2 21     Sp02 81    CBC auto differential    Collection Time: 06/01/23  6:20 PM   Result Value Ref Range    WBC 8.54 3.90 - 12.70 K/uL    RBC 4.67 4.00 -  5.40 M/uL    Hemoglobin 8.1 (L) 12.0 - 16.0 g/dL    Hematocrit 30.9 (L) 37.0 - 48.5 %    MCV 66 (L) 82 - 98 fL    MCH 17.3 (L) 27.0 - 31.0 pg    MCHC 26.2 (L) 32.0 - 36.0 g/dL    RDW 21.3 (H) 11.5 - 14.5 %    Platelets 456 (H) 150 - 450 K/uL    MPV 9.1 (L) 9.2 - 12.9 fL    Immature Granulocytes 0.2 0.0 - 0.5 %    Gran # (ANC) 6.2 1.8 - 7.7 K/uL    Immature Grans (Abs) 0.02 0.00 - 0.04 K/uL    Lymph # 1.8 1.0 - 4.8 K/uL    Mono # 0.4 0.3 - 1.0 K/uL    Eos # 0.0 0.0 - 0.5 K/uL    Baso # 0.04 0.00 - 0.20 K/uL    nRBC 0 0 /100 WBC    Gran % 72.7 38.0 - 73.0 %    Lymph % 21.2 18.0 - 48.0 %    Mono % 5.0 4.0 - 15.0 %    Eosinophil % 0.4 0.0 - 8.0 %    Basophil % 0.5 0.0 - 1.9 %    Differential Method Automated    Comprehensive metabolic panel    Collection Time: 06/01/23  6:20 PM   Result Value Ref Range    Sodium 139 136 - 145 mmol/L    Potassium 2.8 (L) 3.5 - 5.1 mmol/L    Chloride 94 (L) 95 - 110 mmol/L    CO2 32 (H) 23 - 29 mmol/L    Glucose 98 70 - 110 mg/dL    BUN 16 8 - 23 mg/dL    Creatinine 0.8 0.5 - 1.4 mg/dL    Calcium 10.1 8.7 - 10.5 mg/dL    Total Protein 7.7 6.0 - 8.4 g/dL    Albumin 4.2 3.5 - 5.2 g/dL    Total Bilirubin 1.3 (H) 0.1 - 1.0 mg/dL    Alkaline Phosphatase 71 55 - 135 U/L    AST 17 10 - 40 U/L    ALT 7 (L) 10 - 44 U/L    Anion Gap 13 8 - 16 mmol/L    eGFR >60.0 >60 mL/min/1.73 m^2   Magnesium    Collection Time: 06/01/23  6:20 PM   Result Value Ref Range    Magnesium 2.1 1.6 - 2.6 mg/dL   Brain natriuretic peptide    Collection Time: 06/01/23  6:20 PM   Result Value Ref Range     (H) 0 - 99 pg/mL   Troponin I    Collection Time: 06/01/23  6:20 PM   Result Value Ref Range    Troponin I 0.044 (H) 0.000 - 0.026 ng/mL   CPK    Collection Time: 06/01/23  6:20 PM   Result Value Ref Range    CPK 54 20 - 180 U/L   Phosphorus    Collection Time: 06/01/23  6:20 PM   Result Value Ref Range    Phosphorus 2.6 (L) 2.7 - 4.5 mg/dL         Significant Imaging: I have reviewed all pertinent imaging  results/findings within the past 24 hours.    Assessment/Plan:     * Acute on chronic combined systolic and diastolic heart failure  Patient is identified as having Combined Systolic and Diastolic heart failure that is Acute on chronic. CHF is currently uncontrolled due to Continued edema of extremities, Dyspnea not returned to baseline after 1 doses of IV diuretic and Pulmonary edema/pleural effusion on CXR. Latest ECHO performed and demonstrates- Results for orders placed during the hospital encounter of 05/17/23    Echo    Interpretation Summary  · The left ventricle is mildly enlarged with concentric hypertrophy and mildly decreased systolic function.  · The estimated ejection fraction is 45%.  · Left ventricular diastolic dysfunction.  · Mild right ventricular enlargement.  · Normal central venous pressure (3 mmHg).  · The estimated PA systolic pressure is 45 mmHg.  · Small pericardial effusion.  · There is moderate left ventricular global hypokinesis.  · Moderate left atrial enlargement.       Continue Beta Blocker, ACE/ARB, Furosemide and Aldactone and monitor clinical status closely. Monitor on telemetry. Patient is on CHF pathway.  Monitor strict Is&Os and daily weights.  Place on fluid restriction of 1 L. Continue to stress to patient importance of self efficacy and  on diet for CHF. Last BNP reviewed- and noted below   Recent Labs   Lab 06/01/23  1820   *            [START ON 6/2/2023] carvediloL tablet 3.125 mg, 3.125 mg, Oral, BID WM     [START ON 6/2/2023] furosemide injection 60 mg, 60 mg, Intravenous, Q12H     lisinopriL tablet 10 mg, 10 mg, Oral, Daily     [START ON 6/2/2023] spironolactone tablet 25 mg, 25 mg, Oral, Daily         Acute respiratory failure with hypoxia  Patient with Hypoxic Respiratory failure which is Acute.  She is not on home oxygen. Supplemental oxygen was provided and noted improvement in sats as per below.      Signs/symptoms of respiratory failure include-  tachypnea and increased work of breathing. Contributing diagnoses includes - CHF Labs and images were reviewed. Patient Has recent ABG, which has been reviewed. Will treat underlying causes and adjust management of respiratory failure as follows:    ABG RA: pH 7.46, PCO2 49, PAO2 44, sat 81%   SpO2 90 -> 88 -> 81% RA -> 97% 2L NC       COPD (chronic obstructive pulmonary disease)  Started Duonebs q4 prn wheezing and SOB      Severe obesity (BMI >= 40)  Encourage exercise, weight loss and healthy diet      Acute cystitis without hematuria  Her UA is borderline equivocal  Started Rocephin 1g iv q24 hours  Follow up CXS  No SIRS     Latest Reference Range & Units 06/01/23 16:41   NITRITE UA Negative  Negative   UROBILINOGEN UA Negative EU/dL 1.0   Bilirubin (UA) Negative  Negative   Leukocytes, UA Negative  3+ !   RBC, UA 0 - 4 /hpf 2   WBC, UA 0 - 5 /hpf 25 (H)   Bacteria, UA None-Occ /hpf Many !          Elevated troponin level not due to acute coronary syndrome  This is more representative of type II strain ischemia from CHF  Will trend out to be sure  EKG with no acute ischemic changes         [START ON 6/2/2023] aspirin chewable tablet 81 mg, 81 mg, Oral, QHS     [START ON 6/2/2023] aspirin tablet 325 mg, 325 mg, Oral, Once     atorvastatin tablet 40 mg, 40 mg, Oral, QHS     [START ON 6/2/2023] carvediloL tablet 3.125 mg, 3.125 mg, Oral, BID WM         Hypokalemia  KCl 10 meq iv x 1  KCl 40 meq po x 1  Follow up in am  Mg 2.1      Schizoaffective disorder, bipolar type  She was highly confused, agitated and manic in the ED  She had to get Ativan, benadryl and Haldol to finally calm down  She is more lucid and cooperative now  She had PEC placed in the ED  Continue home Risperdal 2mg  She is non compliant with her meds per history      Hypothyroidism  Continue home Synthroid 100mcg po qday  Repeat thyroid panels      Microcytic anemia  Follow up with GI for scopes  Check B12, folate, Fe studies  Consider IV Fe  or PO Fe depending on how low     Latest Reference Range & Units 05/23/23 05:34 06/01/23 18:20   Hemoglobin 12.0 - 16.0 g/dL 7.6 (L) 8.1 (L)   Hematocrit 37.0 - 48.5 % 29.2 (L) 30.9 (L)   MCV 82 - 98 fL 68 (L) 66 (L)            VTE Risk Mitigation (From admission, onward)         Ordered     enoxaparin injection 40 mg  Every 12 hours         06/01/23 2056     Place JAMES hose  Until discontinued         06/01/23 2056     IP VTE HIGH RISK PATIENT  Once         06/01/23 2056     Place sequential compression device  Until discontinued         06/01/23 2056                     The attending portion of this evaluation, treatment, and documentation was performed per ZULAY Case MD via Telemedicine AudioVisual using the secure youcalc software platform with 2 way audio/video. The provider was located off-site and the patient is located in the hospital. The aforementioned video software was utilized to document the relevant history and physical exam    On 06/01/2023, patient should be placed in hospital observation services under my care.        ZULAY Case MD  Department of Hospital Medicine   [facility]

## 2023-06-02 NOTE — ASSESSMENT & PLAN NOTE
Continue rocephin for now, f/u cxs     Latest Reference Range & Units 06/01/23 16:41   NITRITE UA Negative  Negative   UROBILINOGEN UA Negative EU/dL 1.0   Bilirubin (UA) Negative  Negative   Leukocytes, UA Negative  3+ !   RBC, UA 0 - 4 /hpf 2   WBC, UA 0 - 5 /hpf 25 (H)   Bacteria, UA None-Occ /hpf Many !

## 2023-06-02 NOTE — NURSING
"Pt is refusing her potassium dose and her carvedilol. Pt stated, "I'm fine and I don't have any problems with my potassium and my heart." Attempted to educate the importance of her medications, and patient interrupted me constantly and stated, "I love you, but I'm not taking anything because I'm fine.  "

## 2023-06-02 NOTE — PT/OT/SLP EVAL
"Occupational Therapy   Evaluation    Name: Radha Alfred  MRN: [id number]  Admitting Diagnosis: Acute on chronic combined systolic and diastolic heart failure  Recent Surgery: * No surgery found *      Recommendations:     Discharge Recommendations:  dc home w/ hh services  Discharge Equipment Recommendations:   none required  Barriers to discharge:   patient lives alone     Assessment:     Radha Alfred is a 67 y.o. female with a medical diagnosis of Acute on chronic combined systolic and diastolic heart failure.  She presents with decreased ind w/ some adls.  Performance deficits affecting function:decreased functional activity tolerance  .      Rehab Prognosis: Good; patient would benefit from acute skilled OT services to address these deficits and reach maximum level of function.       Plan:     Patient to be seen   3-5x's per week to address the above listed problems via  ot intervention  Plan of Care Expires:  upon dc   Plan of Care Reviewed with:  nursing/ss/patient    Subjective     Chief Complaint: acute diastolic and systolic heart failure   Patient/Family Comments/goals: "to go back home."    Occupational Profile:  Living Environment: patient lives in [facility] living apartments by herself on first floor.    Previous level of function: mod ind to ind w/ all adls.   Roles and Routines: self care only as patient disabled.   Equipment Used at Home:  rolling walker.   Assistance upon Discharge: will have limited assistance upon dc    Pain/Comfort:   No pain at this time.     Patients cultural, spiritual, Jainism conflicts given the current situation:  will not interfere w/ her tx here.     Objective:     Communicated with: nursing prior to session.  Patient found up in chair with   upon OT entry to room.    General Precautions: Standard,    Orthopedic Precautions:  none  Braces:  none  Respiratory Status: Nasal cannula, flow 2 L/min    Occupational Performance:    Bed Mobility:    Patient completed " Rolling/Turning to Left with  stand by assistance  Patient completed Rolling/Turning to Right with stand by assistance  Patient completed Scooting/Bridging with stand by assistance  Patient completed Supine to Sit with stand by assistance  Patient completed Sit to Supine with stand by assistance    Functional Mobility/Transfers:  Patient completed Sit <> Stand Transfer with stand by assistance  with  rolling walker   Functional Mobility: sba     Activities of Daily Living:  Mod ind w/ self feed  Mod ind w/ g/h  Sua w/ ub dressing  Sua w/ lb dressing  Min a w/ bathing  Sba w/ toileting    Cognitive/Visual Perceptual:  Cognitive/Psychosocial Skills:     -       Oriented to: Person, Place, and Time   -       Follows Commands/attention:Follows two-step commands  -       Communication: clear/fluent  -       Memory: No Deficits noted  -       Safety awareness/insight to disability: intact  at times    Physical Exam:  AROM BUE'S WFLS ALL PLANES. Patient's static/dynamic stand balance is fair+/fair w/ use of rw.  Patient's static/dynamic sitting balance is good/fair+       Treatment & Education:  Patient sitting up in chair upon therapist arrival. Patient was able to perform self feed w/ sua. Patient performed sit to stand w/ sba w/ use of rw for stability. Patient performed arom therex for bue's and was able to move b arms throughout all ranges.           Patient left up in chair with all lines intact    GOALS:   Patient will perform lb dressing w/ mod ind.   Patient will increase functional activity tolerance to good in order to increase her ind w/ all adls.      History:     Past Medical History:   Diagnosis Date    Anxiety     Bipolar 1 disorder     Breast cancer     CHF (congestive heart failure)     COPD (chronic obstructive pulmonary disease)     Depression     Diabetes mellitus     Hypertension     PVD (peripheral vascular disease)     Schizophrenia     Thyroid disease          Past Surgical History:   Procedure  Laterality Date    BREAST LUMPECTOMY      TONSILLECTOMY         Time Tracking:     OT Date of Treatment:  06/02/2023  OT Start Time:  9:48  OT Stop Time:  10:17  OT Total Time (min):  25    Billable Minutes:Evaluation 15  Self Care/Home Management 10    6/2/2023

## 2023-06-02 NOTE — PT/OT/SLP EVAL
"Physical Therapy Evaluation and Treatment    Patient Name: Radha Alfred   MRN: [id number]  Recent Surgery: * No surgery found *      Recommendations:     Discharge Recommendations: home with home health   Discharge Equipment Recommendations: none   Barriers to discharge: None    Assessment:   HPI:   Ms. Alfred is a 66yo lady with a past medical history of breast cancer, schizophrenia, hypothyroidism, HTN, DM2, COPD, CHF, and bipolar 1.  His last TTE was 5/17/23 and showed an EF of 45%, diastolic dysfunction, PAP 45 mmHg and mild RV enlargement.     She was recently admitted to [facility] on 5/17/23 to 5/24/23 with CHF exacerbation with hypoxic respiratory failure.  She refused SNF at discharge and took none of her prescribed medications after she went home.  She was not requiring any supplemental O2 at discharge.     Ms. Alfred now comes to the ED after her daughter found her confused with bizarre behavior at home.  When asked why she came to the hospital, she tells me, "because I had to pee."  She has no recollection of the day's events and remains a little withdrawn and confused.  When she got to the ED, she was tangential, agitated and very confused, requiring multiple dose of psychotropic meds (see below) to get her to cooperate with labs and radiographs.  She was PEC'd.  She is able to tell me she has no CP or SOB, but then just states, "I want to go home."    Radha Alfred is a 67 y.o. female admitted with a medical diagnosis of Acute on chronic combined systolic and diastolic heart failure. She presents with the following impairments/functional limitations: weakness, impaired endurance, impaired self care skills, impaired functional mobility, impaired balance, impaired cardiopulmonary response to activity.     Rehab Prognosis: Good; patient would benefit from acute PT services to address these deficits and reach maximum level of function.    Plan:     During this hospitalization, patient to be seen  (3-5 x/wk) to " address the above listed problems via gait training, therapeutic activities, therapeutic exercises    Plan of Care Expires:  (upon discharge from facility)    Subjective     Chief Complaint: acute on chronic combined systolic and diastolic heart failure  Patient Comments/Goals: patient stating she want to walk at time of PT arrival  Pain/Comfort:  Pain Rating 1: 0/10    Social History:  Living Environment: Patient lives alone in a first floor apartment with grab bars  Prior Level of Function: Prior to admission, patient was modified independent with ADLs using rolling walker for mobility  Equipment Used at Home: cane, straight, walker, rolling, grab bar, bedside commode  DME owned (not currently used): none  Assistance Upon Discharge:  home health services    Objective:     Communicated with RN prior to session. Patient found sitting on couch in room with oxygen, peripheral IV, pulse ox (continuous), telemetry upon PT entry to room.    General Precautions: Standard, fall, diabetic   Orthopedic Precautions: N/A   Braces: N/A    Respiratory Status: Nasal cannula, flow 1.0 L/min    Exams:  Cognition: Patient is oriented to Person, Place, Time, unsure of why she is in the hospital  RLE ROM:  WFL for patient medical status and level of mobility  RLE Strength:  3+/5  LLE ROM:   WFL for patient medical status and level of mobility  LLE Strength:  3+/5    Functional Mobility:  Gait belt applied - Yes  Bed Mobility  Activity not assessed as patient was sitting up on couch at start of eval and left sitting up on side of bed at conclusion  Transfers  Sit to Stand: stand by assistance with rolling walker and with cues for hand placement and safe technique  Gait  Patient ambulated 25' in room with rolling walker and contact guard assistance. Patient demonstrates decreased step length, wide base of support, decreased foot clearance, and decreased babak.   Balance  Sitting: independence  Standing: contact guard  assistance    Therapeutic Activities and Exercises:   Patient educated on role of acute care PT and PT POC, safety while in hospital including calling nurse for mobility, and call light usage  Patient educated about importance of OOB mobility and remaining up in chair most of the day.    AM-PAC 6 CLICK MOBILITY  Total Score:     Patient left sitting edge of bed with all lines intact and call button in reach.    GOALS:   Pt will perform sup<>sit transfers w/ independence  Pt will have sufficient dynamic balance to sit EOB while performing ADLs/therex w/ independence  PT will be able to stand up from EOB w/ independence using LRAD  Pt will ambulate 100 feet w/ modified independence using LRAD  Pt will be independent w/ HEP therex on BLE w/ good form and ROM   Pt will require no cueing for dynamic balance or safety awareness when performing transfers and ambulation w/ PT      History:     Past Medical History:   Diagnosis Date    Anxiety     Bipolar 1 disorder     Breast cancer     CHF (congestive heart failure)     COPD (chronic obstructive pulmonary disease)     Depression     Diabetes mellitus     Hypertension     PVD (peripheral vascular disease)     Schizophrenia     Thyroid disease        Past Surgical History:   Procedure Laterality Date    BREAST LUMPECTOMY      TONSILLECTOMY         Time Tracking:     PT Received On: 06/02/23  PT Start Time: 1248  PT Stop Time: 1312  PT Total Time (min): 24 min     Billable Minutes: Evaluation 12 min and Gait Training 12 min    6/2/2023

## 2023-06-03 NOTE — PLAN OF CARE
Problem: Infection  Goal: Absence of Infection Signs and Symptoms  Outcome: Ongoing, Progressing     Problem: Adult Inpatient Plan of Care  Goal: Plan of Care Review  Outcome: Ongoing, Progressing     Problem: Adult Inpatient Plan of Care  Goal: Patient-Specific Goal (Individualized)  Outcome: Ongoing, Progressing     Problem: Adult Inpatient Plan of Care  Goal: Absence of Hospital-Acquired Illness or Injury  Outcome: Ongoing, Progressing     Problem: Adult Inpatient Plan of Care  Goal: Optimal Comfort and Wellbeing  Outcome: Ongoing, Progressing     Problem: Bariatric Environmental Safety  Goal: Safety Maintained with Care  Outcome: Ongoing, Progressing     Problem: Skin Injury Risk Increased  Goal: Skin Health and Integrity  Outcome: Ongoing, Progressing     Problem: Anxiety  Goal: Anxiety Reduction or Resolution  Outcome: Ongoing, Progressing     Problem: Fall Injury Risk  Goal: Absence of Fall and Fall-Related Injury  Outcome: Ongoing, Progressing     Problem: Fall Injury Risk  Goal: Absence of Fall and Fall-Related Injury  Outcome: Ongoing, Progressing     Problem: Behavior Regulation Impairment (Disruptive Behavior)  Goal: Improved Impulse and Aggression Control (Disruptive Behavior)  Outcome: Ongoing, Progressing     Problem: Mood Impairment (Disruptive Behavior)  Goal: Improved Mood Symptoms (Disruptive Behavior)  Outcome: Ongoing, Progressing     Problem: Social, Occupational or Functional Impairment (Disruptive Behavior)  Goal: Enhanced Social, Occupational or Functional Skills (Disruptive Behavior)  Outcome: Ongoing, Progressing     Problem: Manic Behavior Episode  Goal: Decreased Manic Symptoms  Outcome: Ongoing, Progressing     Problem: Gas Exchange Impaired  Goal: Optimal Gas Exchange  Outcome: Ongoing, Progressing

## 2023-06-03 NOTE — PLAN OF CARE
Problem: Gas Exchange Impaired  Goal: Optimal Gas Exchange  Outcome: Ongoing, Progressing  Intervention: Optimize Oxygenation and Ventilation  Flowsheets (Taken 6/3/2023 6601)  Airway/Ventilation Management: airway patency maintained  Patient in no apparent distress. Sat's  97 % on 1 lpm. PRN treatment requested . Will continue to monitor.

## 2023-06-03 NOTE — PLAN OF CARE
Problem: Behavior Regulation Impairment (Disruptive Behavior)  Goal: Improved Impulse and Aggression Control (Disruptive Behavior)  Outcome: Ongoing, Progressing  Intervention: Promote Impulse and Aggression Control  Flowsheets (Taken 6/3/2023 1648)  De-Escalation Techniques:   1:1 observation initiated   appropriate behavior reinforced     Problem: Mood Impairment (Disruptive Behavior)  Goal: Improved Mood Symptoms (Disruptive Behavior)  Outcome: Ongoing, Progressing  Intervention: Optimize Emotion and Mood  Flowsheets (Taken 6/3/2023 1648)  Supportive Measures:   active listening utilized   self-care encouraged     Problem: Sleep Disturbance (Disruptive Behavior)  Goal: Improved Sleep (Disruptive Behavior)  Outcome: Ongoing, Progressing  Intervention: Promote Healthy Sleep Hygiene  Flowsheets (Taken 6/3/2023 1648)  Sleep Hygiene Promotion:   awakenings minimized   relaxation techniques promoted     Plan of care reviewed with pt. [id number] throughout shift with intermittent confusion. 2g sodium diet. Patient not cooperative with treatment plan. Tele psych initiated and completed today. Continue to Monitor Labs. VSS.  Safety maintained. Will continue to monitor. No complaints at this time.

## 2023-06-03 NOTE — NURSING
"Pt refused iron because "I don't take liquid medications because they killed my sister that had brain surgery." Pt also refused her risperidone because "I took them already and I don't have anything wrong with me." Pt was educated on the importance of taking these medications, and patient talked over me and and screamed, "Kendall wants me to take them and she's a quack."  "

## 2023-06-03 NOTE — NURSING
"Patient refused multiple medications at medication pass this morning. Discussed medications and their purposes with patient. Patient states, "I understand, but I am not taking that. I will take 40mg Lasix and my Levothyroxine. That is all I am taking." Provider aware.  "

## 2023-06-03 NOTE — ASSESSMENT & PLAN NOTE
She was highly confused, agitated and manic in the ED  She had to get Ativan, benadryl and Haldol to finally calm down  She had PEC placed in the ED, discontinued now though she is still refusing many of her medications  Continue home Risperdal 2mg  She is non compliant with her meds per history  Having conversations with people who are not present in the room. Uncertain if having auditory hallucinations or not. Speaking today about events in the past as if they have just occurred

## 2023-06-03 NOTE — SUBJECTIVE & OBJECTIVE
Interval History: uncertain baseline mental status though today patient has tangential and disordered thought content    Review of Systems  Objective:     Vital Signs (Most Recent):  Temp: 97.2 °F (36.2 °C) (06/03/23 1123)  Pulse: 83 (06/03/23 1123)  Resp: 14 (06/03/23 1123)  BP: (!) 156/68 (06/03/23 1123)  SpO2: 96 % (06/03/23 1123) Vital Signs (24h Range):  Temp:  [97.2 °F (36.2 °C)-98 °F (36.7 °C)] 97.2 °F (36.2 °C)  Pulse:  [66-89] 83  Resp:  [12-20] 14  SpO2:  [84 %-99 %] 96 %  BP: ()/(46-68) 156/68     Weight: 103.2 kg (227 lb 7.5 oz)  Body mass index is 41.6 kg/m².    Intake/Output Summary (Last 24 hours) at 6/3/2023 1236  Last data filed at 6/3/2023 0821  Gross per 24 hour   Intake 720 ml   Output --   Net 720 ml         Physical Exam  Vitals reviewed.   Cardiovascular:      Rate and Rhythm: Normal rate and regular rhythm.   Pulmonary:      Effort: Pulmonary effort is normal.   Abdominal:      General: There is no distension.   Neurological:      General: No focal deficit present.      Mental Status: She is alert.   Psychiatric:         Attention and Perception: She is inattentive.         Mood and Affect: Affect is labile.         Speech: She is noncommunicative. Speech is tangential.         Cognition and Memory: Memory is impaired.         Judgment: Judgment is inappropriate.           Significant Labs: All pertinent labs within the past 24 hours have been reviewed.  CBC:   Recent Labs   Lab 06/01/23  1820 06/03/23  0632   WBC 8.54 7.86   HGB 8.1* 6.8*   HCT 30.9* 26.4*   * 421     CMP:   Recent Labs   Lab 06/01/23  1820 06/02/23  0632 06/03/23  0650    141 137   K 2.8* 3.5 3.5   CL 94* 98 93*   CO2 32* 33* 31*   GLU 98 85 89   BUN 16 16 26*   CREATININE 0.8 0.7 1.1   CALCIUM 10.1 9.2 9.2   PROT 7.7  --   --    ALBUMIN 4.2  --   --    BILITOT 1.3*  --   --    ALKPHOS 71  --   --    AST 17  --   --    ALT 7*  --   --    ANIONGAP 13 10 13       Significant Imaging: I have reviewed all  pertinent imaging results/findings within the past 24 hours.

## 2023-06-03 NOTE — ASSESSMENT & PLAN NOTE
Patient is identified as having Combined Systolic and Diastolic heart failure that is Acute on chronic. CHF is currently uncontrolled due to Continued edema of extremities, Dyspnea not returned to baseline after 1 doses of IV diuretic and Pulmonary edema/pleural effusion on CXR. Latest ECHO performed and demonstrates- Results for orders placed during the hospital encounter of 05/17/23    Echo    Interpretation Summary  · The left ventricle is mildly enlarged with concentric hypertrophy and mildly decreased systolic function.  · The estimated ejection fraction is 45%.  · Left ventricular diastolic dysfunction.  · Mild right ventricular enlargement.  · Normal central venous pressure (3 mmHg).  · The estimated PA systolic pressure is 45 mmHg.  · Small pericardial effusion.  · There is moderate left ventricular global hypokinesis.  · Moderate left atrial enlargement.       Continue Beta Blocker, ACE/ARB, Furosemide and Aldactone and monitor clinical status closely. Monitor on telemetry. Patient is on CHF pathway.  Monitor strict Is&Os and daily weights.  Place on fluid restriction of 1 L. Continue to stress to patient importance of self efficacy and  on diet for CHF. Last BNP reviewed- and noted below   Recent Labs   Lab 06/01/23  1820   *            [START ON 6/2/2023] carvediloL tablet 3.125 mg, 3.125 mg, Oral, BID WM     [START ON 6/2/2023] furosemide injection 60 mg, 60 mg, Intravenous, Q12H     lisinopriL tablet 10 mg, 10 mg, Oral, Daily     [START ON 6/2/2023] spironolactone tablet 25 mg, 25 mg, Oral, Daily       Patient refusing IV lasix. Also refusing potassium at times. Attempting to regulate psychiatric medications in order to hopefully improve adherence to medication regimen overall. May need GeriPsych

## 2023-06-03 NOTE — NURSING
Patient was sleeping on her back. Oxygen sat dropped to 84%. Ensured probe on finger was intact. Turned patient's oxygen up to 2L. Patient turned to side and sat went up to 96%.

## 2023-06-03 NOTE — ASSESSMENT & PLAN NOTE
Follow up with GI for scopes  Check B12, folate, Fe studies  Start PO Fe for iron deficiency - patient refusing  Hgb 6.8 today, repeat and transfuse if < 7.0

## 2023-06-03 NOTE — NURSING
Patient advised us that she was not having her blood drawn until I gave her the rest of her medications. Advised patient that I had previously attempted to give her the medications this morning, but she refused. Brought medications to patient again, who again refused to take them. However, she did allow [facility] to draw her labs.

## 2023-06-03 NOTE — NURSING
"Pt refusing antibiotics. Pt states, "I ain't got no damn infection." Pt requesting pain medication. Pt offered her Norco 5-325 mg. Pt stated, "I'm only gonna take ibuprofen." Pt was informed that MD cannot order Ibuprofen at this time as he stated "NSAIDs contraindicated in CHF. NSAIDs negate the effects of ACEi, ARB and loop diuretics, and can cause kidney failure and worsened CHF." Pt interrupted me and screamed, "I don't care what he has to say. Give me my ibuprofen." Pt was informed that the doctor will not order her ibuprofen. Pt stated, "That doctor comes straight from [facility]."  "

## 2023-06-03 NOTE — CONSULTS
[facility]  Psychiatry  Telepsychiatry Consult Note    Please see previous notes:    Patient agreeable to consultation via telepsychiatry.    Tele-Consultation from Psychiatry started: 6/3/2023 at 1420  The chief complaint leading to psychiatric consultation is: schizophrenia  This consultation was requested by Dr. Argueta, the Emergency Department attending physician.  The location of the consulting psychiatrist is  [place] .  The patient location is  [facility] ICU   The patient arrived at the ED at: 6/1/23    Also present with the patient at the time of the consultation: RN    Patient Identification:   Radha Alfred is a 67 y.o. female.    Patient information was obtained from patient and past medical records.  Patient presented to the Emergency Department by ambulance where the patient received see Ambulance Run Sheet prior to arrival.    Inpatient consult to Telemedicine - Psyc  Consult performed by: Joseph Young MD  Consult ordered by: Shonda Argueta MD      Consult Start Time: 06/03/2023 14:20 CDT  Consult End Time: 06/03/2023 15:02 CDT      Subjective:     History of Present Illness:  Radha Alfred is a 67 y.o. female with a past psychiatric history of schizophrenia, currently presenting with Acute on chronic combined systolic and diastolic heart failure. TelePsychiatry was originally consulted to address the patient's symptoms of schizophrenia.    Per Primary MD:  Chief Complaint   Patient presents with    Altered Mental Status       EMS states patient has history of Schizophrenia and has not been taking her medications for two weeks.        HPI:   Ms. Alfred is a 68yo lady with a past medical history of breast cancer, schizophrenia, hypothyroidism, HTN, DM2, COPD, CHF, and bipolar 1.  His last TTE was 5/17/23 and showed an EF of 45%, diastolic dysfunction, PAP 45 mmHg and mild RV enlargement.     She was recently admitted to [facility] on 5/17/23 to 5/24/23 with CHF exacerbation with hypoxic  "respiratory failure.  She refused SNF at discharge and took none of her prescribed medications after she went home.  She was not requiring any supplemental O2 at discharge.     Ms. Alfred now comes to the ED after her daughter found her confused with bizarre behavior at home.  When asked why she came to the hospital, she tells me, "because I had to pee."  She has no recollection of the day's events and remains a little withdrawn and confused.  When she got to the ED, she was tangential, agitated and very confused, requiring multiple dose of psychotropic meds (see below) to get her to cooperate with labs and radiographs.  She was PEC'd.  She is able to tell me she has no CP or SOB, but then just states, "I want to go home."    Per Telepsych MD:  Upon initiation of interview, pt was seated on sofa next to hospital bed, dressed in hospital gown, in no apparent distress. Inquiry limited secondary to patient's defensive response style, irritable affect, and disorganized thought process (in the form of loose associations). That notwithstanding, patient indicated that she had been receiving mental health treatment since the age of 18. Stated that she had been in multiple psychiatric hospitals, with current medication regimen consisting of risperidone 2mg at night for "my bipolar schizophrenic." Interview was terminated after patient refused to participate further with this writer.    Psychiatric History:   Previous Psychiatric Hospitalizations: Yes - [facility]  Previous Medication Trials: Yes - Abilify, Risperidone, Seroquel  Previous Suicide Attempts: no   History of Violence: no  History of Depression: no  History of Tashia: no  History of Auditory/Visual Hallucination no  History of Delusions: no  Outpatient psychiatrist (current & past): Yes    Substance Abuse History:  Tobacco:No  Alcohol: No  Illicit Substances:No  Detox/Rehab: No    Legal History: Past charges/incarcerations: No     Family Psychiatric History: " "no    Social History:  Developmental/Childhood:Achieved all developmental milestones timely  *Education:High School Diploma  Employment Status/Finances:Disabled   Relationship Status/Sexual Orientation: :   Children:  that's my business  Housing Status: [facility]   history:  no  Access to gun: NO  Yazidi: YAN  Recreational activities: YAN    Medical Review of Symptoms:  History obtained from the patient VS unobtainable from patient due to mental status / lack of cooperation  General : NO chills or fever  Eyes: NO  visual changes  ENT: NO hearing change, nasal discharge or sore throat  Endocrine: NO weight changes or polydipsia/polyuria  Dermatological: NO rashes  Respiratory: NO cough, shortness of breath  Cardiovascular: NO chest pain, palpitations or racing heart  Gastrointestinal: NO nausea, vomiting, constipation or diarrhea  Musculoskeletal: NO muscle pain or stiffness  Neurological: NO confusion, dizziness, headaches or tremors  Psychiatric: please see HPI    Musculoskeletal Exam:  Muscle Strength & Tone: normal strength & tone  Gait & Station: ambulates with steady gait without assistance    Psychiatric Mental Status Exam:  Appearance: older than stated age  Level of Consciousness: awake  Behavior/Cooperation: eye contact minimal, defensive response style  Psychomotor: within normal limits   Speech: loud  Language: english, fluid  Orientation: grossly intact  Attention Span/Concentration: intact  Memory (Recent & Remote): Impaired to some degree  Mood: "upset"  Affect: irritable and labile  Thought Process: loose  Associations: loose associations  Thought Content: YAN  Fund of Knowledge: unable to assess  Insight: poor  Judgment: poor    Past Medical History:   Past Medical History:   Diagnosis Date    Anxiety     Bipolar 1 disorder     Breast cancer     CHF (congestive heart failure)     COPD (chronic obstructive pulmonary disease)     Depression     Diabetes mellitus     " Hypertension     PVD (peripheral vascular disease)     Schizophrenia     Thyroid disease       Laboratory Data:   Labs Reviewed   CBC W/ AUTO DIFFERENTIAL - Abnormal; Notable for the following components:       Result Value    Hemoglobin 8.1 (*)     Hematocrit 30.9 (*)     MCV 66 (*)     MCH 17.3 (*)     MCHC 26.2 (*)     RDW 21.3 (*)     Platelets 456 (*)     MPV 9.1 (*)     All other components within normal limits   COMPREHENSIVE METABOLIC PANEL - Abnormal; Notable for the following components:    Potassium 2.8 (*)     Chloride 94 (*)     CO2 32 (*)     Total Bilirubin 1.3 (*)     ALT 7 (*)     All other components within normal limits   B-TYPE NATRIURETIC PEPTIDE - Abnormal; Notable for the following components:     (*)     All other components within normal limits   TROPONIN I - Abnormal; Notable for the following components:    Troponin I 0.044 (*)     All other components within normal limits   URINALYSIS, REFLEX TO URINE CULTURE - Abnormal; Notable for the following components:    Occult Blood UA Trace (*)     Leukocytes, UA 3+ (*)     All other components within normal limits    Narrative:     Preferred Collection Type->Urine, Clean Catch  Specimen Source->Urine   URINALYSIS MICROSCOPIC - Abnormal; Notable for the following components:    WBC, UA 25 (*)     Bacteria Many (*)     All other components within normal limits    Narrative:     Preferred Collection Type->Urine, Clean Catch  Specimen Source->Urine   PHOSPHORUS - Abnormal; Notable for the following components:    Phosphorus 2.6 (*)     All other components within normal limits   ISTAT PROCEDURE - Abnormal; Notable for the following components:    POC PH 7.462 (*)     POC PCO2 48.7 (*)     POC PO2 44 (*)     POC HCO3 34.8 (*)     POC SATURATED O2 81 (*)     All other components within normal limits   MAGNESIUM   CK   DRUG SCREEN PANEL, URINE EMERGENCY    Narrative:     Specimen Source->Urine   PHOSPHORUS       Neurological History:  Seizures:  "YAN  Head trauma: YAN    Allergies:   Review of patient's allergies indicates:   Allergen Reactions    Cortisone      Other reaction(s): "breaks me out"    Iodine Hives    Iodine and iodide containing products        Medications in ER:   Medications   levothyroxine tablet 100 mcg (100 mcg Oral Given 6/3/23 0925)   atorvastatin tablet 40 mg (40 mg Oral Given 6/2/23 2024)   risperiDONE tablet 2 mg (2 mg Oral Not Given 6/2/23 2024)   sodium chloride 0.9% flush 10 mL (has no administration in time range)   enoxaparin injection 40 mg (40 mg Subcutaneous Not Given 6/3/23 0924)   furosemide injection 60 mg (60 mg Intravenous Given 6/3/23 0549)   lisinopriL tablet 10 mg (10 mg Oral Not Given 6/3/23 0900)   carvediloL tablet 3.125 mg (3.125 mg Oral Not Given 6/3/23 0736)   spironolactone tablet 25 mg (25 mg Oral Not Given 6/3/23 0926)   ondansetron injection 4 mg (has no administration in time range)   polyethylene glycol packet 17 g (17 g Oral Not Given 6/3/23 0900)   senna-docusate 8.6-50 mg per tablet 1 tablet (has no administration in time range)   cefTRIAXone (ROCEPHIN) 1 g in dextrose 5 % in water (D5W) 5 % 50 mL IVPB (MB+) (1 g Intravenous Not Given 6/3/23 0015)   aspirin chewable tablet 81 mg (81 mg Oral Given 6/2/23 2024)   potassium chloride SA CR tablet 40 mEq (40 mEq Oral Not Given 6/2/23 1353)   ferrous sulfate 300 mg (60 mg iron)/5 mL syrup 300 mg (300 mg Oral Not Given 6/3/23 0900)   albuterol-ipratropium 2.5 mg-0.5 mg/3 mL nebulizer solution 3 mL (3 mLs Nebulization Given 6/3/23 1250)   furosemide tablet 60 mg (40 mg Oral Given 6/3/23 0926)   potassium chloride SA CR tablet 40 mEq (40 mEq Oral Not Given 6/3/23 0900)   ibuprofen 20 mg/mL oral liquid 200 mg (has no administration in time range)   HYDROcodone-acetaminophen 5-325 mg per tablet 1 tablet (has no administration in time range)   LORazepam injection 2 mg (2 mg Intramuscular Given 6/1/23 2615)   diphenhydrAMINE injection 50 mg (50 mg Intramuscular " Given 6/1/23 1747)   haloperidol lactate injection 10 mg (10 mg Intramuscular Given 6/1/23 1747)   furosemide injection 80 mg (80 mg Intravenous Given 6/1/23 1927)   potassium chloride 10 mEq in 100 mL IVPB (10 mEq Intravenous New Bag 6/1/23 2301)   potassium chloride SA CR tablet 40 mEq (40 mEq Oral Given 6/1/23 2257)   aspirin tablet 325 mg (325 mg Oral Given 6/2/23 0022)       Medications at home:   Current Outpatient Medications   Medication Instructions    albuterol sulfate 90 mcg/actuation aebs Inhalation    furosemide (LASIX) 40 mg, Oral, 2 times daily    levothyroxine (SYNTHROID) 100 mcg, Oral, Daily    lovastatin (MEVACOR) 10 mg, Oral, Nightly    risperiDONE (RISPERDAL) 2 mg, Oral, Nightly         No new subjective & objective note has been filed under this hospital service since the last note was generated.      Assessment - Diagnosis - Goals:     Diagnosis/Impression:  Radha Alfred is a 67 y.o. female with a past psychiatric history of schizophrenia, currently presenting with Acute on chronic combined systolic and diastolic heart failure. TelePsychiatry was originally consulted to address the patient's symptoms of schizophrenia. On exam, patient with minimal eye contact, defensive response style, loud speech, irritable and labile affect, and disorganized thought process in the form of loosened associations, coupled with poor insight (medication noncompliance) and judgment.    Schizophrenia    Rec:    Legal Status: Recommend Continue involuntary psych hold as patient remains gravely disabled secondary to a major mental illness.     Precautions: 1:1 sitter    Medications: Continue Risperidone 2mg PO qHS for psychotic features (do not give until new EKG has resulted - most recent QTc 541ms); Ativan 2mg PO/IM q8hr PRN for non-redirectable agitation    Monitor: Please obtain daily EKG to monitor QTc. 6/1/23 QTc 541ms    Disposition: Once medically cleared by Primary MD, recommend seeking placement in an acute  psychiatric facility for safety and medical stabilization of current psychiatric symptomatology.    Time with patient: 20 minutes    More than 50% of the time was spent counseling/coordinating care    Consulting clinician was informed of the encounter and consult note.    Consultation ended: 6/3/2023 at 1501    Joseph Young MD  Psychiatry  [facility]

## 2023-06-03 NOTE — ASSESSMENT & PLAN NOTE
Continue rocephin for now, f/u cxs - gram negative rods     Latest Reference Range & Units 06/01/23 16:41   NITRITE UA Negative  Negative   UROBILINOGEN UA Negative EU/dL 1.0   Bilirubin (UA) Negative  Negative   Leukocytes, UA Negative  3+ !   RBC, UA 0 - 4 /hpf 2   WBC, UA 0 - 5 /hpf 25 (H)   Bacteria, UA None-Occ /hpf Many !

## 2023-06-03 NOTE — ASSESSMENT & PLAN NOTE
Continues to require supplemental oxygen though not medically optimized at this time due to difficulties getting patient to comply with medication regimen

## 2023-06-04 NOTE — ASSESSMENT & PLAN NOTE
Follow up with GI for scopes  Check B12, folate, Fe studies  Start PO Fe for iron deficiency - patient refusing  Continue to monitor H/H daily - 6.8 yesterday but repeat >7.0  Transfuse for Hgb <7.0

## 2023-06-04 NOTE — HOSPITAL COURSE
Patient admitted for acute hypoxic respiratory failure 2/2 acute on chronic HFrEF exacerbation requiring IV diuresis and acute decompensation of psychiatric illness. Patient PEC'd. Inpatient psych consulted who recommend inpatient psych placement. Discussed patient's condition with her family members, a niece and brother. Discussed recommendations of Psychiatry and primary team that patient receive treatment in inpatient Psychiatric facility once medically stabilized. Initially started on Merrem for ESBL UTI but changed to Invanz for once daily dosing to help with administration. Patient now euvolemic and not on O2. She intermittently refuses her HF medications which will continue to be an issue for Ms. Alfred as she does not seem to understand the need to take these medications to prevent further exacerbations. Patient is medically stable and cleared for discharge to inpatient Psychiatric facility. Finished abx for UTI prior to discharge

## 2023-06-04 NOTE — PROGRESS NOTES
"[facility] Medicine  Progress Note    Patient Name: Radha Alfred  MRN: [id number]  Patient Class: OP- Observation   Admission Date: 6/1/2023  Length of Stay: 0 days  Attending Physician: Yariel Keane MD  Primary Care Provider: FRANSISCA Rodriguez        Subjective:     Principal Problem:Acute on chronic combined systolic and diastolic heart failure        HPI:  Ms. Alfred is a 66yo lady with a past medical history of breast cancer, schizophrenia, hypothyroidism, HTN, DM2, COPD, CHF, and bipolar 1.  His last TTE was 5/17/23 and showed an EF of 45%, diastolic dysfunction, PAP 45 mmHg and mild RV enlargement.    She was recently admitted to [facility] on 5/17/23 to 5/24/23 with CHF exacerbation with hypoxic respiratory failure.  She refused SNF at discharge and took none of her prescribed medications after she went home.  She was not requiring any supplemental O2 at discharge.    Ms. Alfred now comes to the ED after her daughter found her confused with bizarre behavior at home.  When asked why she came to the hospital, she tells me, "because I had to pee."  She has no recollection of the day's events and remains a little withdrawn and confused.  When she got to the ED, she was tangential, agitated and very confused, requiring multiple dose of psychotropic meds (see below) to get her to cooperate with labs and radiographs.  She was PEC'd.  She is able to tell me she has no CP or SOB, but then just states, "I want to go home."      VS in the ED were /63 -> 125/63 (BP Location: Right arm, Patient Position: Lying)   Pulse 78   Temp 98.6 °F (37 °C) (Oral)   Resp 18   Ht 5' 2" (1.575 m)   Wt 99.8 kg (220 lb)   SpO2 90 -> 88 -> 81% RA -> 97% 2L NC  Breastfeeding No   BMI 40.24 kg/m².  Labs showed Hg 8.1, MCV 66, K 2.8, Cr 0.8, TB 1.3, , Trop 0.044, UA: nitrite negative, LE 3+, WBC 25, many bacteria.    ABG RA: pH 7.46, PCO2 49, PAO2 44, sat 81%.    CXR showed the heart is " enlarged.  Primary pulmonary artery segment enlarged.  Prominent lung markings suggesting pulmonary vascular distention and mild perihilar infiltrate could also be present in the right infrahilar region.  Distribution is similar to the prior exam but overall appearing slightly less prominent today.  The cardiomediastinal silhouette appears stable.  Impression:  Chest appearing slightly improved compared to the prior exam suggesting improving CHF.    In the ED she was treated with:  Medications   LORazepam injection 2 mg (2 mg Intramuscular Given 6/1/23 1747)   diphenhydrAMINE injection 50 mg (50 mg Intramuscular Given 6/1/23 1747)   haloperidol lactate injection 10 mg (10 mg Intramuscular Given 6/1/23 1747)   furosemide injection 80 mg (80 mg Intravenous Given 6/1/23 1927)               Overview/Hospital Course:  Discussed patient's condition with her family members, a niece and brother. Discussed recommendations of Psychiatry and primary team that patient receive treatment in inpatient Psychiatric facility once medically stabilized. She is currently refusing antibiotics for treatment of ESBL UTI but does not seem to understand the consequences. Initially started on Merrem but will change to Invanz for once daily dosing to hopefully help with administration. PEC hold placed. Will start looking for placement on Monday morning.       Interval History: remains with acute decompensated psychiatric illness, refusing antibiotics and medications    Review of Systems  Objective:     Vital Signs (Most Recent):  Temp: 98.5 °F (36.9 °C) (06/04/23 1130)  Pulse: 83 (06/04/23 1306)  Resp: 16 (06/04/23 1306)  BP: (!) 120/49 (06/04/23 1130)  SpO2: 98 % (06/04/23 1306) Vital Signs (24h Range):  Temp:  [98.1 °F (36.7 °C)-98.5 °F (36.9 °C)] 98.5 °F (36.9 °C)  Pulse:  [75-88] 83  Resp:  [10-22] 16  SpO2:  [86 %-99 %] 98 %  BP: (109-144)/(44-78) 120/49     Weight: 102.5 kg (226 lb)  Body mass index is 41.34 kg/m².    Intake/Output  Summary (Last 24 hours) at 6/4/2023 1344  Last data filed at 6/4/2023 1137  Gross per 24 hour   Intake 720 ml   Output --   Net 720 ml         Physical Exam  Vitals reviewed.   Cardiovascular:      Rate and Rhythm: Normal rate and regular rhythm.   Pulmonary:      Effort: Pulmonary effort is normal.   Abdominal:      General: There is no distension.   Neurological:      General: No focal deficit present.      Mental Status: She is alert.   Psychiatric:         Attention and Perception: She is inattentive.         Mood and Affect: Affect is labile.         Speech: She is noncommunicative.         Cognition and Memory: Memory is impaired.         Judgment: Judgment is inappropriate.      Comments: Disordered thinking           Significant Labs: All pertinent labs within the past 24 hours have been reviewed.  CBC:   Recent Labs   Lab 06/03/23  0632 06/03/23  1305   WBC 7.86  --    HGB 6.8* 7.3*   HCT 26.4* 26.9*     --      CMP:   Recent Labs   Lab 06/03/23  0650 06/04/23  0421    137   K 3.5 3.7   CL 93* 97   CO2 31* 29   GLU 89 98   BUN 26* 21   CREATININE 1.1 0.8   CALCIUM 9.2 9.2   ANIONGAP 13 11     Microbiology Results (last 7 days)       Procedure Component Value Units Date/Time    Urine culture [[id number]]  (Abnormal)  (Susceptibility) Collected: 06/01/23 1641    Order Status: Completed Specimen: Urine Updated: 06/03/23 2121     Urine Culture, Routine ESCHERICHIA COLI ESBL  50,000 - 99,999 cfu/ml      Narrative:      Preferred Collection Type->Urine, Clean Catch  Specimen Source->Urine              Significant Imaging: I have reviewed all pertinent imaging results/findings within the past 24 hours.      Assessment/Plan:      * Acute on chronic combined systolic and diastolic heart failure  Patient is identified as having Combined Systolic and Diastolic heart failure that is Acute on chronic. CHF is currently uncontrolled due to Continued edema of extremities, Dyspnea not returned to baseline after  1 doses of IV diuretic and Pulmonary edema/pleural effusion on CXR. Latest ECHO performed and demonstrates- Results for orders placed during the hospital encounter of 05/17/23    Echo    Interpretation Summary  · The left ventricle is mildly enlarged with concentric hypertrophy and mildly decreased systolic function.  · The estimated ejection fraction is 45%.  · Left ventricular diastolic dysfunction.  · Mild right ventricular enlargement.  · Normal central venous pressure (3 mmHg).  · The estimated PA systolic pressure is 45 mmHg.  · Small pericardial effusion.  · There is moderate left ventricular global hypokinesis.  · Moderate left atrial enlargement.       Continue Beta Blocker, ACE/ARB, Furosemide and Aldactone and monitor clinical status closely. Monitor on telemetry. Patient is on CHF pathway.  Monitor strict Is&Os and daily weights.  Place on fluid restriction of 1 L. Continue to stress to patient importance of self efficacy and  on diet for CHF. Last BNP reviewed- and noted below   Recent Labs   Lab 06/01/23  1820   *            [START ON 6/2/2023] carvediloL tablet 3.125 mg, 3.125 mg, Oral, BID WM     [START ON 6/2/2023] furosemide injection 60 mg, 60 mg, Intravenous, Q12H     lisinopriL tablet 10 mg, 10 mg, Oral, Daily     [START ON 6/2/2023] spironolactone tablet 25 mg, 25 mg, Oral, Daily       Patient refusing IV lasix. Also refusing potassium at times. Attempting to regulate psychiatric medications in order to hopefully improve adherence to medication regimen overall. May need GeriPsych        Acute respiratory failure with hypoxia  Continues to require supplemental oxygen though not medically optimized at this time due to difficulties getting patient to comply with medication regimen       Severe obesity (BMI >= 40)  Encourage exercise, weight loss and healthy diet      Acute cystitis without hematuria  ESBL E. Coli UTI  Change antibiotics to Invanz for ease of dosing as patient is  refusing to take any medications for most of the day due to acute psychiatric illness           Hypokalemia  KCl 10 meq iv x 1  KCl 40 meq po x 1  Follow daily  Mg 2.1  Continue replacement PO      Microcytic anemia  Follow up with GI for scopes  Check B12, folate, Fe studies  Start PO Fe for iron deficiency - patient refusing  Continue to monitor H/H daily - 6.8 yesterday but repeat >7.0  Transfuse for Hgb <7.0         Elevated troponin level not due to acute coronary syndrome  This is more representative of type II strain ischemia from CHF  Will trend out to be sure  EKG with no acute ischemic changes         [START ON 6/2/2023] aspirin chewable tablet 81 mg, 81 mg, Oral, QHS     [START ON 6/2/2023] aspirin tablet 325 mg, 325 mg, Oral, Once     atorvastatin tablet 40 mg, 40 mg, Oral, QHS     [START ON 6/2/2023] carvediloL tablet 3.125 mg, 3.125 mg, Oral, BID WM         Hypothyroidism  Continue home Synthroid 100mcg po qday  Repeat thyroid panels      Schizoaffective disorder, bipolar type  She was highly confused, agitated and manic in the ED  She had to get Ativan, benadryl and Haldol to finally calm down  TelePsych consulted due to decompensated psychiatric illness   - recommend continuing risperidone 2 mg QHS as long as QTc is <500   - PEC  - inpatient hospitalization at psychiatric facility recommended      COPD (chronic obstructive pulmonary disease)  Duonebs q4 prn wheezing and SOB  aerobika       VTE Risk Mitigation (From admission, onward)         Ordered     enoxaparin injection 40 mg  Every 12 hours         06/01/23 2056     Place JAMES hose  Until discontinued         06/01/23 2056     IP VTE HIGH RISK PATIENT  Once         06/01/23 2056     Place sequential compression device  Until discontinued         06/01/23 2056                Discharge Planning   BEE:      Code Status: Full Code   Is the patient medically ready for discharge?:     Reason for patient still in hospital (select all that apply):  Treatment  Discharge Plan A: Home Health                  Shonda Argueta MD  Department of Hospital Medicine   [place] - Intensive Care

## 2023-06-04 NOTE — NURSING
Orders received.  Discussed with patient the importance of antibiotic treatment for current UTI.  Patient verbalized understanding but continues to refuse antibiotics at this time.

## 2023-06-04 NOTE — ASSESSMENT & PLAN NOTE
She was highly confused, agitated and manic in the ED  She had to get Ativan, benadryl and Haldol to finally calm down  TelePsych consulted due to decompensated psychiatric illness   - recommend continuing risperidone 2 mg QHS as long as QTc is <500   - PEC  - inpatient hospitalization at psychiatric facility recommended

## 2023-06-04 NOTE — PLAN OF CARE
Problem: Gas Exchange Impaired  Goal: Optimal Gas Exchange  Outcome: Ongoing, Progressing  Intervention: Optimize Oxygenation and Ventilation  Flowsheets (Taken 6/4/2023 9952)  Airway/Ventilation Management: airway patency maintained   Patient in no apparent distress. Sat's  99 % on 1 lpm. Patient tried on room air. Sat's fell to 86%. Placed back on 1 lpm. PRN treatments given as requested. Will continue to monitor.

## 2023-06-04 NOTE — SUBJECTIVE & OBJECTIVE
Interval History: remains with acute decompensated psychiatric illness, refusing antibiotics and medications    Review of Systems  Objective:     Vital Signs (Most Recent):  Temp: 98.5 °F (36.9 °C) (06/04/23 1130)  Pulse: 83 (06/04/23 1306)  Resp: 16 (06/04/23 1306)  BP: (!) 120/49 (06/04/23 1130)  SpO2: 98 % (06/04/23 1306) Vital Signs (24h Range):  Temp:  [98.1 °F (36.7 °C)-98.5 °F (36.9 °C)] 98.5 °F (36.9 °C)  Pulse:  [75-88] 83  Resp:  [10-22] 16  SpO2:  [86 %-99 %] 98 %  BP: (109-144)/(44-78) 120/49     Weight: 102.5 kg (226 lb)  Body mass index is 41.34 kg/m².    Intake/Output Summary (Last 24 hours) at 6/4/2023 1344  Last data filed at 6/4/2023 1137  Gross per 24 hour   Intake 720 ml   Output --   Net 720 ml         Physical Exam  Vitals reviewed.   Cardiovascular:      Rate and Rhythm: Normal rate and regular rhythm.   Pulmonary:      Effort: Pulmonary effort is normal.   Abdominal:      General: There is no distension.   Neurological:      General: No focal deficit present.      Mental Status: She is alert.   Psychiatric:         Attention and Perception: She is inattentive.         Mood and Affect: Affect is labile.         Speech: She is noncommunicative.         Cognition and Memory: Memory is impaired.         Judgment: Judgment is inappropriate.      Comments: Disordered thinking           Significant Labs: All pertinent labs within the past 24 hours have been reviewed.  CBC:   Recent Labs   Lab 06/03/23  0632 06/03/23  1305   WBC 7.86  --    HGB 6.8* 7.3*   HCT 26.4* 26.9*     --      CMP:   Recent Labs   Lab 06/03/23  0650 06/04/23  0421    137   K 3.5 3.7   CL 93* 97   CO2 31* 29   GLU 89 98   BUN 26* 21   CREATININE 1.1 0.8   CALCIUM 9.2 9.2   ANIONGAP 13 11     Microbiology Results (last 7 days)       Procedure Component Value Units Date/Time    Urine culture [[id number]]  (Abnormal)  (Susceptibility) Collected: 06/01/23 1641    Order Status: Completed Specimen: Urine Updated: 06/03/23  2121     Urine Culture, Routine ESCHERICHIA COLI ESBL  50,000 - 99,999 cfu/ml      Narrative:      Preferred Collection Type->Urine, Clean Catch  Specimen Source->Urine              Significant Imaging: I have reviewed all pertinent imaging results/findings within the past 24 hours.

## 2023-06-04 NOTE — PLAN OF CARE
Problem: Fall Injury Risk  Goal: Absence of Fall and Fall-Related Injury  Outcome: Ongoing, Progressing  Intervention: Promote Injury-Free Environment  Flowsheets (Taken 6/4/2023 0100)  Safety Promotion/Fall Prevention:   pulse ox   room near unit station     Problem: Anxiety  Goal: Anxiety Reduction or Resolution  Outcome: Ongoing, Not Progressing  Intervention: Promote Anxiety Reduction  Flowsheets (Taken 6/4/2023 0100)  Supportive Measures:   active listening utilized   decision-making supported     Problem: Behavior Regulation Impairment (Disruptive Behavior)  Goal: Improved Impulse and Aggression Control (Disruptive Behavior)  Outcome: Ongoing, Not Progressing  Intervention: Promote Impulse and Aggression Control  Flowsheets (Taken 6/4/2023 0100)  De-Escalation Techniques:   1:1 observation initiated   quiet time facilitated   verbally redirected   stimulation decreased     Problem: Manic Behavior Episode  Goal: Decreased Manic Symptoms  Outcome: Ongoing, Not Progressing  Intervention: Promote Mood Equilibrium  Flowsheets (Taken 6/4/2023 0100)  Supportive Measures:   active listening utilized   decision-making supported

## 2023-06-04 NOTE — PLAN OF CARE
Problem: Adult Inpatient Plan of Care  Goal: Plan of Care Review  Outcome: Ongoing, Not Progressing
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
## 2023-06-04 NOTE — NURSING
"At bedside to administer 2100 medications. Pt states, "where's my risperidone and fluid pill? Those are the only medications I take at night."     RN attempted to explain that medications are not scheduled now, and that they had been prescribed earlier and refused. Pt states, "Well, I'm not taking anything except the belly shot then." RN informed pt that she would bring medications requested at the next scheduled time.     After RN leaves the room, pt continues to scream, "Bring me my medicines!" Pt also conversing with someone not present in room.   "

## 2023-06-04 NOTE — CARE UPDATE
His nurse alerted me to below UCX sensitivities.  I changed his ABx from Rocephin to Meropenem, based on sensitivities below.  Start contact isolation.    Urine culture  Order: [id number]  Status: Final result     Visible to patient: No (inaccessible in Patient Portal)     Next appt: 06/05/2023 at 01:30 PM in Podiatry (Andrew Sloan DPM)     Specimen Information: Urine   0 Result Notes      Component 3 d ago   Urine Culture, Routine  Abnormal   ESCHERICHIA COLI ESBL   50,000 - 99,999 cfu/ml     Resulting Agency [facility]        Susceptibility     Escherichia coli ESBL     CULTURE, URINE     Amikacin <=16 mcg/mL Sensitive     Amox/K Clav'ate 16/8 mcg/mL Resistant     Amp/Sulbactam >16/8 mcg/mL Resistant     Ampicillin >16 mcg/mL Resistant     Cefazolin >16 mcg/mL Resistant     Cefepime >16 mcg/mL Resistant     Ceftriaxone >32 mcg/mL Resistant     Ciprofloxacin >2 mcg/mL Resistant     Ertapenem <=0.5 mcg/mL Sensitive     Gentamicin >8 mcg/mL Resistant     Levofloxacin >4 mcg/mL Resistant     Meropenem <=1 mcg/mL Sensitive     Nitrofurantoin <=32 mcg/mL Sensitive     Piperacillin/Tazo <=16 mcg/mL Resistant     Tobramycin >8 mcg/mL Resistant     Trimeth/Sulfa >2/38 mcg/mL Resistant               Linear View      Narrative  Performed by: [facility]  Preferred Collection Type->Urine, Clean Catch   Specimen Source->Urine      Specimen Collected: 06/01/23 16:41 Last Resulted: 06/03/23 21:21

## 2023-06-04 NOTE — NURSING
Report received.  Assessment done.  VSS.  Awake and oriented at times, and at times verbalizes fleeting thoughts that make no sense.  Possible auditory hallucinations.  Often, the patient does carry on a conversation with no one in the room.  Respirations are even and unlabored.  Currently wearing 1 liter oxygen, per nasal cannula.  Ambulates to bathroom using walker and standby assist.  Refusing most of her medications this morning, despite reeducation concerning the importance of such medications.  PEC protocol.  Sitter is at bedside.  See flowsheet for further assessment.

## 2023-06-04 NOTE — ASSESSMENT & PLAN NOTE
ESBL E. Coli UTI  Change antibiotics to Invanz for ease of dosing as patient is refusing to take any medications for most of the day due to acute psychiatric illness

## 2023-06-04 NOTE — NURSING
"Attempted to administer scheduled IV antibiotics with Primary RN. Pt stated, "I'm not taking any medicines. Pt was informed that she had antibiotics scheduled. Pt stated, "You better not set me up. Will I fight you? No, but I'm not taking it. Pt asked if she is refusing the medication. Pt stated, "I refuse."  "

## 2023-06-04 NOTE — NURSING
C/S showed ESBL Ecoli resistant to rocephin. Updated MD on pt's current tx regimen and meds. Awaiting further orders.

## 2023-06-04 NOTE — NURSING
"Pt self removed oxygen. SpO2 80-84%. RN to bedside. Pt states, "I'm not putting it back on. Put that in your little chart."     "

## 2023-06-05 NOTE — ASSESSMENT & PLAN NOTE
She was highly confused, agitated and manic in the ED  She had to get Ativan, benadryl and Haldol to finally calm down  TelePsych consulted due to decompensated psychiatric illness   - recommend continuing risperidone 2 mg QHS as long as QTc is <500   - PEC  - inpatient hospitalization at psychiatric facility recommended    QTc <500, will administer Risperdal early to help with compliance

## 2023-06-05 NOTE — ASSESSMENT & PLAN NOTE
ESBL E. Coli UTI  Changed antibiotics to Invanz for ease of dosing as patient is refusing to take any medications for most of her stay due to acutely decompensated psychiatric illness  Changed to IM dosing 6/5 due to refusal

## 2023-06-05 NOTE — PT/OT/SLP PROGRESS
"Occupational Therapy   Treatment    Name: Radha Alfred  MRN: [id number]  Admitting Diagnosis:  Acute on chronic combined systolic and diastolic heart failure       Recommendations:     Discharge Recommendations:  Home with home health   Discharge Equipment Recommendations:   Rolling walker  Barriers to discharge:   None    Assessment:   Ms. Alfred is a 68yo lady with a past medical history of breast cancer, schizophrenia, hypothyroidism, HTN, DM2, COPD, CHF, and bipolar 1.  His last TTE was 5/17/23 and showed an EF of 45%, diastolic dysfunction, PAP 45 mmHg and mild RV enlargement.     She was recently admitted to [facility] on 5/17/23 to 5/24/23 with CHF exacerbation with hypoxic respiratory failure.  She refused SNF at discharge and took none of her prescribed medications after she went home.  She was not requiring any supplemental O2 at discharge.     Ms. Alfred now comes to the ED after her daughter found her confused with bizarre behavior at home.  When asked why she came to the hospital, she tells me, "because I had to pee."  She has no recollection of the day's events and remains a little withdrawn and confused.  When she got to the ED, she was tangential, agitated and very confused, requiring multiple dose of psychotropic meds (see below) to get her to cooperate with labs and radiographs.  She was PEC'd.  She is able to tell me she has no CP or SOB, but then just states, "I want to go home."     Radha Alfred is a 67 y.o. female admitted with a medical diagnosis of Acute on chronic combined systolic and diastolic heart failure. She presents with the following impairments/functional limitations: weakness, impaired endurance, impaired self care skills, impaired functional mobility, impaired balance, impaired cardiopulmonary response to activity.        Rehab Prognosis:  Good      Plan:     Patient to be seen   to address the above listed problems via    Plan of Care Expires:  When patient is discharged from hospital.  " Patient will be treated 3-5x per week while hospitalized.  Plan of Care Reviewed with:  Patient     Subjective     Pain/Comfort:   No pain     Objective:     Communicated with: OT spoke with patient's nurse prior to entering patient's room for session.    General Precautions: Standard,      Orthopedic Precautions:   Braces:    Respiratory Status: Room air     Occupational Performance:     Bed Mobility:    Not assessed on this date secondary to patient sitting on couch prior to and at the conclusion of session.     Functional Mobility/Transfers:  Patient requires SBA with functional transfer and when ambulating 100 feet with RW.    Activities of Daily Living:  Patient requires CGA with ADLs.      Treatment & Education:  Patient tolerated skilled OT session well on  this date.  Patient requires SBA when performing functional transfer and when ambulating 100 feet with RW.      GOALS: See initial evaluation for goals.      Time Tracking:     OT Date of Treatment:  6/5/2023  OT Start Time:  11:27  OT Stop Time:  11:50  OT Total Time (min):  23 minutes     Cruzito Tai OTR/L               6/5/2023

## 2023-06-05 NOTE — PT/OT/SLP PROGRESS
Patient stating she wants to get up and walk in the hallway today but wants to wait for Cruzito (OT) to do it, RN and MD aware. PT will follow and provided skilled PT as indicated.

## 2023-06-05 NOTE — PLAN OF CARE
Problem: Infection  Goal: Absence of Infection Signs and Symptoms  Outcome: Ongoing, Not Progressing     Problem: Adult Inpatient Plan of Care  Goal: Plan of Care Review  Outcome: Ongoing, Not Progressing  Goal: Patient-Specific Goal (Individualized)  Outcome: Ongoing, Not Progressing  Goal: Absence of Hospital-Acquired Illness or Injury  Outcome: Ongoing, Not Progressing  Goal: Optimal Comfort and Wellbeing  Outcome: Ongoing, Not Progressing  Goal: Readiness for Transition of Care  Outcome: Ongoing, Not Progressing     Problem: Bariatric Environmental Safety  Goal: Safety Maintained with Care  Outcome: Ongoing, Progressing     Problem: Skin Injury Risk Increased  Goal: Skin Health and Integrity  Outcome: Ongoing, Progressing     Problem: Anxiety  Goal: Anxiety Reduction or Resolution  Outcome: Ongoing, Not Progressing     Problem: Fall Injury Risk  Goal: Absence of Fall and Fall-Related Injury  Outcome: Ongoing, Not Progressing     Problem: Self-Care Deficit  Goal: Improved Ability to Complete Activities of Daily Living  Outcome: Ongoing, Not Progressing     Problem: Behavior Regulation Impairment (Disruptive Behavior)  Goal: Improved Impulse and Aggression Control (Disruptive Behavior)  Outcome: Ongoing, Not Progressing     Problem: Mood Impairment (Disruptive Behavior)  Goal: Improved Mood Symptoms (Disruptive Behavior)  Outcome: Ongoing, Not Progressing     Problem: Sleep Disturbance (Disruptive Behavior)  Goal: Improved Sleep (Disruptive Behavior)  Outcome: Ongoing, Not Progressing     Problem: Social, Occupational or Functional Impairment (Disruptive Behavior)  Goal: Enhanced Social, Occupational or Functional Skills (Disruptive Behavior)  Outcome: Ongoing, Not Progressing     Problem: Manic Behavior Episode  Goal: Decreased Manic Symptoms  Outcome: Ongoing, Not Progressing     Problem: Gas Exchange Impaired  Goal: Optimal Gas Exchange  Outcome: Ongoing, Not Progressing     Problem: Behavior Regulation  Impairment (Psychotic Signs/Symptoms)  Goal: Improved Behavioral Control (Psychotic Signs/Symptoms)  Outcome: Ongoing, Not Progressing     Problem: Cognitive Impairment (Psychotic Signs/Symptoms)  Goal: Optimal Cognitive Function (Psychotic Signs/Symptoms)  Outcome: Ongoing, Not Progressing     Problem: Decreased Participation and Engagement (Psychotic Signs/Symptoms)  Goal: Increased Participation and Engagement (Psychotic Signs/Symptoms)  Outcome: Ongoing, Not Progressing     Problem: Mood Impairment (Psychotic Signs/Symptoms)  Goal: Improved Mood Symptoms (Psychotic Signs/Symptoms)  Outcome: Ongoing, Not Progressing     Problem: Psychomotor Impairment (Psychotic Signs/Symptoms)  Goal: Improved Psychomotor Symptoms (Psychotic Signs/Symptoms)  Outcome: Ongoing, Not Progressing     Problem: Sensory Perception Impairment (Psychotic Signs/Symptoms)  Goal: Decreased Sensory Symptoms (Psychotic Signs/Symptoms)  Outcome: Ongoing, Not Progressing     Problem: Sleep Disturbance (Psychotic Signs/Symptoms)  Goal: Improved Sleep (Psychotic Signs/Symptoms)  Outcome: Ongoing, Not Progressing     Problem: Social, Occupational or Functional Impairment (Psychotic Signs/Symptoms)  Goal: Enhanced Social, Occupational or Functional Skills (Psychotic Signs/Symptoms)  Outcome: Ongoing, Not Progressing     Remains on 72hour Psychiatric Hold with 1:1 sitter at bedside. Has refused multiple medications throughout the day. Is having verbal and auditory hallucinations, often crying or laughing and talking to herself. Has been verbally aggressive at times. Has been up and down to the bathroom multiple times today and has moved from the chair to the bed multiple times also. Attempts to redirect or reorient effective some of the times, others not. Safety measures in use, bed in lowest locked position, side rails elevated, cb in reach at all times.

## 2023-06-05 NOTE — PLAN OF CARE
Problem: Skin Injury Risk Increased  Goal: Skin Health and Integrity  Outcome: Ongoing, Progressing  Intervention: Promote and Optimize Oral Intake  Flowsheets (Taken 6/5/2023 0102)  Oral Nutrition Promotion: rest periods promoted     Problem: Fall Injury Risk  Goal: Absence of Fall and Fall-Related Injury  Outcome: Ongoing, Progressing  Intervention: Promote Injury-Free Environment  Flowsheets (Taken 6/5/2023 0102)  Safety Promotion/Fall Prevention:   room near unit station   supervised activity   side rails raised x 2   /camera at bedside     Problem: Anxiety  Goal: Anxiety Reduction or Resolution  Outcome: Ongoing, Not Progressing  Intervention: Promote Anxiety Reduction  Flowsheets (Taken 6/5/2023 0102)  Supportive Measures:   active listening utilized   decision-making supported   self-responsibility promoted     Problem: Mood Impairment (Disruptive Behavior)  Goal: Improved Mood Symptoms (Disruptive Behavior)  Outcome: Ongoing, Not Progressing  Intervention: Optimize Emotion and Mood  Flowsheets (Taken 6/5/2023 0102)  Supportive Measures:   active listening utilized   decision-making supported   self-responsibility promoted

## 2023-06-05 NOTE — PROGRESS NOTES
"[facility] Medicine  Progress Note    Patient Name: Radha Alfred  MRN: [id number]  Patient Class: IP- Inpatient   Admission Date: 6/1/2023  Length of Stay: 0 days  Attending Physician: Shonda Argueta MD  Primary Care Provider: FRANSISCA Rodriguez        Subjective:     Principal Problem:Acute on chronic combined systolic and diastolic heart failure        HPI:  Ms. Alfred is a 68yo lady with a past medical history of breast cancer, schizophrenia, hypothyroidism, HTN, DM2, COPD, CHF, and bipolar 1.  His last TTE was 5/17/23 and showed an EF of 45%, diastolic dysfunction, PAP 45 mmHg and mild RV enlargement.    She was recently admitted to [facility] on 5/17/23 to 5/24/23 with CHF exacerbation with hypoxic respiratory failure.  She refused SNF at discharge and took none of her prescribed medications after she went home.  She was not requiring any supplemental O2 at discharge.    Ms. Alfred now comes to the ED after her daughter found her confused with bizarre behavior at home.  When asked why she came to the hospital, she tells me, "because I had to pee."  She has no recollection of the day's events and remains a little withdrawn and confused.  When she got to the ED, she was tangential, agitated and very confused, requiring multiple dose of psychotropic meds (see below) to get her to cooperate with labs and radiographs.  She was PEC'd.  She is able to tell me she has no CP or SOB, but then just states, "I want to go home."      VS in the ED were /63 -> 125/63 (BP Location: Right arm, Patient Position: Lying)   Pulse 78   Temp 98.6 °F (37 °C) (Oral)   Resp 18   Ht 5' 2" (1.575 m)   Wt 99.8 kg (220 lb)   SpO2 90 -> 88 -> 81% RA -> 97% 2L NC  Breastfeeding No   BMI 40.24 kg/m².  Labs showed Hg 8.1, MCV 66, K 2.8, Cr 0.8, TB 1.3, , Trop 0.044, UA: nitrite negative, LE 3+, WBC 25, many bacteria.    ABG RA: pH 7.46, PCO2 49, PAO2 44, sat 81%.    CXR showed the heart is enlarged. " " Primary pulmonary artery segment enlarged.  Prominent lung markings suggesting pulmonary vascular distention and mild perihilar infiltrate could also be present in the right infrahilar region.  Distribution is similar to the prior exam but overall appearing slightly less prominent today.  The cardiomediastinal silhouette appears stable.  Impression:  Chest appearing slightly improved compared to the prior exam suggesting improving CHF.    In the ED she was treated with:  Medications   LORazepam injection 2 mg (2 mg Intramuscular Given 6/1/23 1747)   diphenhydrAMINE injection 50 mg (50 mg Intramuscular Given 6/1/23 1747)   haloperidol lactate injection 10 mg (10 mg Intramuscular Given 6/1/23 1747)   furosemide injection 80 mg (80 mg Intravenous Given 6/1/23 1927)               Overview/Hospital Course:  Discussed patient's condition with her family members, a niece and brother. Discussed recommendations of Psychiatry and primary team that patient receive treatment in inpatient Psychiatric facility once medically stabilized. She is currently refusing antibiotics for treatment of ESBL UTI but does not seem to understand the consequences. Initially started on Merrem but will change to Invanz for once daily dosing to hopefully help with administration. PEC hold placed. Will start looking for placement on Monday morning.       Interval History: patient continues to refuse antibiotics and medications, today she said "I'm probably having a psychotic break." I explained to her that, yes, she is having a "psychotic break" and we are trying to stabilize her mental illness as well as treat her physical conditions such as UTI. She replied, "I'll take those antibiotics when hell freezes over. Just give them to me outpatient. Give me my iron." Then proceeded with a string of consciousness that was irrelevant to the conversation     Review of Systems  Objective:     Vital Signs (Most Recent):  Temp: 97.8 °F (36.6 °C) (06/05/23 " 0813)  Pulse: 92 (06/05/23 1030)  Resp: 11 (06/05/23 1030)  BP: (!) 112/50 (06/05/23 0813)  SpO2: (!) 91 % (06/05/23 1030) Vital Signs (24h Range):  Temp:  [97.8 °F (36.6 °C)-98.5 °F (36.9 °C)] 97.8 °F (36.6 °C)  Pulse:  [64-95] 92  Resp:  [11-24] 11  SpO2:  [89 %-100 %] 91 %  BP: (112-138)/(48-80) 112/50     Weight: 102.5 kg (226 lb)  Body mass index is 41.34 kg/m².    Intake/Output Summary (Last 24 hours) at 6/5/2023 1444  Last data filed at 6/4/2023 1642  Gross per 24 hour   Intake 240 ml   Output --   Net 240 ml         Physical Exam  Vitals reviewed.   Cardiovascular:      Rate and Rhythm: Normal rate and regular rhythm.   Pulmonary:      Effort: Pulmonary effort is normal.   Abdominal:      General: There is no distension.   Neurological:      General: No focal deficit present.      Mental Status: She is alert.   Psychiatric:         Attention and Perception: She is inattentive.         Mood and Affect: Affect is labile.         Speech: She is noncommunicative.         Cognition and Memory: Memory is impaired.         Judgment: Judgment is inappropriate.      Comments: Disordered thinking           Significant Labs: All pertinent labs within the past 24 hours have been reviewed.  CBC: No results for input(s): WBC, HGB, HCT, PLT in the last 48 hours.  CMP:   Recent Labs   Lab 06/04/23  0421 06/05/23  0633    139   K 3.7 3.5   CL 97 95   CO2 29 31*   GLU 98 96   BUN 21 12   CREATININE 0.8 0.8   CALCIUM 9.2 9.6   ANIONGAP 11 13       Significant Imaging: I have reviewed all pertinent imaging results/findings within the past 24 hours.        Assessment/Plan:      * Acute on chronic combined systolic and diastolic heart failure  Patient is identified as having Combined Systolic and Diastolic heart failure that is Acute on chronic. CHF is currently uncontrolled due to Continued edema of extremities, Dyspnea not returned to baseline after 1 doses of IV diuretic and Pulmonary edema/pleural effusion on CXR.  Latest ECHO performed and demonstrates- Results for orders placed during the hospital encounter of 05/17/23    Echo    Interpretation Summary  · The left ventricle is mildly enlarged with concentric hypertrophy and mildly decreased systolic function.  · The estimated ejection fraction is 45%.  · Left ventricular diastolic dysfunction.  · Mild right ventricular enlargement.  · Normal central venous pressure (3 mmHg).  · The estimated PA systolic pressure is 45 mmHg.  · Small pericardial effusion.  · There is moderate left ventricular global hypokinesis.  · Moderate left atrial enlargement.       Continue Beta Blocker, ACE/ARB, Furosemide and Aldactone and monitor clinical status closely. Monitor on telemetry. Patient is on CHF pathway.  Monitor strict Is&Os and daily weights.  Place on fluid restriction of 1 L. Continue to stress to patient importance of self efficacy and  on diet for CHF. Last BNP reviewed- and noted below   Recent Labs   Lab 06/01/23  1820   *            [START ON 6/2/2023] carvediloL tablet 3.125 mg, 3.125 mg, Oral, BID WM     [START ON 6/2/2023] furosemide injection 60 mg, 60 mg, Intravenous, Q12H     lisinopriL tablet 10 mg, 10 mg, Oral, Daily     [START ON 6/2/2023] spironolactone tablet 25 mg, 25 mg, Oral, Daily       Patient refusing IV lasix. Also refusing potassium at times. Attempting to regulate psychiatric medications in order to hopefully improve adherence to medication regimen overall. TelePsych recommending inpatient stay for regulation of medications. Patient is a danger to herself because of decompensated psychiatric illness resulting in refusal to adhere to medication regimen        Acute respiratory failure with hypoxia  Continues to require supplemental oxygen though not medically optimized at this time due to difficulties getting patient to comply with medication regimen       Severe obesity (BMI >= 40)  Encourage exercise, weight loss and healthy  diet      Acute cystitis without hematuria  ESBL E. Coli UTI  Changed antibiotics to Invanz for ease of dosing as patient is refusing to take any medications for most of her stay due to acutely decompensated psychiatric illness  Changed to IM dosing 6/5 due to refusal           Hypokalemia  KCl 10 meq iv x 1  KCl 40 meq po x 1  Follow daily  Mg 2.1  Continue replacement PO      Microcytic anemia  Follow up with GI for scopes  Check B12, folate, Fe studies  Start PO Fe for iron deficiency - patient refusing  Continue to monitor H/H daily - 6.8 yesterday but repeat >7.0  Transfuse for Hgb <7.0         Elevated troponin level not due to acute coronary syndrome  This is more representative of type II strain ischemia from CHF  Will trend out to be sure  EKG with no acute ischemic changes         [START ON 6/2/2023] aspirin chewable tablet 81 mg, 81 mg, Oral, QHS     [START ON 6/2/2023] aspirin tablet 325 mg, 325 mg, Oral, Once     atorvastatin tablet 40 mg, 40 mg, Oral, QHS     [START ON 6/2/2023] carvediloL tablet 3.125 mg, 3.125 mg, Oral, BID WM         Hypothyroidism  Continue home Synthroid 100mcg po qday  Repeat thyroid panels      Schizoaffective disorder, bipolar type  She was highly confused, agitated and manic in the ED  She had to get Ativan, benadryl and Haldol to finally calm down  TelePsych consulted due to decompensated psychiatric illness   - recommend continuing risperidone 2 mg QHS as long as QTc is <500   - PEC  - inpatient hospitalization at psychiatric facility recommended    QTc <500, will administer Risperdal early to help with compliance      COPD (chronic obstructive pulmonary disease)  Duonebs q4 prn wheezing and SOB  aerobika       VTE Risk Mitigation (From admission, onward)         Ordered     enoxaparin injection 40 mg  Every 12 hours         06/01/23 2056     Place JAMES hose  Until discontinued         06/01/23 2056     IP VTE HIGH RISK PATIENT  Once         06/01/23 2056     Place  sequential compression device  Until discontinued         06/01/23 2056                Discharge Planning   BEE:      Code Status: Full Code   Is the patient medically ready for discharge?:     Reason for patient still in hospital (select all that apply): Treatment  Discharge Plan A: Home Health                  Shonda Argueta MD  Department of [facility] Medicine   [facility] Intensive [facility]

## 2023-06-05 NOTE — ASSESSMENT & PLAN NOTE
Patient is identified as having Combined Systolic and Diastolic heart failure that is Acute on chronic. CHF is currently uncontrolled due to Continued edema of extremities, Dyspnea not returned to baseline after 1 doses of IV diuretic and Pulmonary edema/pleural effusion on CXR. Latest ECHO performed and demonstrates- Results for orders placed during the hospital encounter of 05/17/23    Echo    Interpretation Summary  · The left ventricle is mildly enlarged with concentric hypertrophy and mildly decreased systolic function.  · The estimated ejection fraction is 45%.  · Left ventricular diastolic dysfunction.  · Mild right ventricular enlargement.  · Normal central venous pressure (3 mmHg).  · The estimated PA systolic pressure is 45 mmHg.  · Small pericardial effusion.  · There is moderate left ventricular global hypokinesis.  · Moderate left atrial enlargement.       Continue Beta Blocker, ACE/ARB, Furosemide and Aldactone and monitor clinical status closely. Monitor on telemetry. Patient is on CHF pathway.  Monitor strict Is&Os and daily weights.  Place on fluid restriction of 1 L. Continue to stress to patient importance of self efficacy and  on diet for CHF. Last BNP reviewed- and noted below   Recent Labs   Lab 06/01/23  1820   *            [START ON 6/2/2023] carvediloL tablet 3.125 mg, 3.125 mg, Oral, BID WM     [START ON 6/2/2023] furosemide injection 60 mg, 60 mg, Intravenous, Q12H     lisinopriL tablet 10 mg, 10 mg, Oral, Daily     [START ON 6/2/2023] spironolactone tablet 25 mg, 25 mg, Oral, Daily       Patient refusing IV lasix. Also refusing potassium at times. Attempting to regulate psychiatric medications in order to hopefully improve adherence to medication regimen overall. TelePsych recommending inpatient stay for regulation of medications. Patient is a danger to herself because of decompensated psychiatric illness resulting in refusal to adhere to medication regimen

## 2023-06-05 NOTE — NURSING
"Patient states that she does not want the iv antibiotics and if we want to try the IM we would have to "tie her down to the bed". Doctor notified. Attempted to educate patient and she states she does not care.  "

## 2023-06-05 NOTE — SUBJECTIVE & OBJECTIVE
"Interval History: patient continues to refuse antibiotics and medications, today she said "I'm probably having a psychotic break." I explained to her that, yes, she is having a "psychotic break" and we are trying to stabilize her mental illness as well as treat her physical conditions such as UTI. She replied, "I'll take those antibiotics when hell freezes over. Just give them to me outpatient. Give me my iron." Then proceeded with a string of consciousness that was irrelevant to the conversation     Review of Systems  Objective:     Vital Signs (Most Recent):  Temp: 97.8 °F (36.6 °C) (06/05/23 0813)  Pulse: 92 (06/05/23 1030)  Resp: 11 (06/05/23 1030)  BP: (!) 112/50 (06/05/23 0813)  SpO2: (!) 91 % (06/05/23 1030) Vital Signs (24h Range):  Temp:  [97.8 °F (36.6 °C)-98.5 °F (36.9 °C)] 97.8 °F (36.6 °C)  Pulse:  [64-95] 92  Resp:  [11-24] 11  SpO2:  [89 %-100 %] 91 %  BP: (112-138)/(48-80) 112/50     Weight: 102.5 kg (226 lb)  Body mass index is 41.34 kg/m².    Intake/Output Summary (Last 24 hours) at 6/5/2023 1444  Last data filed at 6/4/2023 1642  Gross per 24 hour   Intake 240 ml   Output --   Net 240 ml         Physical Exam  Vitals reviewed.   Cardiovascular:      Rate and Rhythm: Normal rate and regular rhythm.   Pulmonary:      Effort: Pulmonary effort is normal.   Abdominal:      General: There is no distension.   Neurological:      General: No focal deficit present.      Mental Status: She is alert.   Psychiatric:         Attention and Perception: She is inattentive.         Mood and Affect: Affect is labile.         Speech: She is noncommunicative.         Cognition and Memory: Memory is impaired.         Judgment: Judgment is inappropriate.      Comments: Disordered thinking           Significant Labs: All pertinent labs within the past 24 hours have been reviewed.  CBC: No results for input(s): WBC, HGB, HCT, PLT in the last 48 hours.  CMP:   Recent Labs   Lab 06/04/23  0421 06/05/23  0633    139 "   K 3.7 3.5   CL 97 95   CO2 29 31*   GLU 98 96   BUN 21 12   CREATININE 0.8 0.8   CALCIUM 9.2 9.6   ANIONGAP 11 13       Significant Imaging: I have reviewed all pertinent imaging results/findings within the past 24 hours.

## 2023-06-05 NOTE — CONSULTS
"  [facility] - Intensive Care  Adult Nutrition  Consult Note    SUMMARY     Recommendations    Follow DASH diet at discharge.  -educational handout attached for print at discharge.   2. Monitor fluid intake.   -educational handout attached for print at discharge.   3. Consider seated exercise to aid in weight maintenance.   -educational handout attached for print at discharge.     Reason for Assessment     Diet education    Nutrition/Diet History    Spiritual, Cultural Beliefs, Latter day Practices, Values that Affect Care: no    Anthropometrics    Temp: 97.8 °F (36.6 °C)  Height Method: Stated  Height: 5' 2" (157.5 cm)  Height (inches): 62 in  Weight Method: Bed Scale  Weight: 102.5 kg (226 lb)  Weight (lb): 226 lb  Ideal Body Weight (IBW), Female: 110 lb  % Ideal Body Weight, Female (lb): 205.45 %  BMI (Calculated): 41.3     Wt Readings from Last 30 Encounters:   06/04/23 102.5 kg (226 lb)   05/23/23 106.6 kg (235 lb 0.2 oz)   09/22/21 119.7 kg (264 lb)   09/22/21 119.7 kg (264 lb)   06/17/21 108 kg (238 lb)   01/19/21 108 kg (238 lb)   10/07/20 110.4 kg (243 lb 6.4 oz)   09/28/20 107.2 kg (236 lb 6.4 oz)   09/24/20 109.8 kg (242 lb)   01/04/20 98.9 kg (218 lb)   12/19/19 105.2 kg (231 lb 14.8 oz)   09/04/18 105.2 kg (232 lb)   ]    Lab/Procedures/Meds     aspirin  81 mg Oral QHS    atorvastatin  40 mg Oral QHS    carvediloL  3.125 mg Oral BID WM    enoxparin  40 mg Subcutaneous Q12H    ertapenem (INVANZ) IVPB  1 g Intravenous Q24H    ferrous sulfate  300 mg Oral Daily    furosemide (LASIX) injection  60 mg Intravenous Q12H    furosemide  60 mg Oral BID    levothyroxine  100 mcg Oral Daily    lisinopriL  10 mg Oral Daily    polyethylene glycol  17 g Oral Daily    potassium chloride  40 mEq Oral Once    potassium chloride  40 mEq Oral Daily    risperiDONE  2 mg Oral QHS    spironolactone  25 mg Oral Daily       Nutrition Prescription Ordered     Low Sodium, 2 gm, 1500 mL fluid restriction    Evaluation of Received " Nutrient/Fluid Intake       % Intake of Estimated Energy Needs: 25 - 50 %  % Meal Intake: 25 - 50 %   absent

## 2023-06-06 NOTE — PROGRESS NOTES
"[facility] Medicine  Progress Note    Patient Name: Radha Alfred  MRN: [id number]  Patient Class: IP- Inpatient   Admission Date: 6/1/2023  Length of Stay: 1 days  Attending Physician: Shonda Argueta MD  Primary Care Provider: FRANSISCA Rodriguez        Subjective:     Principal Problem:Acute on chronic combined systolic and diastolic heart failure        HPI:  Ms. Alfred is a 66yo lady with a past medical history of breast cancer, schizophrenia, hypothyroidism, HTN, DM2, COPD, CHF, and bipolar 1.  His last TTE was 5/17/23 and showed an EF of 45%, diastolic dysfunction, PAP 45 mmHg and mild RV enlargement.    She was recently admitted to [facility] on 5/17/23 to 5/24/23 with CHF exacerbation with hypoxic respiratory failure.  She refused SNF at discharge and took none of her prescribed medications after she went home.  She was not requiring any supplemental O2 at discharge.    Ms. Alfred now comes to the ED after her daughter found her confused with bizarre behavior at home.  When asked why she came to the hospital, she tells me, "because I had to pee."  She has no recollection of the day's events and remains a little withdrawn and confused.  When she got to the ED, she was tangential, agitated and very confused, requiring multiple dose of psychotropic meds (see below) to get her to cooperate with labs and radiographs.  She was PEC'd.  She is able to tell me she has no CP or SOB, but then just states, "I want to go home."      VS in the ED were /63 -> 125/63 (BP Location: Right arm, Patient Position: Lying)   Pulse 78   Temp 98.6 °F (37 °C) (Oral)   Resp 18   Ht 5' 2" (1.575 m)   Wt 99.8 kg (220 lb)   SpO2 90 -> 88 -> 81% RA -> 97% 2L NC  Breastfeeding No   BMI 40.24 kg/m².  Labs showed Hg 8.1, MCV 66, K 2.8, Cr 0.8, TB 1.3, , Trop 0.044, UA: nitrite negative, LE 3+, WBC 25, many bacteria.    ABG RA: pH 7.46, PCO2 49, PAO2 44, sat 81%.    CXR showed the heart is enlarged. "  Primary pulmonary artery segment enlarged.  Prominent lung markings suggesting pulmonary vascular distention and mild perihilar infiltrate could also be present in the right infrahilar region.  Distribution is similar to the prior exam but overall appearing slightly less prominent today.  The cardiomediastinal silhouette appears stable.  Impression:  Chest appearing slightly improved compared to the prior exam suggesting improving CHF.    In the ED she was treated with:  Medications   LORazepam injection 2 mg (2 mg Intramuscular Given 6/1/23 1747)   diphenhydrAMINE injection 50 mg (50 mg Intramuscular Given 6/1/23 1747)   haloperidol lactate injection 10 mg (10 mg Intramuscular Given 6/1/23 1747)   furosemide injection 80 mg (80 mg Intravenous Given 6/1/23 1927)               Overview/Hospital Course:  Discussed patient's condition with her family members, a niece and brother. Discussed recommendations of Psychiatry and primary team that patient receive treatment in inpatient Psychiatric facility once medically stabilized. She is currently refusing antibiotics at times for treatment of ESBL UTI but does not seem to understand the consequences. Initially started on Merrem but changed to Invanz for once daily dosing to hopefully help with administration. PEC hold placed. Patient is medically stable and cleared for discharge to inpatient Psychiatric facility. She will need continued treatment of UTI.      Interval History: patient did agree to treatment of UTI yesterday and did take one dose of risperidone    Review of Systems  Objective:     Vital Signs (Most Recent):  Temp: 97.9 °F (36.6 °C) (06/06/23 0316)  Pulse: 84 (06/06/23 1030)  Resp: 16 (06/06/23 1030)  BP: 138/69 (06/06/23 0630)  SpO2: 99 % (06/06/23 1030) Vital Signs (24h Range):  Temp:  [97.8 °F (36.6 °C)-98.1 °F (36.7 °C)] 97.9 °F (36.6 °C)  Pulse:  [75-95] 84  Resp:  [16-19] 16  SpO2:  [92 %-100 %] 99 %  BP: (112-147)/(57-69) 138/69     Weight: 102.5 kg  (226 lb)  Body mass index is 41.34 kg/m².    Intake/Output Summary (Last 24 hours) at 6/6/2023 1219  Last data filed at 6/6/2023 0459  Gross per 24 hour   Intake 900 ml   Output --   Net 900 ml         Physical Exam  Vitals reviewed.   Cardiovascular:      Rate and Rhythm: Normal rate and regular rhythm.   Pulmonary:      Effort: Pulmonary effort is normal.   Abdominal:      General: There is no distension.   Neurological:      General: No focal deficit present.      Mental Status: She is alert.   Psychiatric:         Attention and Perception: She is inattentive.         Mood and Affect: Affect is labile.         Speech: She is noncommunicative.         Cognition and Memory: Memory is impaired.         Judgment: Judgment is inappropriate.      Comments: Disordered thinking           Significant Labs: All pertinent labs within the past 24 hours have been reviewed.  CMP:   Recent Labs   Lab 06/05/23  0633 06/06/23  0900    139   K 3.5 3.8   CL 95 94*   CO2 31* 30*   GLU 96 82   BUN 12 11   CREATININE 0.8 0.8   CALCIUM 9.6 9.7   ANIONGAP 13 15       Significant Imaging: I have reviewed all pertinent imaging results/findings within the past 24 hours.  X-Ray Chest AP Portable   Final Result      Chest appearing slightly improved compared to the prior exam suggesting improving CHF.         Electronically signed by: Najma Montana MD   Date:    06/01/2023   Time:    16:52              Assessment/Plan:      * Acute on chronic combined systolic and diastolic heart failure  Patient is identified as having Combined Systolic and Diastolic heart failure that is Acute on chronic. CHF is currently uncontrolled due to Continued edema of extremities, Dyspnea not returned to baseline after 1 doses of IV diuretic and Pulmonary edema/pleural effusion on CXR. Latest ECHO performed and demonstrates- Results for orders placed during the hospital encounter of 05/17/23    Echo    Interpretation Summary  · The left ventricle is mildly  enlarged with concentric hypertrophy and mildly decreased systolic function.  · The estimated ejection fraction is 45%.  · Left ventricular diastolic dysfunction.  · Mild right ventricular enlargement.  · Normal central venous pressure (3 mmHg).  · The estimated PA systolic pressure is 45 mmHg.  · Small pericardial effusion.  · There is moderate left ventricular global hypokinesis.  · Moderate left atrial enlargement.       Continue Beta Blocker, ACE/ARB, Furosemide and Aldactone and monitor clinical status closely. Monitor on telemetry. Patient is on CHF pathway.  Monitor strict Is&Os and daily weights.  Place on fluid restriction of 1 L. Continue to stress to patient importance of self efficacy and  on diet for CHF. Last BNP reviewed- and noted below   Recent Labs   Lab 06/01/23  1820   *            [START ON 6/2/2023] carvediloL tablet 3.125 mg, 3.125 mg, Oral, BID WM     [START ON 6/2/2023] furosemide injection 60 mg, 60 mg, Intravenous, Q12H     lisinopriL tablet 10 mg, 10 mg, Oral, Daily     [START ON 6/2/2023] spironolactone tablet 25 mg, 25 mg, Oral, Daily       Patient refusing IV lasix. Also refusing potassium at times. Attempting to regulate psychiatric medications in order to hopefully improve adherence to medication regimen overall. TelePsych recommending inpatient stay for regulation of medications. Patient is a danger to herself because of decompensated psychiatric illness resulting in refusal to adhere to medication regimen        Acute respiratory failure with hypoxia  Continues to require supplemental oxygen though not medically optimized at this time due to difficulties getting patient to comply with medication regimen       Severe obesity (BMI >= 40)  Encourage exercise, weight loss and healthy diet      Acute cystitis without hematuria  ESBL E. Coli UTI  Changed antibiotics to Invanz for ease of dosing as patient is refusing to take any medications for most of her stay due to  acutely decompensated psychiatric illness  Changed to IM dosing 6/5 due to refusal           Hypokalemia  KCl 10 meq iv x 1  KCl 40 meq po x 1  Follow daily  Mg 2.1  Continue replacement PO      Microcytic anemia  Follow up with GI for scopes  Check B12, folate, Fe studies  Start PO Fe for iron deficiency - patient refusing  Continue to monitor H/H daily - 6.8 yesterday but repeat >7.0  Transfuse for Hgb <7.0         Elevated troponin level not due to acute coronary syndrome  This is more representative of type II strain ischemia from CHF  EKG with no acute ischemic changes         [START ON 6/2/2023] aspirin chewable tablet 81 mg, 81 mg, Oral, QHS     [START ON 6/2/2023] aspirin tablet 325 mg, 325 mg, Oral, Once     atorvastatin tablet 40 mg, 40 mg, Oral, QHS     [START ON 6/2/2023] carvediloL tablet 3.125 mg, 3.125 mg, Oral, BID WM         Hypothyroidism  Continue home Synthroid 100mcg po qday  Repeat thyroid panels      Schizoaffective disorder, bipolar type  She was highly confused, agitated and manic in the ED  She had to get Ativan, benadryl and Haldol to finally calm down  TelePsych consulted due to decompensated psychiatric illness   - recommend continuing risperidone 2 mg QHS as long as QTc is <500   - PEC  - inpatient hospitalization at psychiatric facility recommended    QTc <500, will administer Risperdal as long as QTc stays in appropriate range      COPD (chronic obstructive pulmonary disease)  Duonebs q4 prn wheezing and SOB  aerobika       VTE Risk Mitigation (From admission, onward)         Ordered     enoxaparin injection 40 mg  Every 12 hours         06/01/23 2056     Place JAMES hose  Until discontinued         06/01/23 2056     IP VTE HIGH RISK PATIENT  Once         06/01/23 2056     Place sequential compression device  Until discontinued         06/01/23 2056                Discharge Planning   BEE:      Code Status: Full Code   Is the patient medically ready for discharge?:     Reason for  patient still in hospital (select all that apply): Treatment  Discharge Plan A: Home Health                  Shonda Argueta MD  Department of [facility] Medicine   [facility] Intensive [facility]

## 2023-06-06 NOTE — PLAN OF CARE
06/06/23 1228   Medicare Message   Important Message from Medicare regarding Discharge Appeal Rights Given to patient/caregiver;Explained to patient/caregiver;Signed/date by patient/caregiver   Date IMM was signed 06/06/23   Time IMM was signed 1228     Verbal consent received over the phone for IMM.

## 2023-06-06 NOTE — SUBJECTIVE & OBJECTIVE
Interval History: patient did agree to treatment of UTI yesterday and did take one dose of risperidone    Review of Systems  Objective:     Vital Signs (Most Recent):  Temp: 97.9 °F (36.6 °C) (06/06/23 0316)  Pulse: 84 (06/06/23 1030)  Resp: 16 (06/06/23 1030)  BP: 138/69 (06/06/23 0630)  SpO2: 99 % (06/06/23 1030) Vital Signs (24h Range):  Temp:  [97.8 °F (36.6 °C)-98.1 °F (36.7 °C)] 97.9 °F (36.6 °C)  Pulse:  [75-95] 84  Resp:  [16-19] 16  SpO2:  [92 %-100 %] 99 %  BP: (112-147)/(57-69) 138/69     Weight: 102.5 kg (226 lb)  Body mass index is 41.34 kg/m².    Intake/Output Summary (Last 24 hours) at 6/6/2023 1219  Last data filed at 6/6/2023 0459  Gross per 24 hour   Intake 900 ml   Output --   Net 900 ml         Physical Exam  Vitals reviewed.   Cardiovascular:      Rate and Rhythm: Normal rate and regular rhythm.   Pulmonary:      Effort: Pulmonary effort is normal.   Abdominal:      General: There is no distension.   Neurological:      General: No focal deficit present.      Mental Status: She is alert.   Psychiatric:         Attention and Perception: She is inattentive.         Mood and Affect: Affect is labile.         Speech: She is noncommunicative.         Cognition and Memory: Memory is impaired.         Judgment: Judgment is inappropriate.      Comments: Disordered thinking           Significant Labs: All pertinent labs within the past 24 hours have been reviewed.  CMP:   Recent Labs   Lab 06/05/23  0633 06/06/23  0900    139   K 3.5 3.8   CL 95 94*   CO2 31* 30*   GLU 96 82   BUN 12 11   CREATININE 0.8 0.8   CALCIUM 9.6 9.7   ANIONGAP 13 15       Significant Imaging: I have reviewed all pertinent imaging results/findings within the past 24 hours.  X-Ray Chest AP Portable   Final Result      Chest appearing slightly improved compared to the prior exam suggesting improving CHF.         Electronically signed by: Najma Montana MD   Date:    06/01/2023   Time:    16:52

## 2023-06-06 NOTE — PLAN OF CARE
[facility] - Intensive Care  Discharge Reassessment    Primary Care Provider: FRANSISCA Rodriguez    Expected Discharge Date:     Patient's adeola Moreno going to [facility] now to do commitment now. ICU notified. She will call me once it is done.    Reassessment (most recent)       Discharge Reassessment - 06/06/23 1606          Discharge Reassessment    Assessment Type Discharge Planning Reassessment     Did the patient's condition or plan change since previous assessment? No     Discharge Plan discussed with: --   Tiffanie mir [phone]    Communicated BEE with patient/caregiver Date not available/Unable to determine     Discharge Plan A Psychiatric hospital     DME Needed Upon Discharge  none     Transition of Care Barriers Mental illness     Why the patient remains in the hospital Placement issues

## 2023-06-06 NOTE — PLAN OF CARE
Problem: Infection  Goal: Absence of Infection Signs and Symptoms  Outcome: Ongoing, Progressing     Problem: Adult Inpatient Plan of Care  Goal: Plan of Care Review  Outcome: Ongoing, Progressing  Goal: Patient-Specific Goal (Individualized)  Outcome: Ongoing, Progressing  Goal: Absence of Hospital-Acquired Illness or Injury  Outcome: Ongoing, Progressing  Goal: Optimal Comfort and Wellbeing  Outcome: Ongoing, Progressing     Problem: Bariatric Environmental Safety  Goal: Safety Maintained with Care  Outcome: Ongoing, Progressing     Problem: Skin Injury Risk Increased  Goal: Skin Health and Integrity  Outcome: Ongoing, Progressing     Problem: Anxiety  Goal: Anxiety Reduction or Resolution  Outcome: Ongoing, Progressing     Problem: Fall Injury Risk  Goal: Absence of Fall and Fall-Related Injury  Outcome: Ongoing, Progressing     Problem: Self-Care Deficit  Goal: Improved Ability to Complete Activities of Daily Living  Outcome: Ongoing, Progressing     Problem: Sleep Disturbance (Disruptive Behavior)  Goal: Improved Sleep (Disruptive Behavior)  Outcome: Ongoing, Progressing     Problem: Gas Exchange Impaired  Goal: Optimal Gas Exchange  Outcome: Ongoing, Progressing     Problem: Behavior Regulation Impairment (Psychotic Signs/Symptoms)  Goal: Improved Behavioral Control (Psychotic Signs/Symptoms)  Outcome: Ongoing, Progressing     Problem: Cognitive Impairment (Psychotic Signs/Symptoms)  Goal: Optimal Cognitive Function (Psychotic Signs/Symptoms)  Outcome: Ongoing, Progressing     Problem: Decreased Participation and Engagement (Psychotic Signs/Symptoms)  Goal: Increased Participation and Engagement (Psychotic Signs/Symptoms)  Outcome: Ongoing, Progressing     Problem: Mood Impairment (Psychotic Signs/Symptoms)  Goal: Improved Mood Symptoms (Psychotic Signs/Symptoms)  Outcome: Ongoing, Progressing     Problem: Psychomotor Impairment (Psychotic Signs/Symptoms)  Goal: Improved Psychomotor Symptoms (Psychotic  Signs/Symptoms)  Outcome: Ongoing, Progressing     Problem: Sensory Perception Impairment (Psychotic Signs/Symptoms)  Goal: Decreased Sensory Symptoms (Psychotic Signs/Symptoms)  Outcome: Ongoing, Progressing     Problem: Sleep Disturbance (Psychotic Signs/Symptoms)  Goal: Improved Sleep (Psychotic Signs/Symptoms)  Outcome: Ongoing, Progressing     Problem: Social, Occupational or Functional Impairment (Psychotic Signs/Symptoms)  Goal: Enhanced Social, Occupational or Functional Skills (Psychotic Signs/Symptoms)  Outcome: Ongoing, Progressing

## 2023-06-06 NOTE — ASSESSMENT & PLAN NOTE
This is more representative of type II strain ischemia from CHF  EKG with no acute ischemic changes         [START ON 6/2/2023] aspirin chewable tablet 81 mg, 81 mg, Oral, QHS     [START ON 6/2/2023] aspirin tablet 325 mg, 325 mg, Oral, Once     atorvastatin tablet 40 mg, 40 mg, Oral, QHS     [START ON 6/2/2023] carvediloL tablet 3.125 mg, 3.125 mg, Oral, BID WM

## 2023-06-06 NOTE — ASSESSMENT & PLAN NOTE
She was highly confused, agitated and manic in the ED  She had to get Ativan, benadryl and Haldol to finally calm down  TelePsych consulted due to decompensated psychiatric illness   - recommend continuing risperidone 2 mg QHS as long as QTc is <500   - PEC  - inpatient hospitalization at psychiatric facility recommended    QTc <500, will administer Risperdal as long as QTc stays in appropriate range

## 2023-06-06 NOTE — PLAN OF CARE
Problem: Infection  Goal: Absence of Infection Signs and Symptoms  Outcome: Ongoing, Progressing     Problem: Adult Inpatient Plan of Care  Goal: Plan of Care Review  Outcome: Ongoing, Progressing  Goal: Patient-Specific Goal (Individualized)  Outcome: Ongoing, Progressing  Goal: Absence of Hospital-Acquired Illness or Injury  Outcome: Ongoing, Progressing  Goal: Optimal Comfort and Wellbeing  Outcome: Ongoing, Progressing  Goal: Readiness for Transition of Care  Outcome: Ongoing, Progressing     Problem: Bariatric Environmental Safety  Goal: Safety Maintained with Care  Outcome: Ongoing, Progressing     Problem: Skin Injury Risk Increased  Goal: Skin Health and Integrity  Outcome: Ongoing, Progressing     Problem: Anxiety  Goal: Anxiety Reduction or Resolution  Outcome: Ongoing, Progressing     Problem: Fall Injury Risk  Goal: Absence of Fall and Fall-Related Injury  Outcome: Ongoing, Progressing     Problem: Self-Care Deficit  Goal: Improved Ability to Complete Activities of Daily Living  Outcome: Ongoing, Progressing     Problem: Behavior Regulation Impairment (Disruptive Behavior)  Goal: Improved Impulse and Aggression Control (Disruptive Behavior)  Outcome: Ongoing, Progressing     Problem: Mood Impairment (Disruptive Behavior)  Goal: Improved Mood Symptoms (Disruptive Behavior)  Outcome: Ongoing, Progressing     Problem: Sleep Disturbance (Disruptive Behavior)  Goal: Improved Sleep (Disruptive Behavior)  Outcome: Ongoing, Progressing     Problem: Social, Occupational or Functional Impairment (Disruptive Behavior)  Goal: Enhanced Social, Occupational or Functional Skills (Disruptive Behavior)  Outcome: Ongoing, Progressing     Problem: Manic Behavior Episode  Goal: Decreased Manic Symptoms  Outcome: Ongoing, Progressing     Problem: Gas Exchange Impaired  Goal: Optimal Gas Exchange  Outcome: Ongoing, Progressing     Problem: Behavior Regulation Impairment (Psychotic Signs/Symptoms)  Goal: Improved Behavioral  Control (Psychotic Signs/Symptoms)  Outcome: Ongoing, Progressing     Problem: Cognitive Impairment (Psychotic Signs/Symptoms)  Goal: Optimal Cognitive Function (Psychotic Signs/Symptoms)  Outcome: Ongoing, Progressing     Problem: Decreased Participation and Engagement (Psychotic Signs/Symptoms)  Goal: Increased Participation and Engagement (Psychotic Signs/Symptoms)  Outcome: Ongoing, Progressing     Problem: Mood Impairment (Psychotic Signs/Symptoms)  Goal: Improved Mood Symptoms (Psychotic Signs/Symptoms)  Outcome: Ongoing, Progressing     Problem: Psychomotor Impairment (Psychotic Signs/Symptoms)  Goal: Improved Psychomotor Symptoms (Psychotic Signs/Symptoms)  Outcome: Ongoing, Progressing     Problem: Sensory Perception Impairment (Psychotic Signs/Symptoms)  Goal: Decreased Sensory Symptoms (Psychotic Signs/Symptoms)  Outcome: Ongoing, Progressing     Problem: Sleep Disturbance (Psychotic Signs/Symptoms)  Goal: Improved Sleep (Psychotic Signs/Symptoms)  Outcome: Ongoing, Progressing     Problem: Social, Occupational or Functional Impairment (Psychotic Signs/Symptoms)  Goal: Enhanced Social, Occupational or Functional Skills (Psychotic Signs/Symptoms)  Outcome: Ongoing, Progressing   POC reviewed at bedside. Questions and concerns addressed. VSS. Placed bed in low and locked position. Call light within reach. Side rails up x2. Instructed to call for any needs. Verbalized understanding of all instructions. Frequent rounds.

## 2023-06-06 NOTE — PLAN OF CARE
"   06/06/23 1133   Medicare Message   Important Message from Medicare regarding Discharge Appeal Rights Other (comments)     Explained IMM to patient. She refused to sign as she "is enjoying her hospital stay here." Tried to explain again to her that it has to do with her medicare rights regarding discharge. She said she understands what it is but she isn't signing it as she "is enjoying her hospital stay her."  "

## 2023-06-06 NOTE — PLAN OF CARE
[facility] - Intensive Care  Discharge Reassessment    Primary Care Provider: FRANSISCA Rodriguez    Expected Discharge Date:     Called patient's niece Tiffanie Garcias at [phone]. States patient lives alone & can't return to her apartment until it's treated for bed bugs which won't be until . Her 72hr hold will be up this afternoon. She states she doesn't want to have to do the commitment as doesn't want her to be placed in a holding cell until bed is available. She won't be able to do it today but could possibly do it tomorrow. She did provide me with patient's brother Danie Alfred [phone] to use as 2nd contact. States her mom Mariam Garcias took care of patient & when she  last year she has been the one assisting in her care. Will keep her updated. Will call [facility] to see if they could treat the bed bugs sooner than .       Reassessment (most recent)       Discharge Reassessment - 23 1235          Discharge Reassessment    Assessment Type Discharge Planning Reassessment     Did the patient's condition or plan change since previous assessment? No     Discharge Plan discussed with: --   Tiffanie mir [phone]    Communicated BEE with patient/caregiver No     Discharge Plan A Psychiatric hospital     DME Needed Upon Discharge  none     Transition of Care Barriers Mental illness     Why the patient remains in the hospital Placement issues        Post-Acute Status    Discharge Delays None known at this time

## 2023-06-07 NOTE — PLAN OF CARE
Problem: Gas Exchange Impaired  Goal: Optimal Gas Exchange  Outcome: Ongoing, Progressing  Intervention: Optimize Oxygenation and Ventilation  Flowsheets (Taken 6/7/2023 0750)  Airway/Ventilation Management: airway patency maintained   Patient in no apparent distress. Sat's 100  % on 1 lpm. Placed on room air. PRN treatments not needed . Will continue to monitor.

## 2023-06-07 NOTE — SUBJECTIVE & OBJECTIVE
Interval History: Taking final dose of abx this am.    Review of Systems   All other systems reviewed and are negative.  Objective:     Vital Signs (Most Recent):  Temp: 97.6 °F (36.4 °C) (06/07/23 0748)  Pulse: 84 (06/07/23 0748)  Resp: 20 (06/07/23 0748)  BP: (!) 133/53 (06/07/23 0748)  SpO2: 95 % (06/07/23 0923) Vital Signs (24h Range):  Temp:  [97.6 °F (36.4 °C)-98.1 °F (36.7 °C)] 97.6 °F (36.4 °C)  Pulse:  [75-95] 84  Resp:  [15-21] 20  SpO2:  [89 %-100 %] 95 %  BP: (111-152)/(52-71) 133/53     Weight: 100 kg (220 lb 6.4 oz)  Body mass index is 40.31 kg/m².    Intake/Output Summary (Last 24 hours) at 6/7/2023 0929  Last data filed at 6/7/2023 0318  Gross per 24 hour   Intake 480 ml   Output --   Net 480 ml         Physical Exam      Vitals reviewed  General: NAD, Obese  Head: NC/AT  Eyes: EOMI, HONEY  Cardiovascular: Pulses intact distally, Regular Rate   Pulmonary: Normal Respiratory Rate, No respiratory distress  Gi: Soft, Non-tender  Extremities: Warm, No edema present  Skin: Warm, dry  Neuro: Alert, Oriented x3, No focal Deficit  Psych: Anxious, Paranoid, Thought process does not appear linear. Mood appears appropriate. Speech nonpressured.      Significant Labs: All pertinent labs within the past 24 hours have been reviewed.    Significant Imaging: I have reviewed all pertinent imaging results/findings within the past 24 hours.

## 2023-06-07 NOTE — ASSESSMENT & PLAN NOTE
ESBL E. Coli UTI  Changed antibiotics to Invanz for ease of dosing as patient is refusing to take any medications for most of her stay due to acutely decompensated psychiatric illness  Final dose today

## 2023-06-07 NOTE — ASSESSMENT & PLAN NOTE
This is more representative of type II strain ischemia from CHF  EKG with no acute ischemic changes

## 2023-06-07 NOTE — PLAN OF CARE
"[facility] - Intensive Care  Discharge Final Note    Primary Care Provider: FRANSISCA Rodriguez    Expected Discharge Date: 6/7/2023    Per Nehal with [facility]' office order was received & waiting for someone to come pick her up. She is hoping it will happen within the hour. She will be going to the holding cell as [facility] denied her as "too medical". Patient's niece Tiffanie Garcias notified of process. No other needs at this time.     Final Discharge Note (most recent)       Final Note - 06/07/23 1456          Final Note    Assessment Type Final Discharge Note     Anticipated Discharge Disposition Law Enforcement     What phone number can be called within the next 1-3 days to see how you are doing after discharge? [phone]        Post-Acute Status    Discharge Delays None known at this time                     Important Message from Medicare  Important Message from Medicare regarding Discharge Appeal Rights: Given to patient/caregiver, Explained to patient/caregiver, Signed/date by patient/caregiver     Date IMM was signed: 06/06/23  Time IMM was signed: 1228        "

## 2023-06-07 NOTE — ASSESSMENT & PLAN NOTE
Patient is identified as having Combined Systolic and Diastolic heart failure that is Acute on chronic. CHF is currently uncontrolled due to Continued edema of extremities, Dyspnea not returned to baseline after 1 doses of IV diuretic and Pulmonary edema/pleural effusion on CXR. Latest ECHO performed and demonstrates- Results for orders placed during the hospital encounter of 05/17/23    Echo    Interpretation Summary  · The left ventricle is mildly enlarged with concentric hypertrophy and mildly decreased systolic function.  · The estimated ejection fraction is 45%.  · Left ventricular diastolic dysfunction.  · Mild right ventricular enlargement.  · Normal central venous pressure (3 mmHg).  · The estimated PA systolic pressure is 45 mmHg.  · Small pericardial effusion.  · There is moderate left ventricular global hypokinesis.  · Moderate left atrial enlargement.       Continue Beta Blocker, ACE/ARB, Furosemide and Aldactone and monitor clinical status closely. Monitor on telemetry. Patient is on CHF pathway.  Monitor strict Is&Os and daily weights.  Place on fluid restriction of 1 L. Continue to stress to patient importance of self efficacy and  on diet for CHF. Last BNP reviewed- and noted below   Recent Labs   Lab 06/01/23  1820   *          Started GDMT during admission but patient intermittently refusing.    Patient refusing IV lasix. Also refusing potassium at times. Attempting to regulate psychiatric medications in order to hopefully improve adherence to medication regimen overall. TelePsych recommending inpatient stay for regulation of medications. Patient is a danger to herself because of decompensated psychiatric illness resulting in refusal to adhere to medication regimen

## 2023-06-07 NOTE — PT/OT/SLP EVAL
Occupational Therapy   Treatment    Name: Radha Alfred  MRN: [id number]  Admitting Diagnosis:  Acute on chronic combined systolic and diastolic heart failure       Recommendations:     Discharge Recommendations:  Skilled nursing facility vs home with home health  Discharge Equipment Recommendations:   Rolling walker  Barriers to discharge:   None    Assessment:   Ms. Alfred is a 68yo lady with a past medical history of breast cancer, schizophrenia, hypothyroidism, HTN, DM2, COPD, CHF, and bipolar 1.  His last TTE was 5/17/23 and showed an EF of 45%, diastolic dysfunction, PAP 45 mmHg and mild RV enlargement.     She was recently admitted to [facility] on 5/17/23 to 5/24/23 with CHF exacerbation with hypoxic respiratory failure.  She refused SNF at discharge and took none of her prescribed medications after she went home.  She was not requiring any supplemental O2 at discharge.       Rehab Prognosis:  Good      Plan:     Patient to be seen   to address the above listed problems via    Plan of Care Expires:  When patient is discharged.  Patient will be treated 3-5x per week.  Plan of Care Reviewed with:  Patient    Subjective      Pain/Comfort:   No pain    Objective:     Communicated with: OT spoke with patient's nurse prior to entering patient's room for session.    General Precautions: Standard,      Orthopedic Precautions:   Braces:    Respiratory Status: 2L  O2     Occupational Performance:     Bed Mobility:    Not assessed secondary to patient sitting at edge of bed prior to and at the conclusion of session.     Functional Mobility/Transfers:  Patient requires SBA with functional transfers and when ambulating 100 feet with RW.    Activities of Daily Living:  Patient requires CGA with ADLs.        GOALS:   See initial evaluation for goals.    Time Tracking:     OT Date of Treatment:  6/6/2023  OT Start Time:  10:00  OT Stop Time:  10:30  OT Total Time (min):  30 minutes    Cruzito Tai OTR/L

## 2023-06-07 NOTE — NURSING
[facility] deputy here to  patient. Pt dressed and transported off floor via wheelchair with necklace, cell phone and . Notified pt's niece of transport. Niece states she has the patient's wallet with her.

## 2023-06-07 NOTE — PT/OT/SLP PROGRESS
"Occupational Therapy   Treatment    Name: Radha Alfred  MRN: [id number]  Admitting Diagnosis:  Acute on chronic combined systolic and diastolic heart failure       Recommendations:     Discharge Recommendations:  Home with home health   Discharge Equipment Recommendations:   Rolling walker  Barriers to discharge:   None    Assessment:   Ms. Alfred is a 68yo lady with a past medical history of breast cancer, schizophrenia, hypothyroidism, HTN, DM2, COPD, CHF, and bipolar 1.  His last TTE was 5/17/23 and showed an EF of 45%, diastolic dysfunction, PAP 45 mmHg and mild RV enlargement.     She was recently admitted to [facility] on 5/17/23 to 5/24/23 with CHF exacerbation with hypoxic respiratory failure.  She refused SNF at discharge and took none of her prescribed medications after she went home.  She was not requiring any supplemental O2 at discharge.     Ms. Alfred now comes to the ED after her daughter found her confused with bizarre behavior at home.  When asked why she came to the hospital, she tells me, "because I had to pee."  She has no recollection of the day's events and remains a little withdrawn and confused.  When she got to the ED, she was tangential, agitated and very confused, requiring multiple dose of psychotropic meds (see below) to get her to cooperate with labs and radiographs.  She was PEC'd.  She is able to tell me she has no CP or SOB, but then just states, "I want to go home."     Radha Alfred is a 67 y.o. female admitted with a medical diagnosis of Acute on chronic combined systolic and diastolic heart failure. She presents with the following impairments/functional limitations: weakness, impaired endurance, impaired self care skills, impaired functional mobility, impaired balance, impaired cardiopulmonary response to activity.        Rehab Prognosis:  Good      Plan:     Patient to be seen   to address the above listed problems via    Plan of Care Expires:  When patient is discharged from hospital.  " Patient will be treated 3-5x per week while hospitalized.  Plan of Care Reviewed with:  Patient     Subjective     Pain/Comfort:   No pain     Objective:     Communicated with: OT spoke with patient's nurse prior to entering patient's room for session.  Patient on 1:1 nurse care.    General Precautions: Standard,      Orthopedic Precautions:   Braces:    Respiratory Status: Room air     Occupational Performance:     Bed Mobility:    Not assessed on this date secondary to patient sitting on couch prior to and at the conclusion of session.     Functional Mobility/Transfers:  Patient is modified independent with functional transfer and when ambulating 125 feet with rolling walker.    Activities of Daily Living:  Patient requires SBA with ADLs.      Treatment & Education:  Patient tolerated skilled OT session well on  this date.  Patient is modified independent when performing functional transfer and when ambulating 125 feet with RW.      GOALS: See initial evaluation for goals.      Time Tracking:     OT Date of Treatment:  6/7/2023  OT Start Time:  10:45  OT Stop Time:  11:00  OT Total Time (min):  15 minutes     Cruzito Tai OTR/L               6/7/2023

## 2023-06-07 NOTE — PLAN OF CARE
[facility] - Intensive Care  Discharge Reassessment    Primary Care Provider: FRANSISCA Rodriguez    Expected Discharge Date: 6/7/2023    Commitment has been done by patient's niece. Spoke to Nehal with [facility] who states they are waiting on order & rit before she will be picked up. She is hoping she will be able to go straight to [facility]. Patient, brother & niece notified that commitment was done & waiting on next step before she will go. Will keep niece updated to process. No other needs at this time.      Reassessment (most recent)       Discharge Reassessment - 06/07/23 1245          Discharge Reassessment    Assessment Type Discharge Planning Reassessment     Did the patient's condition or plan change since previous assessment? Yes     Discharge Plan discussed with: Caregiver     Name(s) and Number(s) Tiffanie mir [phone]     Communicated BEE with patient/caregiver Yes     Discharge Plan A Psychiatric hospital     DME Needed Upon Discharge  none     Transition of Care Barriers None     Why the patient remains in the hospital Other (see comment)   commitment done by adeola; waiting on orders & rit before patient will be picked up       Post-Acute Status    Discharge Delays [facility] Arranged Transportation

## 2023-06-09 NOTE — DISCHARGE SUMMARY
"[facility] Medicine  Discharge Summary      Patient Name: Radha Alfred  MRN: [id number]  KARENA: [id number]  Patient Class: IP- Inpatient  Admission Date: 6/1/2023  Hospital Length of Stay: 2 days  Discharge Date and Time: 6/7/2023  5:45 PM  Attending Physician: No att. providers found   Discharging Provider: Yariel Keane MD  Primary Care Provider: FRANSISCA Rodriguez    Primary Care Team: Networked reference to record PCT     HPI:   Ms. Alfred is a 68yo lady with a past medical history of breast cancer, schizophrenia, hypothyroidism, HTN, DM2, COPD, CHF, and bipolar 1.  His last TTE was 5/17/23 and showed an EF of 45%, diastolic dysfunction, PAP 45 mmHg and mild RV enlargement.    She was recently admitted to [facility] on 5/17/23 to 5/24/23 with CHF exacerbation with hypoxic respiratory failure.  She refused SNF at discharge and took none of her prescribed medications after she went home.  She was not requiring any supplemental O2 at discharge.    Ms. Alfred now comes to the ED after her daughter found her confused with bizarre behavior at home.  When asked why she came to the hospital, she tells me, "because I had to pee."  She has no recollection of the day's events and remains a little withdrawn and confused.  When she got to the ED, she was tangential, agitated and very confused, requiring multiple dose of psychotropic meds (see below) to get her to cooperate with labs and radiographs.  She was PEC'd.  She is able to tell me she has no CP or SOB, but then just states, "I want to go home."      VS in the ED were /63 -> 125/63 (BP Location: Right arm, Patient Position: Lying)   Pulse 78   Temp 98.6 °F (37 °C) (Oral)   Resp 18   Ht 5' 2" (1.575 m)   Wt 99.8 kg (220 lb)   SpO2 90 -> 88 -> 81% RA -> 97% 2L NC  Breastfeeding No   BMI 40.24 kg/m².  Labs showed Hg 8.1, MCV 66, K 2.8, Cr 0.8, TB 1.3, , Trop 0.044, UA: nitrite negative, LE 3+, WBC 25, many bacteria.    ABG " RA: pH 7.46, PCO2 49, PAO2 44, sat 81%.    CXR showed the heart is enlarged.  Primary pulmonary artery segment enlarged.  Prominent lung markings suggesting pulmonary vascular distention and mild perihilar infiltrate could also be present in the right infrahilar region.  Distribution is similar to the prior exam but overall appearing slightly less prominent today.  The cardiomediastinal silhouette appears stable.  Impression:  Chest appearing slightly improved compared to the prior exam suggesting improving CHF.    In the ED she was treated with:  Medications   LORazepam injection 2 mg (2 mg Intramuscular Given 6/1/23 1747)   diphenhydrAMINE injection 50 mg (50 mg Intramuscular Given 6/1/23 1747)   haloperidol lactate injection 10 mg (10 mg Intramuscular Given 6/1/23 1747)   furosemide injection 80 mg (80 mg Intravenous Given 6/1/23 1927)               * No surgery found *      Hospital Course:   Patient admitted for acute hypoxic respiratory failure 2/2 acute on chronic HFrEF exacerbation requiring IV diuresis and acute decompensation of psychiatric illness. Patient PEC'd. Inpatient psych consulted who recommend inpatient psych placement. Discussed patient's condition with her family members, a niece and brother. Discussed recommendations of Psychiatry and primary team that patient receive treatment in inpatient Psychiatric facility once medically stabilized. Initially started on Merrem for ESBL UTI but changed to Invanz for once daily dosing to help with administration. Patient now euvolemic and not on O2. She intermittently refuses her HF medications which will continue to be an issue for Ms. Alfred as she does not seem to understand the need to take these medications to prevent further exacerbations. Patient is medically stable and cleared for discharge to inpatient Psychiatric facility. Finished abx for UTI prior to discharge       Goals of Care Treatment Preferences:  Code Status: Full Code      Consults:    Consults (From admission, onward)        Status Ordering Provider     Inpatient consult to [facility] - [facility]  Once        Provider:  Joseph Young MD    Completed ADOLPH OLIVEIRA     Inpatient consult to Registered Dietitian/Nutritionist  Once        Provider:  (Not yet assigned)    LEONIDAS Ibanez          No new Assessment & Plan notes have been filed under this hospital service since the last note was generated.  Service: Hospital Medicine    Final Active Diagnoses:    Diagnosis Date Noted POA    PRINCIPAL PROBLEM:  Acute on chronic combined systolic and diastolic heart failure [I50.43] 05/17/2023 Yes    Hypokalemia [E87.6] 06/01/2023 Yes    Acute cystitis without hematuria [N30.00] 06/01/2023 Yes    Severe obesity (BMI >= 40) [E66.01] 06/01/2023 Yes    Acute respiratory failure with hypoxia [J96.01] 06/01/2023 Yes    Microcytic anemia [D50.9] 05/20/2023 Yes    COPD (chronic obstructive pulmonary disease) [J44.9] 05/17/2023 Yes    Schizoaffective disorder, bipolar type [F25.0] 05/17/2023 Yes    Elevated troponin level not due to acute coronary syndrome [R77.8] 05/17/2023 Yes    Hypothyroidism [E03.9] 05/17/2023 Yes      Problems Resolved During this Admission:    Diagnosis Date Noted Date Resolved POA    CHF (congestive heart failure) [I50.9] 06/01/2023 06/01/2023 Unknown       Discharged Condition: good    Disposition: Psychiatric Hospital    Follow Up:    Patient Instructions:      Diet Cardiac     Call MD for:  temperature >100.4     Call MD for:  persistent nausea and vomiting or diarrhea     Call MD for:  severe uncontrolled pain     Call MD for:  redness, tenderness, or signs of infection (pain, swelling, redness, odor or green/yellow discharge around incision site)     Call MD for:  difficulty breathing or increased cough     Call MD for:  severe persistent headache     Call MD for:  worsening rash     Call MD for:  persistent dizziness, light-headedness, or visual  disturbances     Call MD for:  increased confusion or weakness       Significant Diagnostic Studies: N/A    Pending Diagnostic Studies:     None         Medications:  Reconciled Home Medications:      Medication List      START taking these medications    carvediloL 3.125 MG tablet  Commonly known as: COREG  Take 1 tablet (3.125 mg total) by mouth 2 (two) times daily with meals.     INV lisinopril 5 MG Tab  Commonly known as: PRINIVIL,ZESTRIL  Take 1 tablet (5 mg total) by mouth once daily.     spironolactone 25 MG tablet  Commonly known as: ALDACTONE  Take 1 tablet (25 mg total) by mouth once daily.        CONTINUE taking these medications    albuterol sulfate 90 mcg/actuation Aebs  Inhale into the lungs.     furosemide 40 MG tablet  Commonly known as: LASIX  Take 1 tablet (40 mg total) by mouth 2 (two) times a day.     levothyroxine 100 MCG tablet  Commonly known as: SYNTHROID  Take 1 tablet (100 mcg total) by mouth once daily.     lovastatin 10 MG tablet  Commonly known as: MEVACOR  Take 1 tablet (10 mg total) by mouth every evening.     risperiDONE 2 MG tablet  Commonly known as: RISPERDAL  Take 2 mg by mouth every evening.            Indwelling Lines/Drains at time of discharge:   Lines/Drains/Airways     None                 Time spent on the discharge of patient: 25 minutes         Yariel Keane MD  Department of Hospital Medicine  [facility] Intensive Care

## 2023-09-04 PROBLEM — J96.01 ACUTE RESPIRATORY FAILURE WITH HYPOXIA: Status: RESOLVED | Noted: 2023-01-01 | Resolved: 2023-01-01

## 2023-09-18 NOTE — PLAN OF CARE
Problem: Adult Inpatient Plan of Care  Goal: Plan of Care Review  5/24/2023 0254 by Joyce Lares RN  Outcome: Ongoing, Progressing  5/23/2023 2328 by Joyce Lares RN  Outcome: Ongoing, Progressing  Goal: Patient-Specific Goal (Individualized)  5/24/2023 0254 by Joyce Lares RN  Outcome: Ongoing, Progressing  5/23/2023 2328 by Joyce Lares RN  Outcome: Ongoing, Progressing  Goal: Absence of Hospital-Acquired Illness or Injury  5/24/2023 0254 by Joyce Lares RN  Outcome: Ongoing, Progressing  5/23/2023 2328 by Joyce Lares RN  Outcome: Ongoing, Progressing  Goal: Optimal Comfort and Wellbeing  5/24/2023 0254 by Joyce Lares RN  Outcome: Ongoing, Progressing  5/23/2023 2328 by Joyce Lares RN  Outcome: Ongoing, Progressing  Goal: Readiness for Transition of Care  5/24/2023 0254 by Joyce Lares RN  Outcome: Ongoing, Progressing  5/23/2023 2328 by Joyce Lares RN  Outcome: Ongoing, Progressing     Problem: Bariatric Environmental Safety  Goal: Safety Maintained with Care  5/24/2023 0254 by Joyce Lares RN  Outcome: Ongoing, Progressing  5/23/2023 2328 by Joyce Lares RN  Outcome: Ongoing, Progressing     Problem: Skin Injury Risk Increased  Goal: Skin Health and Integrity  5/24/2023 0254 by Joyce Lares RN  Outcome: Ongoing, Progressing  5/23/2023 2328 by Joyce Lares RN  Outcome: Ongoing, Progressing     Problem: Adjustment to Illness (Heart Failure)  Goal: Optimal Coping  5/24/2023 0254 by Joyce Lares RN  Outcome: Ongoing, Progressing  5/23/2023 2328 by Joyce Lares RN  Outcome: Ongoing, Progressing     Problem: Cardiac Output Decreased (Heart Failure)  Goal: Optimal Cardiac Output  5/24/2023 0254 by Joyce Lares RN  Outcome: Ongoing, Progressing  5/23/2023 2328 by Joyce Lares RN  Outcome: Ongoing, Progressing     Problem: Dysrhythmia (Heart Failure)  Goal: Stable Heart Rate and Rhythm  5/24/2023 0254 by Joyce Lares RN  Outcome: Ongoing, Progressing  5/23/2023 2328 by Joyce Lares RN  Outcome:  Ongoing, Progressing     Problem: Fluid Imbalance (Heart Failure)  Goal: Fluid Balance  5/24/2023 0254 by Joyce Lares RN  Outcome: Ongoing, Progressing  5/23/2023 2328 by Joyce Lares RN  Outcome: Ongoing, Progressing     Problem: Functional Ability Impaired (Heart Failure)  Goal: Optimal Functional Ability  5/24/2023 0254 by Joyce Lares RN  Outcome: Ongoing, Progressing  5/23/2023 2328 by Joyce Lares RN  Outcome: Ongoing, Progressing     Problem: Oral Intake Inadequate (Heart Failure)  Goal: Optimal Nutrition Intake  5/24/2023 0254 by Joyce Lares RN  Outcome: Ongoing, Progressing  5/23/2023 2328 by Joyce Lares RN  Outcome: Ongoing, Progressing     Problem: Respiratory Compromise (Heart Failure)  Goal: Effective Oxygenation and Ventilation  5/24/2023 0254 by Joyce Lares RN  Outcome: Ongoing, Progressing  5/23/2023 2328 by Joyce Lares RN  Outcome: Ongoing, Progressing     Problem: Sleep Disordered Breathing (Heart Failure)  Goal: Effective Breathing Pattern During Sleep  5/24/2023 0254 by Joyce Lares RN  Outcome: Ongoing, Progressing  5/23/2023 2328 by Joyce Lares RN  Outcome: Ongoing, Progressing     Problem: Fall Injury Risk  Goal: Absence of Fall and Fall-Related Injury  5/24/2023 0254 by Joyce Lares RN  Outcome: Ongoing, Progressing  5/23/2023 2328 by Joyce Lares RN  Outcome: Ongoing, Progressing     Problem: Infection  Goal: Absence of Infection Signs and Symptoms  5/24/2023 0254 by Joyce Lares RN  Outcome: Ongoing, Progressing  5/23/2023 2328 by Joyce Lares RN  Outcome: Ongoing, Progressing   POC reviewed at bedside. Questions and concerns addressed. VSS. Placed bed in low and locked position. Call light within reach. Side rails up x2. Instructed to call for any needs. Verbalized understanding of all instructions. Frequent rounds.    unknown

## 2023-11-06 PROBLEM — D64.9 ACUTE ANEMIA: Status: ACTIVE | Noted: 2023-01-01

## 2023-11-06 PROBLEM — I50.42 CHRONIC COMBINED SYSTOLIC AND DIASTOLIC HEART FAILURE: Status: ACTIVE | Noted: 2023-01-01

## 2023-11-06 NOTE — ED PROVIDER NOTES
"Encounter Date: 11/5/2023       History     Chief Complaint   Patient presents with    Shortness of Breath     Since last night. PMH COPD and CHF     Pt with hx of COPD and CHF here with c/o sob since last night but worse today. No CP. Mild cough. Not on home O2. +OP/PND.     The history is provided by the patient.   Shortness of Breath  This is a recurrent problem. The current episode started yesterday. The problem has been gradually worsening. Associated symptoms include cough, wheezing, PND, orthopnea and leg swelling. Pertinent negatives include no fever, no headaches, no coryza, no rhinorrhea, no sore throat, no swollen glands, no ear pain, no neck pain, no sputum production, no hemoptysis, no chest pain, no syncope, no vomiting, no abdominal pain, no rash, no leg pain and no claudication. It is unknown what precipitated the problem. She has tried beta-agonist inhalers for the symptoms. The treatment provided no relief. She has had Prior hospitalizations. She has had Prior ED visits. She has had No prior ICU admissions. Associated medical issues include COPD and heart failure.     Review of patient's allergies indicates:   Allergen Reactions    Cortisone      Other reaction(s): "breaks me out"    Iodine Hives    Iodine and iodide containing products      Past Medical History:   Diagnosis Date    Anxiety     Bipolar 1 disorder     Breast cancer     CHF (congestive heart failure)     COPD (chronic obstructive pulmonary disease)     Depression     Diabetes mellitus     Hypertension     PVD (peripheral vascular disease)     Schizophrenia     Thyroid disease      Past Surgical History:   Procedure Laterality Date    BREAST LUMPECTOMY      TONSILLECTOMY       Family History   Problem Relation Age of Onset    Lung cancer Mother     Breast cancer Neg Hx      Social History     Tobacco Use    Smoking status: Former     Types: Cigarettes     Passive exposure: Never    Smokeless tobacco: Never   Substance Use Topics    " Alcohol use: Not Currently    Drug use: Never     Review of Systems   Constitutional:  Negative for fever.   HENT:  Negative for ear pain, rhinorrhea and sore throat.    Respiratory:  Positive for cough, shortness of breath and wheezing. Negative for hemoptysis and sputum production.    Cardiovascular:  Positive for orthopnea, leg swelling and PND. Negative for chest pain, claudication and syncope.   Gastrointestinal:  Negative for abdominal pain and vomiting.   Musculoskeletal:  Negative for neck pain.   Skin:  Negative for rash.   Neurological:  Negative for headaches.   All other systems reviewed and are negative.      Physical Exam     Initial Vitals   BP Pulse Resp Temp SpO2   11/05/23 1946 11/05/23 1947 11/05/23 1947 11/05/23 1947 11/05/23 1947   129/63 105 (!) 30 99 °F (37.2 °C) 98 %      MAP       --                Physical Exam    Nursing note and vitals reviewed.  Constitutional: She appears well-developed and well-nourished. She is not diaphoretic. She appears distressed.   Mod distress from SOB. Anxious   HENT:   Mouth/Throat: Oropharynx is clear and moist.   Eyes: No scleral icterus.   Slightly pale conjunctiva   Neck: Neck supple. No JVD present.   Normal range of motion.  Cardiovascular:  Regular rhythm, normal heart sounds and intact distal pulses.           tachy   Pulmonary/Chest: She exhibits no tenderness.   Scant exp wheezing with fair air movement. Increased WoB.   Abdominal: Abdomen is soft. There is no abdominal tenderness.   Musculoskeletal:         General: No tenderness. Normal range of motion.      Cervical back: Normal range of motion and neck supple.      Comments: Mild brawny edema to both lower legs. CVI changes     Neurological: She is alert and oriented to person, place, and time. GCS score is 15. GCS eye subscore is 4. GCS verbal subscore is 5. GCS motor subscore is 6.   Skin: Skin is warm and dry. Capillary refill takes less than 2 seconds. No rash noted. No erythema. There is  pallor.   Psychiatric:   anxious         ED Course   Procedures  Labs Reviewed   B-TYPE NATRIURETIC PEPTIDE - Abnormal; Notable for the following components:       Result Value     (*)     All other components within normal limits   CBC W/ AUTO DIFFERENTIAL - Abnormal; Notable for the following components:    RBC 2.98 (*)     Hemoglobin 4.5 (*)     Hematocrit 18.7 (*)     MCV 63 (*)     MCH 15.1 (*)     MCHC 24.1 (*)     RDW 25.1 (*)     MPV 8.8 (*)     Gran # (ANC) 7.8 (*)     nRBC 1 (*)     Gran % 77.1 (*)     Lymph % 14.6 (*)     All other components within normal limits    Narrative:     H&H critical result(s) called and verbal readback obtained from Julia Batres RN.  by TH3 11/05/2023 20:30   COMPREHENSIVE METABOLIC PANEL - Abnormal; Notable for the following components:    Glucose 113 (*)     BUN 28 (*)     eGFR 44.5 (*)     All other components within normal limits   OCCULT BLOOD X 1, STOOL - Abnormal; Notable for the following components:    Occult Blood Positive (*)     All other components within normal limits   TROPONIN I   MAGNESIUM   TYPE & SCREEN   PREPARE RBC SOFT     EKG Readings: (Independently Interpreted)   Rhythm: Sinus Tachycardia. Ectopy: PVCs. Conduction: Normal. ST Segments: Normal ST Segments. T Waves: Normal. Axis: Left Axis Deviation. Clinical Impression: Sinus Tachycardia with PVCs       Imaging Results              X-Ray Chest AP Portable (In process)                      Medications   0.9%  NaCl infusion (for blood administration) (has no administration in time range)   albuterol nebulizer solution 2.5 mg (has no administration in time range)   furosemide injection 40 mg (has no administration in time range)   albuterol-ipratropium 2.5 mg-0.5 mg/3 mL nebulizer solution 3 mL (3 mLs Nebulization Given 11/1955)   LORazepam injection 1 mg (1 mg Intravenous Given 11/5/23 2025)     Medical Decision Making  Pt with COPD, CHF and anemia presented with sob and cough c/w COPD  exacerbation. Improved with neb and O2. H/H 4/18. Heme pos brown stool. Transfused 1U. Neg CXR. BNP slightly elevated at 540. Neg troponin. Pt will need transferred for GI eval and treatment of her COPD and severe anemia. CMP and Mg unremarkable. Nonischemic EKG. Pt much improved and stable for transfer.     Amount and/or Complexity of Data Reviewed  External Data Reviewed: notes.     Details: Last echo in May showed EF 45%.  Labs: ordered. Decision-making details documented in ED Course.  Radiology: ordered and independent interpretation performed. Decision-making details documented in ED Course.  ECG/medicine tests: ordered and independent interpretation performed. Decision-making details documented in ED Course.    Risk  Prescription drug management.    Critical Care  Total time providing critical care: 0 minutes               ED Course as of 11/05/23 2131   Sun Nov 05, 2023 2037 Pt reports hx of anemia but is not sure why. She denies rectal or vag bleeding. Was on meds for anemia and says her doctor stopped them recently. Feels better after the neb, O2 and ativan.  [DC]   2104 CXR shows CM without edema.  [DC]      ED Course User Index  [DC] Jorge Cardoso Jr., MD                    Clinical Impression:   Final diagnoses:  [R06.02] SOB (shortness of breath)  [J44.1] COPD with acute exacerbation (Primary)  [D64.9] Anemia, unspecified type  [K92.2] Gastrointestinal hemorrhage, unspecified gastrointestinal hemorrhage type        ED Disposition Condition    Transfer to Another Facility Stable                Jorge Cardoso Jr., MD  11/05/23 2113       Jorge Cardoso Jr., MD  11/05/23 2114       Jorge Cardoso Jr., MD  11/05/23 2114       Jorge Cardoso Jr., MD  11/05/23 2132

## 2023-11-06 NOTE — CONSULTS
GASTROENTEROLOGY INPATIENT CONSULT NOTE  Patient Name: Radha Alfred  Patient MRN: [id number]  Patient : 1955    Admit Date: 2023  Service date: 2023    Reason for Consult: anemia    PCP: Aimee Marques FNP    No chief complaint on file.      HPI: Patient is a 68 y.o. female with PMHx bipolar, breast cancer, CHF< COPD, PVD with chronic persistent severe microcytic iron deficiency anemia . Received 1 unit at [facility] and 2 unit here.  No overt bleeding    Anemia present since 3/23.    Reports persistent shortness of breath. No overt bleeding.  Reports constipation.    Denies h/o PUD.          Latest Reference Range & Units 21 16:15 22 13:09 23 12:11 23 10:50   Hemoglobin 12.0 - 16.0 g/dL 13.6 14.4 (E) 9.2 (L) (E) 8.3 (L)   Hematocrit 37.0 - 48.5 % 44.8 46.7 (E) 36.6 (L) (E) 31.4 (L)   MCV 82 - 98 fL 89 85.4 (E) 77.7 (L) (E) 68 (L)   MCH 27.0 - 31.0 pg 27.0 26.3 (L) (E) 19.5 (L) (E) 17.9 (L)   MCHC 32.0 - 36.0 g/dL 30.4 (L) 30.8 (L) (E) 25.1 (L) (E) 26.4 (L)   (L): Data is abnormally low  (E): External lab result     Latest Reference Range & Units 23 06:40 23 20:23 23 04:22 23 06:05   RBC 4.00 - 5.40 M/uL 3.99 (L) 2.98 (L) 2.97 (L)    Hemoglobin 12.0 - 16.0 g/dL 7.1 (L) 4.5 (LL) 5.1 (LL) 5.4 (LL)   Hematocrit 37.0 - 48.5 % 26.6 (L) 18.7 (LL) 19.9 (LL) 20.8 (LL)   MCV 82 - 98 fL 67 (L) 63 (L) 67 (L)    MCH 27.0 - 31.0 pg 17.8 (L) 15.1 (L) 17.2 (L)    MCHC 32.0 - 36.0 g/dL 26.7 (L) 24.1 (L) 25.6 (L)    (LL): Data is critically low  (L): Data is abnormally low      Past Medical History:  Past Medical History:   Diagnosis Date    Anxiety     Bipolar 1 disorder     Breast cancer     CHF (congestive heart failure)     COPD (chronic obstructive pulmonary disease)     Depression     Diabetes mellitus     Hypertension     PVD (peripheral vascular disease)     Schizophrenia     Thyroid disease         Past Surgical History:  Past Surgical History:   Procedure  "Laterality Date    BREAST LUMPECTOMY      TONSILLECTOMY          Home Medications:  Medications Prior to Admission   Medication Sig Dispense Refill Last Dose    albuterol sulfate 90 mcg/actuation aebs Inhale into the lungs.       carvediloL (COREG) 3.125 MG tablet Take 1 tablet (3.125 mg total) by mouth 2 (two) times daily with meals. 60 tablet 11     furosemide (LASIX) 40 MG tablet Take 1 tablet (40 mg total) by mouth 2 (two) times a day. 60 tablet 2     levothyroxine (SYNTHROID) 100 MCG tablet Take 1 tablet (100 mcg total) by mouth once daily. 30 tablet 11     lisinopriL (PRINIVIL,ZESTRIL) 5 MG Tab Take 1 tablet (5 mg total) by mouth once daily. 90 tablet 3     lovastatin (MEVACOR) 10 MG tablet Take 1 tablet (10 mg total) by mouth every evening. 30 tablet 11     risperiDONE (RISPERDAL) 2 MG tablet Take 2 mg by mouth every evening.       spironolactone (ALDACTONE) 25 MG tablet Take 1 tablet (25 mg total) by mouth once daily. 30 tablet 11        Inpatient Medications:   carvediloL  3.125 mg Oral BID WM    furosemide (LASIX) injection  20 mg Intravenous Q12H    levothyroxine  100 mcg Oral Daily    mupirocin   Nasal BID    risperiDONE  2 mg Oral QHS    senna-docusate 8.6-50 mg  1 tablet Oral BID    spironolactone  25 mg Oral Daily     0.9%  NaCl infusion (for blood administration), acetaminophen, albuterol-ipratropium, magnesium oxide, magnesium oxide, potassium bicarbonate, potassium bicarbonate, potassium bicarbonate, potassium, sodium phosphates, potassium, sodium phosphates, potassium, sodium phosphates    Review of patient's allergies indicates:   Allergen Reactions    Cortisone      Other reaction(s): "breaks me out"    Iodine Hives    Iodine and iodide containing products        Social History:   Social History     Occupational History    Not on file   Tobacco Use    Smoking status: Former     Types: Cigarettes     Passive exposure: Never    Smokeless tobacco: Never   Substance and Sexual Activity    Alcohol " "use: Not Currently    Drug use: Never    Sexual activity: Not Currently     Birth control/protection: Post-menopausal       Family History:   Family History   Problem Relation Age of Onset    Lung cancer Mother     Breast cancer Neg Hx        Review of Systems:  A 10 point review of systems was performed and was normal, except as mentioned in the HPI, including constitutional, HEENT, heme, lymph, cardiovascular, respiratory, gastrointestinal, genitourinary, neurologic, endocrine, psychiatric and musculoskeletal.      OBJECTIVE:    Physical Exam:  24 Hour Vital Sign Ranges: Temp:  [98 °F (36.7 °C)-99 °F (37.2 °C)] 98.2 °F (36.8 °C)  Pulse:  [] 86  Resp:  [14-90] 20  SpO2:  [95 %-100 %] 99 %  BP: ()/(42-63) 130/59  Most recent vitals: BP (!) 130/59   Pulse 86   Temp 98.2 °F (36.8 °C) (Oral)   Resp 20   Ht 5' 2" (1.575 m)   Wt 100 kg (220 lb 7.4 oz)   SpO2 99%   Breastfeeding No   BMI 40.32 kg/m²    GEN: obese, nad, ill appearing, dyspneic  HEENT: PERRL, sclera anicteric, oral mucosa pink and moist without lesion  NECK: trachea midline; Good ROM  CV: regular rate and rhythm, no murmurs or gallops  RESP: faint crackles  ABD: soft, non-tender, non-distended, normal bowel sounds  EXT:+ edema, 2+ pulses distally  SKIN: no rashes or jaundice  PSYCH: normal affect    Labs:   Recent Labs     11/05/23 2023 11/06/23  0422   WBC 10.09 9.57   MCV 63* 67*    379     Recent Labs     11/05/23 2023 11/06/23  0422    135*   K 3.6 3.4*   CL 99 101   CO2 25 29   BUN 28* 28*   * 104     No results for input(s): "ALB" in the last 72 hours.    Invalid input(s): "ALKP", "SGOT", "SGPT", "TBIL", "DBIL", "TPRO"  No results for input(s): "PT", "INR", "PTT" in the last 72 hours.      Radiology Review:  No orders to display         IMPRESSION / RECOMMENDATIONS:  68 year old with chronic microcytic DEE DEE w/o overt blood loss now with severe anemia post transfusions with CHF and severe " "cadiomegaly    -discussed patient's code status at length with the patient at bedside this evening.  Given severe cardiomyopathy and congestive heart failure with current volume overload I informed the patient that she may need to be intubated for the procedure.  She became very upset and stated that if I wake up and there is a tube down my throat I am suing you.  Patient stated that she would never want to be intubated or resuscitated nor would she want a pacemaker if needed.  I tried to instruct the patient that this would be a life-saving measure and hopefully only temporary if we had to intubate her for the procedure and she said "well you better not do that"  -this conversation was relayed to her nurse and Dr Medrano to address her code status     -transfuse to hgb closer to 8 if possible   -plan for EGD tomorrow if patient willing to accept risk of temporary intubation if needed for procedure.  If she is unwilling to accept risks of procedure we will not be able to perform endoscopy.  Pt is aware.  -switch protonix drip to iv push bid given CHF    Thank you for this consult.    Dom Odonnell  11/6/2023  6:57 AM        "

## 2023-11-06 NOTE — PLAN OF CARE
Problem: Adult Inpatient Plan of Care  Goal: Plan of Care Review  Outcome: Ongoing, Progressing  Goal: Patient-Specific Goal (Individualized)  Outcome: Ongoing, Progressing  Goal: Absence of Hospital-Acquired Illness or Injury  Outcome: Ongoing, Progressing  Goal: Optimal Comfort and Wellbeing  Outcome: Ongoing, Progressing  Goal: Readiness for Transition of Care  Outcome: Ongoing, Progressing     Problem: Bariatric Environmental Safety  Goal: Safety Maintained with Care  Outcome: Ongoing, Progressing     Problem: Anemia  Goal: Anemia Symptom Improvement  Outcome: Ongoing, Progressing     Problem: Adjustment to Illness (Gastrointestinal Bleeding)  Goal: Optimal Coping with Acute Illness  Outcome: Ongoing, Progressing     Problem: Bleeding (Gastrointestinal Bleeding)  Goal: Hemostasis  Outcome: Ongoing, Progressing

## 2023-11-06 NOTE — ASSESSMENT & PLAN NOTE
Body mass index is 40.32 kg/m². Morbid obesity complicates all aspects of disease management from diagnostic modalities to treatment.

## 2023-11-06 NOTE — CARE UPDATE
Prn tx available   11/06/23 0800   Patient Assessment/Suction   Level of Consciousness (AVPU) alert   Respiratory Effort Unlabored   Expansion/Accessory Muscles/Retractions no use of accessory muscles;expansion symmetric   All Lung Fields Breath Sounds clear;diminished   ANDRA Breath Sounds clear   LLL Breath Sounds diminished   RUL Breath Sounds clear   RML Breath Sounds diminished   RLL Breath Sounds diminished   Rhythm/Pattern, Respiratory no shortness of breath reported   PRE-TX-O2   Device (Oxygen Therapy) nasal cannula   $ Is the patient on Low Flow Oxygen? Yes   SpO2 98 %   Pulse Oximetry Type Continuous   $ Pulse Oximetry - Multiple Charge Pulse Oximetry - Multiple   Pulse 84   Resp (!) 27   BP (!) 129/57   Aerosol Therapy   $ Aerosol Therapy Charges PRN treatment not required   Education   $ Education DME Oxygen;15 min   Respiratory Evaluation   $ Care Plan Tech Time 15 min   $ Eval/Re-eval Charges Re-evaluation

## 2023-11-06 NOTE — SUBJECTIVE & OBJECTIVE
"Past Medical History:   Diagnosis Date    Anxiety     Bipolar 1 disorder     Breast cancer     CHF (congestive heart failure)     COPD (chronic obstructive pulmonary disease)     Depression     Diabetes mellitus     Hypertension     PVD (peripheral vascular disease)     Schizophrenia     Thyroid disease        Past Surgical History:   Procedure Laterality Date    BREAST LUMPECTOMY      TONSILLECTOMY         Review of patient's allergies indicates:   Allergen Reactions    Cortisone      Other reaction(s): "breaks me out"    Iodine Hives    Iodine and iodide containing products        Current Facility-Administered Medications on File Prior to Encounter   Medication    [COMPLETED] albuterol nebulizer solution 2.5 mg    [COMPLETED] albuterol-ipratropium 2.5 mg-0.5 mg/3 mL nebulizer solution 3 mL    [COMPLETED] albuterol-ipratropium 2.5 mg-0.5 mg/3 mL nebulizer solution 3 mL    [COMPLETED] furosemide injection 40 mg    [COMPLETED] LORazepam injection 1 mg    [DISCONTINUED] 0.9%  NaCl infusion (for blood administration)     Current Outpatient Medications on File Prior to Encounter   Medication Sig    albuterol sulfate 90 mcg/actuation aebs Inhale into the lungs.    carvediloL (COREG) 3.125 MG tablet Take 1 tablet (3.125 mg total) by mouth 2 (two) times daily with meals.    furosemide (LASIX) 40 MG tablet Take 1 tablet (40 mg total) by mouth 2 (two) times a day.    levothyroxine (SYNTHROID) 100 MCG tablet Take 1 tablet (100 mcg total) by mouth once daily.    lisinopriL (PRINIVIL,ZESTRIL) 5 MG Tab Take 1 tablet (5 mg total) by mouth once daily.    lovastatin (MEVACOR) 10 MG tablet Take 1 tablet (10 mg total) by mouth every evening.    risperiDONE (RISPERDAL) 2 MG tablet Take 2 mg by mouth every evening.    spironolactone (ALDACTONE) 25 MG tablet Take 1 tablet (25 mg total) by mouth once daily.     Family History       Problem Relation (Age of Onset)    Lung cancer Mother          Tobacco Use    Smoking status: Former     " Types: Cigarettes     Passive exposure: Never    Smokeless tobacco: Never   Substance and Sexual Activity    Alcohol use: Not Currently    Drug use: Never    Sexual activity: Not Currently     Birth control/protection: Post-menopausal     Review of Systems   Constitutional:  Positive for fatigue. Negative for activity change and appetite change.   HENT:  Negative for congestion and dental problem.    Eyes:  Negative for discharge and itching.   Respiratory:  Positive for shortness of breath.    Cardiovascular:  Negative for chest pain.   Gastrointestinal:  Negative for abdominal distention and abdominal pain.   Endocrine: Negative for cold intolerance.   Genitourinary:  Negative for difficulty urinating and dysuria.   Musculoskeletal:  Negative for arthralgias and back pain.   Skin:  Negative for color change.   Neurological:  Positive for weakness. Negative for dizziness and facial asymmetry.   Hematological:  Negative for adenopathy.   Psychiatric/Behavioral:  Negative for agitation and behavioral problems.      Objective:     Vital Signs (Most Recent):  Temp: 98.2 °F (36.8 °C) (11/06/23 0419)  Pulse: 80 (11/06/23 0430)  Resp: 20 (11/06/23 0430)  BP: (!) 106/51 (11/06/23 0430)  SpO2: 100 % (11/06/23 0430) Vital Signs (24h Range):  Temp:  [98 °F (36.7 °C)-99 °F (37.2 °C)] 98.2 °F (36.8 °C)  Pulse:  [] 80  Resp:  [14-90] 20  SpO2:  [95 %-100 %] 100 %  BP: ()/(42-63) 106/51     Weight: 100 kg (220 lb 7.4 oz)  Body mass index is 40.32 kg/m².     Physical Exam  Vitals and nursing note reviewed.   Constitutional:       General: She is not in acute distress.  HENT:      Head: Atraumatic.      Right Ear: External ear normal.      Left Ear: External ear normal.      Nose: Nose normal.      Mouth/Throat:      Mouth: Mucous membranes are dry.   Eyes:      Extraocular Movements: Extraocular movements intact.   Cardiovascular:      Rate and Rhythm: Normal rate.   Pulmonary:      Effort: Pulmonary effort is normal.    Musculoskeletal:         General: Normal range of motion.      Cervical back: Normal range of motion.      Right lower leg: Edema present.      Left lower leg: Edema present.   Skin:     General: Skin is warm.   Neurological:      Mental Status: She is alert and oriented to person, place, and time.   Psychiatric:         Behavior: Behavior normal.                Significant Labs: All pertinent labs within the past 24 hours have been reviewed.  CBC:   Recent Labs   Lab 11/05/23 2023 11/06/23  0422   WBC 10.09 9.57   HGB 4.5* 5.1*   HCT 18.7* 19.9*    379     CMP:   Recent Labs   Lab 11/05/23 2023      K 3.6   CL 99   CO2 25   *   BUN 28*   CREATININE 1.3   CALCIUM 8.8   PROT 6.4   ALBUMIN 3.5   BILITOT 0.9   ALKPHOS 69   AST 24   ALT 20   ANIONGAP 13       Significant Imaging: I have reviewed all pertinent imaging results/findings within the past 24 hours.

## 2023-11-06 NOTE — PLAN OF CARE
Problem: Adult Inpatient Plan of Care  Goal: Plan of Care Review  Outcome: Ongoing, Progressing  Goal: Patient-Specific Goal (Individualized)  Outcome: Ongoing, Progressing     Problem: Anemia  Goal: Anemia Symptom Improvement  Outcome: Ongoing, Progressing     Problem: Adjustment to Illness (Gastrointestinal Bleeding)  Goal: Optimal Coping with Acute Illness  Outcome: Ongoing, Progressing     Problem: Behavior Regulation Impairment (Manic or Hypomanic Signs/Symptoms)  Goal: Improved Impulse Control (Manic/Hypomanic Signs/Symptoms)  Outcome: Ongoing, Progressing     Problem: Infection  Goal: Absence of Infection Signs and Symptoms  Outcome: Ongoing, Progressing

## 2023-11-06 NOTE — PROVIDER TRANSFER
Outside Transfer Acceptance Note / Regional Referral Center    Referring facility: [facility]   Referring provider: GALE NAVARRETE, MARLEN EVANS JR  Accepting facility: OTHER  OTHER  Accepting provider: GUERDA AMAYA  Admitting provider:  Matt Law  Reason for transfer: Higher Level of Care  Transfer diagnosis:   Transfer specialty requested: Intensive Care  Geriatric Psychiatry  Gastroenterology  Transfer specialty notified: yes  Transfer level: NUMBER 1-5: 2  Bed type requested:  ICU  Isolation status: No active isolations   Admission class or status: Emergency  IP- Psych  IP- Inpatient      Narrative     Pt is a 68-year-old female with a past medical history of breast cancer, schizophrenia, hypothyroidism, HTN, DM2, COPD, CHF, and bipolar 1.  His last TTE was 5/17/23 and showed an EF of 45%, diastolic dysfunction, PAP 45 mmHg and mild RV enlargement who presented to the ED with complaints of shortness of breath.  Reports her breathing issue started yesterday and has been progressively worsening.  She has associated symptoms of cough, wheezing, PND, orthopnea, leg swelling.  While in the ED, patient noted to have T-max of 99°, slightly tachycardic with HR of 105, tachypneic with RR of 30, soft BP of 86/51.  Patient was placed on 2 L O2 via nasal cannula and saturating 98%.  Labs showed WBC of 10.  H/H of 4.5/18.7, MCV 63, platelet 441.  Last hemoglobin from 06/07/2023 of 7.1.  Patient has not had any bloody stools and does not know why she has a history of anemia.  In the ED, MICHAEL positive for occult blood but stools were brown.  Patient was typed and screened, will be transfused 1 unit PRBC.  CMP essentially WNL.  BNP elevated to 540.  Troponin WNL.  CXR without obvious effusions.  Patient was also given nebulizer treatments, steroids, Lasix.  Referring provider discussed case with GI who is willing to consult with Hospital  "Medicine admitting to the ICU.        Objective     Vitals: Temp: 98 °F (36.7 °C) (11/05/23 2152)  Pulse: 91 (11/05/23 2152)  Resp: 16 (11/05/23 2152)  BP: (!) 109/45 (11/05/23 2152)  SpO2: 98 % (11/05/23 2152)    Recent Labs: All pertinent labs within the past 24 hours have been reviewed.  CBC:   Recent Labs   Lab 11/05/23 2023   WBC 10.09   HGB 4.5*   HCT 18.7*        CMP:   Recent Labs   Lab 11/05/23 2023      K 3.6   CL 99   CO2 25   *   BUN 28*   CREATININE 1.3   CALCIUM 8.8   PROT 6.4   ALBUMIN 3.5   BILITOT 0.9   ALKPHOS 69   AST 24   ALT 20   ANIONGAP 13       Recent imaging:   Imaging Results              X-Ray Chest AP Portable (In process)                     IV access:        Peripheral IV - Single Lumen 11/05/23 2025 20 G;1 3/4 in Anterior;Left Upper Arm (Active)       Allergies:   Review of patient's allergies indicates:   Allergen Reactions    Cortisone      Other reaction(s): "breaks me out"    Iodine Hives    Iodine and iodide containing products         NPO: No    Anticoagulation:   Anticoagulants       None             Instructions      Upon patient arrival to the ICU, please contact Critical Care Medicine on call.    "

## 2023-11-06 NOTE — H&P
"[facility]  History & Physical    Patient Name: Radha Alfred  MRN: [id number]  Patient Class: IP- Inpatient  Admission Date: 11/6/2023  Attending Physician: Matt Law MD   Primary Care Provider: Aimee Marques FNP         Patient information was obtained from patient, past medical records and ER records.     Subjective:     Principal Problem:Acute anemia    Chief Complaint: No chief complaint on file.       HPI: 68 yr old pt getting admitted from [facility] with cute anemia  Pt has been suffering from extreme tiredness/fatigue for past 3 months  Symptoms gradually worsened for past few weeks and later she started having shortness of breath   She went to [facility] and was found to have Hb levels of 4.5  Per pt she was on FeSO4 tablets before  She denies any gay kym or Blood per rectum but said she had difficulty in BMs  Pt was given one unit pRBC in [place] and was transferred to ICU here        Past Medical History:   Diagnosis Date    Anxiety     Bipolar 1 disorder     Breast cancer     CHF (congestive heart failure)     COPD (chronic obstructive pulmonary disease)     Depression     Diabetes mellitus     Hypertension     PVD (peripheral vascular disease)     Schizophrenia     Thyroid disease        Past Surgical History:   Procedure Laterality Date    BREAST LUMPECTOMY      TONSILLECTOMY         Review of patient's allergies indicates:   Allergen Reactions    Cortisone      Other reaction(s): "breaks me out"    Iodine Hives    Iodine and iodide containing products        Current Facility-Administered Medications on File Prior to Encounter   Medication    [COMPLETED] albuterol nebulizer solution 2.5 mg    [COMPLETED] albuterol-ipratropium 2.5 mg-0.5 mg/3 mL nebulizer solution 3 mL    [COMPLETED] albuterol-ipratropium 2.5 mg-0.5 mg/3 mL nebulizer solution 3 mL    [COMPLETED] furosemide injection 40 mg    [COMPLETED] LORazepam injection 1 " mg    [DISCONTINUED] 0.9%  NaCl infusion (for blood administration)     Current Outpatient Medications on File Prior to Encounter   Medication Sig    albuterol sulfate 90 mcg/actuation aebs Inhale into the lungs.    carvediloL (COREG) 3.125 MG tablet Take 1 tablet (3.125 mg total) by mouth 2 (two) times daily with meals.    furosemide (LASIX) 40 MG tablet Take 1 tablet (40 mg total) by mouth 2 (two) times a day.    levothyroxine (SYNTHROID) 100 MCG tablet Take 1 tablet (100 mcg total) by mouth once daily.    lisinopriL (PRINIVIL,ZESTRIL) 5 MG Tab Take 1 tablet (5 mg total) by mouth once daily.    lovastatin (MEVACOR) 10 MG tablet Take 1 tablet (10 mg total) by mouth every evening.    risperiDONE (RISPERDAL) 2 MG tablet Take 2 mg by mouth every evening.    spironolactone (ALDACTONE) 25 MG tablet Take 1 tablet (25 mg total) by mouth once daily.     Family History       Problem Relation (Age of Onset)    Lung cancer Mother          Tobacco Use    Smoking status: Former     Types: Cigarettes     Passive exposure: Never    Smokeless tobacco: Never   Substance and Sexual Activity    Alcohol use: Not Currently    Drug use: Never    Sexual activity: Not Currently     Birth control/protection: Post-menopausal     Review of Systems   Constitutional:  Positive for fatigue. Negative for activity change and appetite change.   HENT:  Negative for congestion and dental problem.    Eyes:  Negative for discharge and itching.   Respiratory:  Positive for shortness of breath.    Cardiovascular:  Negative for chest pain.   Gastrointestinal:  Negative for abdominal distention and abdominal pain.   Endocrine: Negative for cold intolerance.   Genitourinary:  Negative for difficulty urinating and dysuria.   Musculoskeletal:  Negative for arthralgias and back pain.   Skin:  Negative for color change.   Neurological:  Positive for weakness. Negative for dizziness and facial asymmetry.   Hematological:  Negative for adenopathy.    Psychiatric/Behavioral:  Negative for agitation and behavioral problems.      Objective:     Vital Signs (Most Recent):  Temp: 98.2 °F (36.8 °C) (11/06/23 0419)  Pulse: 80 (11/06/23 0430)  Resp: 20 (11/06/23 0430)  BP: (!) 106/51 (11/06/23 0430)  SpO2: 100 % (11/06/23 0430) Vital Signs (24h Range):  Temp:  [98 °F (36.7 °C)-99 °F (37.2 °C)] 98.2 °F (36.8 °C)  Pulse:  [] 80  Resp:  [14-90] 20  SpO2:  [95 %-100 %] 100 %  BP: ()/(42-63) 106/51     Weight: 100 kg (220 lb 7.4 oz)  Body mass index is 40.32 kg/m².     Physical Exam  Vitals and nursing note reviewed.   Constitutional:       General: She is not in acute distress.  HENT:      Head: Atraumatic.      Right Ear: External ear normal.      Left Ear: External ear normal.      Nose: Nose normal.      Mouth/Throat:      Mouth: Mucous membranes are dry.   Eyes:      Extraocular Movements: Extraocular movements intact.   Cardiovascular:      Rate and Rhythm: Normal rate.   Pulmonary:      Effort: Pulmonary effort is normal.   Musculoskeletal:         General: Normal range of motion.      Cervical back: Normal range of motion.      Right lower leg: Edema present.      Left lower leg: Edema present.   Skin:     General: Skin is warm.   Neurological:      Mental Status: She is alert and oriented to person, place, and time.   Psychiatric:         Behavior: Behavior normal.                Significant Labs: All pertinent labs within the past 24 hours have been reviewed.  CBC:   Recent Labs   Lab 11/05/23 2023 11/06/23 0422   WBC 10.09 9.57   HGB 4.5* 5.1*   HCT 18.7* 19.9*    379     CMP:   Recent Labs   Lab 11/05/23 2023      K 3.6   CL 99   CO2 25   *   BUN 28*   CREATININE 1.3   CALCIUM 8.8   PROT 6.4   ALBUMIN 3.5   BILITOT 0.9   ALKPHOS 69   AST 24   ALT 20   ANIONGAP 13       Significant Imaging: I have reviewed all pertinent imaging results/findings within the past 24 hours.        Assessment/Plan:     * Acute anemia  Patient's  anemia is currently controlled. Has received 2 units of PRBCs on recieved one unit on 11/5 and will have another unit now. Etiology likely d/t acute blood loss which was from GI loss  Current CBC reviewed-   Lab Results   Component Value Date    HGB 5.1 (LL) 11/06/2023    HCT 19.9 (LL) 11/06/2023     Monitor serial CBC and transfuse if patient becomes hemodynamically unstable, symptomatic or H/H drops below 7/21.      Transfuse one more unit pRBC  Start iv PPI gtt  Consult GI MD  Serial Hb/Hematocrit    Chronic combined systolic and diastolic heart failure  On lasix/aldactone at home  In view with pRBC transfusions will change lasix to Iv from PO      Severe obesity (BMI >= 40)  Body mass index is 40.32 kg/m². Morbid obesity complicates all aspects of disease management from diagnostic modalities to treatment.     Hypothyroidism  On thyroxine      Schizoaffective disorder, bipolar type  Maintain Risperidone      COPD (chronic obstructive pulmonary disease)  Patient's COPD is controlled currently.  Patient is currently off COPD Pathway. Continue scheduled inhalers nebs/oxygen PRN basis and monitor respiratory status closely.       VTE Risk Mitigation (From admission, onward)         Ordered     IP VTE HIGH RISK PATIENT  Once         11/06/23 0442     Place sequential compression device  Until discontinued         11/06/23 0442                              Matt Law MD  Department of Hospital Medicine  [facility]

## 2023-11-06 NOTE — NURSING
0557 Dr Law at bedside explaining patient the need for blood transfusion, consent signed and placed in the chart.

## 2023-11-06 NOTE — PLAN OF CARE
[facility]  Initial Discharge Assessment       Primary Care Provider: Aimee Marques FNP    Admission Diagnosis: COPD exacerbation [J44.1]    Admission Date: 11/6/2023  Expected Discharge Date: 11/8/2023    Transition of Care Barriers: None    Initial assessment completed at bedside with pt. Pt lives alone but has a supportive family that will assist as needed. Pt does not want home health services and plans to discharge home with niece assisting with care.    Payor: [employer] / Plan: [employer] MEDICARE HMO / Product Type: Medicare Advantage /     Extended Emergency Contact Information  Primary Emergency Contact: Tiffanie Garcias  Mobile Phone: [phone]  Relation: Relative  Preferred language: English   needed? No  Secondary Emergency Contact: Danie Alfred  Mobile Phone: [phone]  Relation: Brother    Discharge Plan A: Home  Discharge Plan B: Home      [facility] [place] - [address]  [address] [place] [address]  [place] [address]  Phone: [phone] Fax: [phone]      Initial Assessment (most recent)       Adult Discharge Assessment - 11/06/23 1016          Discharge Assessment    Assessment Type Discharge Planning Assessment     Confirmed/corrected address, phone number and insurance Yes     Confirmed Demographics Correct on Facesheet     Source of Information patient     When was your last doctors appointment? 10/10/23     Reason For Admission Shortness of breath     People in Home alone     Facility Arrived From: [facility]     Do you expect to return to your current living situation? Yes     Do you have help at home or someone to help you manage your care at home? Yes     Who are your caregiver(s) and their phone number(s)? Rachel Robles (niece)     Walking or Climbing Stairs ambulation difficulty, requires equipment     Mobility Management rollator     Dressing/Bathing bathing difficulty, requires equipment     Dressing/Bathing Management shower chair      Equipment Currently Used at Home cane, straight;rollator;bedside commode;shower chair;hospital bed;glucometer     Readmission within 30 days? No     Patient currently being followed by outpatient case management? No     Do you currently have service(s) that help you manage your care at home? No     Do you take prescription medications? Yes     Do you have prescription coverage? Yes     Coverage wellcare     Do you have any problems affording any of your prescribed medications? No     Is the patient taking medications as prescribed? yes     Who is going to help you get home at discharge? family     How do you get to doctors appointments? health plan transportation     Are you on dialysis? No     Do you take coumadin? No     DME Needed Upon Discharge  none     Discharge Plan discussed with: Patient     Transition of Care Barriers None     Discharge Plan A Home     Discharge Plan B Home        Physical Activity    On average, how many days per week do you engage in moderate to strenuous exercise (like a brisk walk)? 2 days     On average, how many minutes do you engage in exercise at this level? 20 min        Financial Resource Strain    How hard is it for you to pay for the very basics like food, housing, medical care, and heating? Not hard at all        Housing Stability    In the last 12 months, was there a time when you were not able to pay the mortgage or rent on time? No     In the last 12 months, how many places have you lived? 1     In the last 12 months, was there a time when you did not have a steady place to sleep or slept in a shelter (including now)? No        Transportation Needs    In the past 12 months, has lack of transportation kept you from medical appointments or from getting medications? No     In the past 12 months, has lack of transportation kept you from meetings, work, or from getting things needed for daily living? No        Food Insecurity    Within the past 12 months, you worried that your  food would run out before you got the money to buy more. Never true     Within the past 12 months, the food you bought just didn't last and you didn't have money to get more. Never true        Stress    Do you feel stress - tense, restless, nervous, or anxious, or unable to sleep at night because your mind is troubled all the time - these days? Only a little        Social Connections    In a typical week, how many times do you talk on the phone with family, friends, or neighbors? More than three times a week     How often do you get together with friends or relatives? More than three times a week     How often do you attend Yazdanism or Muslim services? More than 4 times per year     Do you belong to any clubs or organizations such as Yazdanism groups, unions, fraternal or athletic groups, or school groups? No     How often do you attend meetings of the clubs or organizations you belong to? Never     Are you , , , , never , or living with a partner?         Alcohol Use    Q1: How often do you have a drink containing alcohol? Never     Q2: How many drinks containing alcohol do you have on a typical day when you are drinking? Patient does not drink     Q3: How often do you have six or more drinks on one occasion? Never

## 2023-11-06 NOTE — HPI
68 yr old pt getting admitted from [facility] with cute anemia  Pt has been suffering from extreme tiredness/fatigue for past 3 months  Symptoms gradually worsened for past few weeks and later she started having shortness of breath   She went to [facility] and was found to have Hb levels of 4.5  Per pt she was on FeSO4 tablets before  She denies any gay kym or Blood per rectum but said she had difficulty in BMs  Pt was given one unit pRBC in [place] and was transferred to ICU here

## 2023-11-06 NOTE — ASSESSMENT & PLAN NOTE
Patient's anemia is currently controlled. Has received 2 units of PRBCs on recieved one unit on 11/5 and will have another unit now. Etiology likely d/t acute blood loss which was from GI loss  Current CBC reviewed-   Lab Results   Component Value Date    HGB 5.1 (LL) 11/06/2023    HCT 19.9 (LL) 11/06/2023     Monitor serial CBC and transfuse if patient becomes hemodynamically unstable, symptomatic or H/H drops below 7/21.      Transfuse one more unit pRBC  Start iv PPI gtt  Consult GI MD  Serial Hb/Hematocrit

## 2023-11-06 NOTE — ASSESSMENT & PLAN NOTE
Patient's COPD is controlled currently.  Patient is currently off COPD Pathway. Continue scheduled inhalers nebs/oxygen PRN basis and monitor respiratory status closely.

## 2023-11-07 NOTE — PLAN OF CARE
Problem: Anemia  Goal: Anemia Symptom Improvement  Outcome: Ongoing, Progressing     Problem: Adjustment to Illness (Gastrointestinal Bleeding)  Goal: Optimal Coping with Acute Illness  Outcome: Ongoing, Progressing     Problem: Bleeding (Gastrointestinal Bleeding)  Goal: Hemostasis  Outcome: Ongoing, Progressing     Problem: Nutrition Imbalance (Manic or Hypomanic Signs/Symptoms)  Goal: Optimized Nutrition Intake (Manic or Hypomanic Signs/Symptoms)  Outcome: Ongoing, Progressing     Problem: Infection  Goal: Absence of Infection Signs and Symptoms  Outcome: Ongoing, Progressing

## 2023-11-07 NOTE — PROGRESS NOTES
Admitted to my service with anemia/Hg of 4.5.  Transfused 2 units and appropriately have risen to 6.9.  Transfusing a third unit of blood.  GI consulted. Offered endoscopy. As of right now, she is declining any procedure as she does not wish to ever be intubated and does not wish to proceed with any procedure where she could require intubation.  Her RN kindly assisted me in discussing her code status by phone and she does not want CPR or Intubation and her code status has been changed to DNR.  We will continue discussions regarding risk/benefit of endoscopy tomorrow.  On protonix gtt.  On IV lasix for diuresis. CXR with degree of pulmonary edema and she has gotten volume with blood.

## 2023-11-07 NOTE — CARE UPDATE
11/06/23 2053   Patient Assessment/Suction   Level of Consciousness (AVPU) alert   Respiratory Effort Unlabored   Expansion/Accessory Muscles/Retractions no use of accessory muscles   All Lung Fields Breath Sounds clear;diminished   Rhythm/Pattern, Respiratory no shortness of breath reported   Cough Frequency infrequent   Cough Type assisted;no productive sputum   PRE-TX-O2   Device (Oxygen Therapy) nasal cannula   $ Is the patient on Low Flow Oxygen? Yes   Flow (L/min) 3   SpO2 99 %   Pulse Oximetry Type Continuous   $ Pulse Oximetry - Multiple Charge Pulse Oximetry - Multiple   Pulse 88   Resp (!) 22   Positioning   Head of Bed (HOB) Positioning HOB elevated;HOB at 30-45 degrees   Aerosol Therapy   $ Aerosol Therapy Charges Aerosol Treatment   Daily Review of Necessity (SVN) completed   Respiratory Treatment Status (SVN) given   Treatment Route (SVN) mask   Patient Position (SVN) HOB elevated;semi-Kinney's   Post Treatment Assessment (SVN) breath sounds improved   Signs of Intolerance (SVN) none   Breath Sounds Post-Respiratory Treatment   Throughout All Fields Post-Treatment All Fields   Throughout All Fields Post-Treatment aeration increased   Post-treatment Heart Rate (beats/min) 73   Post-treatment Resp Rate (breaths/min) 95   Education   $ Education Bronchodilator;15 min   Respiratory Evaluation   $ Care Plan Tech Time 15 min   $ Eval/Re-eval Charges Re-evaluation

## 2023-11-07 NOTE — CARE UPDATE
Continue breathing tx   11/07/23 0716   Patient Assessment/Suction   Level of Consciousness (AVPU) alert   Respiratory Effort Normal;Unlabored   Expansion/Accessory Muscles/Retractions no use of accessory muscles;expansion symmetric   All Lung Fields Breath Sounds clear;crackles, fine   ANDRA Breath Sounds clear   LLL Breath Sounds crackles, fine   RUL Breath Sounds clear   RML Breath Sounds diminished   RLL Breath Sounds crackles, fine   Rhythm/Pattern, Respiratory unlabored;depth regular   Cough Frequency infrequent   Cough Type bronchospastic   PRE-TX-O2   Device (Oxygen Therapy) nasal cannula   Flow (L/min) 2   SpO2 98 %   Pulse Oximetry Type Continuous   $ Pulse Oximetry - Multiple Charge Pulse Oximetry - Multiple   Pulse 75   Resp (!) 22   Aerosol Therapy   $ Aerosol Therapy Charges Aerosol Treatment   Daily Review of Necessity (SVN) completed   Respiratory Treatment Status (SVN) given   Treatment Route (SVN) mask;oxygen   Patient Position (SVN) sitting in chair   Post Treatment Assessment (SVN) breath sounds improved;increased aeration   Signs of Intolerance (SVN) none   Breath Sounds Post-Respiratory Treatment   Throughout All Fields Post-Treatment All Fields   Throughout All Fields Post-Treatment aeration increased   Post-treatment Heart Rate (beats/min) 69   Post-treatment Resp Rate (breaths/min) 99   Education   $ Education Bronchodilator;15 min   Respiratory Evaluation   $ Care Plan Tech Time 15 min   $ Eval/Re-eval Charges Re-evaluation

## 2023-11-07 NOTE — PLAN OF CARE
Problem: Adult Inpatient Plan of Care  Goal: Plan of Care Review  Outcome: Ongoing, Progressing  Goal: Patient-Specific Goal (Individualized)  Outcome: Ongoing, Progressing  Goal: Absence of Hospital-Acquired Illness or Injury  Outcome: Ongoing, Progressing  Goal: Optimal Comfort and Wellbeing  Outcome: Ongoing, Progressing  Goal: Readiness for Transition of Care  Outcome: Ongoing, Progressing     Problem: Bariatric Environmental Safety  Goal: Safety Maintained with Care  Outcome: Ongoing, Progressing     Problem: Anemia  Goal: Anemia Symptom Improvement  Outcome: Ongoing, Progressing     Problem: Adjustment to Illness (Gastrointestinal Bleeding)  Goal: Optimal Coping with Acute Illness  Outcome: Ongoing, Progressing     Problem: Bleeding (Gastrointestinal Bleeding)  Goal: Hemostasis  Outcome: Ongoing, Progressing     Problem: Behavior Regulation Impairment (Manic or Hypomanic Signs/Symptoms)  Goal: Improved Impulse Control (Manic/Hypomanic Signs/Symptoms)  Outcome: Ongoing, Progressing     Problem: Cognitive Impairment (Manic or Hypomanic Signs/Symptoms)  Goal: Optimized Cognitive Function (Manic/Hypomanic Signs/Symptoms)  Outcome: Ongoing, Progressing     Problem: Mood Impairment (Manic or Hypomanic Signs/Symptoms)  Goal: Improved Mood Symptoms (Manic/Hypomanic Signs/Symptoms)  Outcome: Ongoing, Progressing     Problem: Nutrition Imbalance (Manic or Hypomanic Signs/Symptoms)  Goal: Optimized Nutrition Intake (Manic or Hypomanic Signs/Symptoms)  Outcome: Ongoing, Progressing     Problem: Psychomotor Impairment (Manic or Hypomanic Signs/Symptoms)  Goal: Improved Psychomotor Symptoms (Manic/Hypomanic Signs/Symptoms)  Outcome: Ongoing, Progressing     Problem: Sleep Disturbance (Manic or Hypomanic Signs/Symptoms)  Goal: Improved Sleep (Manic/Hypomanic Signs/Symptoms)  Outcome: Ongoing, Progressing     Problem: Social, Occupational or Functional Impairment (Manic/Hypomanic Signs/Symptoms)  Goal: Enhanced Social,  Occupational or Functional Skills (Manic/Hypomanic Signs/Symptoms)  Outcome: Ongoing, Progressing     Problem: Infection  Goal: Absence of Infection Signs and Symptoms  Outcome: Ongoing, Progressing

## 2023-11-07 NOTE — PROGRESS NOTES
[facility] Medicine  Progress Note    Patient name: Radha Alfred  MRN: [id number]  Admit Date: 11/6/2023   LOS: 1 day     SUBJECTIVE:     Principal problem: Acute anemia    Interval History:  H/H improved with transfusion. She continues to decline the offer of EGD/Colonoscopy. GI is aware.  Vitals and exam stable.  Is net negative 4.5L with IV lasix.  Lung exam improved compared to yesterday. She requests a nebulizer with duonebs and a wheelchair on discharge.     Hospital Course:    Admitted to my service with anemia/Hg of 4.5.  Transfused 2 units and appropriately have risen to 6.9.  Transfusing a third unit of blood.  GI consulted. Offered endoscopy. As of right now, she is declining any procedure as she does not wish to ever be intubated and does not wish to proceed with any procedure where she could require intubation.  Her RN kindly assisted me in discussing her code status by phone and she does not want CPR or Intubation and her code status has been changed to DNR.  We will continue discussions regarding risk/benefit of endoscopy tomorrow.  On protonix gtt.  On IV lasix for diuresis. CXR with degree of pulmonary edema and she has gotten volume with blood.            Scheduled Meds:   benzonatate  200 mg Oral TID    carvediloL  3.125 mg Oral BID WM    furosemide (LASIX) injection  20 mg Intravenous Q12H    levothyroxine  100 mcg Oral Daily    mupirocin   Nasal BID    pantoprazole  40 mg Intravenous BID    risperiDONE  2 mg Oral QHS    senna-docusate 8.6-50 mg  1 tablet Oral BID    spironolactone  25 mg Oral Daily     Continuous Infusions:  PRN Meds:0.9%  NaCl infusion (for blood administration), 0.9%  NaCl infusion (for blood administration), acetaminophen, albuterol sulfate, magnesium oxide, magnesium oxide, ondansetron, potassium bicarbonate, potassium bicarbonate, potassium bicarbonate, potassium, sodium phosphates, potassium, sodium phosphates, potassium, sodium phosphates    Review  "of patient's allergies indicates:   Allergen Reactions    Cortisone      Other reaction(s): "breaks me out"    Iodine Hives    Iodine and iodide containing products        Review of Systems: As per interval history    OBJECTIVE:     Vital Signs (Most Recent)  Temp: 98 °F (36.7 °C) (11/07/23 1600)  Pulse: 89 (11/07/23 1700)  Resp: (!) 22 (11/07/23 1700)  BP: (!) 114/53 (11/07/23 1700)  SpO2: 100 % (11/07/23 1700)    Vital Signs Range (Last 24H):  Temp:  [97.9 °F (36.6 °C)-98.3 °F (36.8 °C)]   Pulse:  [66-96]   Resp:  [15-37]   BP: (102-145)/(49-87)   SpO2:  [92 %-100 %]     I & O (Last 24H):  Intake/Output Summary (Last 24 hours) at 11/7/2023 1752  Last data filed at 11/7/2023 1637  Gross per 24 hour   Intake 120 ml   Output 4050 ml   Net -3930 ml       Physical Exam:    Vitals and nursing note reviewed.     Constitutional:       General: Not in acute distress.     Appearance: Well-developed.   HENT:      Head: Normocephalic and atraumatic.   Eyes:      Pupils: Pupils are equal, round, and reactive to light.   Cardiovascular:      Rate and Rhythm: Regular rhythm.   Mild LE edema   Pulmonary:      Effort: Pulmonary effort is normal.      Breath sounds: Normal breath sounds. No wheezing.   O2 per NC  Cough intermittently  Good airmovement  Abdominal:      General: There is no distension.      Palpations: Abdomen is soft.      Tenderness: There is no abdominal tenderness. There is no guarding or rebound.   Musculoskeletal:         General: Normal range of motion.      Cervical back: Normal range of motion.   Skin:     Findings: No rash.   Neurological:      Mental Status: Alert and oriented to person, place, and time.      Cranial Nerves: No cranial nerve deficit.      Sensory: No sensory deficit.     Laboratory:  I have reviewed all pertinent lab results within the past 24 hours.  CBC:   Recent Labs   Lab 11/07/23  0343   WBC 11.17   RBC 4.36   HGB 8.9*   HCT 31.0*      MCV 71*   MCH 20.4*   MCHC 28.7*     CMP: "   Recent Labs   Lab 11/07/23  0343   GLU 92   CALCIUM 9.2   ALBUMIN 4.0   PROT 6.6   *   K 3.9   CO2 30*   CL 98   BUN 18   CREATININE 0.8   ALKPHOS 69   ALT 13   AST 19   BILITOT 2.6*       Diagnostic Results:      ASSESSMENT/PLAN:     Active Hospital Problems    Diagnosis  POA    *Acute anemia [D64.9]  Unknown    Chronic combined systolic and diastolic heart failure [I50.42]  Unknown    Severe obesity (BMI >= 40) [E66.01]  Yes    COPD (chronic obstructive pulmonary disease) [J44.9]  Yes    Schizoaffective disorder, bipolar type [F25.0]  Yes    Hypothyroidism [E03.9]  Yes      Resolved Hospital Problems   No resolved problems to display.         Plan:     -Admitted with anemia/Hg 4.5. Has received 3 units of blood.  Hg now above 8.  Declines endoscopy with GI. Stool was normal brown color today.  -Chart reviewed and 6/23 does reveal evidence of iron deficiency anemia.  I will prescribe iron on discharge  -Continuing IV lasix for diuresis.    -Supplemental oxygen per home regimen  -Will move out of ICU.  IF H/H remains   stable, will plan to discharge in AM. Discussed follow up with PCP for larger workup of her anemia and repeat labs to trend.        VTE Risk Mitigation (From admission, onward)           Ordered     IP VTE HIGH RISK PATIENT  Once         11/06/23 0442     Place sequential compression device  Until discontinued         11/06/23 0442                      Department Hospital Medicine  [facility]  Regulo Medrano MD  Date of service: 11/07/2023

## 2023-11-07 NOTE — CONSULTS
Consult for Education. Diet just advanced.   Education not appropriate at this time; Follow for assessment day 2 ICU.

## 2023-11-07 NOTE — NURSING
Nurses Note -- 4 Eyes      11/6/2023   6:57 PM      Skin assessed during: Admit      [x] No Altered Skin Integrity Present    [x]Prevention Measures Documented      [] Yes- Altered Skin Integrity Present or Discovered   [] LDA Added if Not in Epic (Describe Wound)   [] New Altered Skin Integrity was Present on Admit and Documented in LDA   [] Wound Image Taken    Wound Care Consulted? No    Attending Nurse:  Ayla Mcgovern RN/Staff Member:  Melany

## 2023-11-07 NOTE — NURSING
Patient refused her scheduled resperidone, insists that she needs abilify but it's not listed on her home meds and she's not sure about the dose, Dr Gutierrez notified.

## 2023-11-08 NOTE — HOSPITAL COURSE
Admitted to my service with anemia/Hg of 4.5.  Transfused 2 units and appropriately have risen to 6.9.  Transfusing a third unit of blood.  GI consulted. Offered endoscopy. As of right now, she is declining any procedure as she does not wish to ever be intubated and does not wish to proceed with any procedure where she could require intubation.  Her RN kindly assisted me in discussing her code status by phone and she does not want CPR or Intubation and her code status has been changed to DNR.  We will continue discussions regarding risk/benefit of endoscopy tomorrow.  On protonix gtt.  On IV lasix for diuresis. CXR with degree of pulmonary edema and she has gotten volume with blood.          H/H improved with transfusion. She continues to decline the offer of EGD/Colonoscopy. GI is aware.  Vitals and exam stable.  Is net negative 4.5L with IV lasix.  Lung exam improved. She requests a nebulizer with duonebs and a wheelchair on discharge- ordered. Discussed follow up with PCP for further anemia workup. Review of records does reveal significant DEE DEE in June. She reports she was on iron for two weeks and then her MD took her off. She was not sure why.  I have re ordered iron supplement.  I did not repeat labs as she is s/p 3 units of blood and I do not believe it will be accurate. I discussed follow up with her PCP for repeat blood work and if anemia continues to worsen, I strongly recommended GI follow up to re consider EGD and colonoscopy. Her stool was brown here but I do worry that she has a slow intermittent bleed in setting of DEE DEE.  I also discussed referral to Hematology if anemia worsens.  Examined on day of discharge and alert, NAD,comfortable respirations.  Return precautions given.

## 2023-11-08 NOTE — CARE UPDATE
11/08/23 0742   Patient Assessment/Suction   Level of Consciousness (AVPU) alert   Respiratory Effort Unlabored   All Lung Fields Breath Sounds clear;crackles   PRE-TX-O2   Device (Oxygen Therapy) room air   SpO2 96 %   Pulse Oximetry Type Continuous   $ Pulse Oximetry - Multiple Charge Pulse Oximetry - Multiple   Pulse 85   Resp 15   Aerosol Therapy   $ Aerosol Therapy Charges Aerosol Treatment   Daily Review of Necessity (SVN) completed   Respiratory Treatment Status (SVN) given   Treatment Route (SVN) mask   Patient Position (SVN) sitting in chair   Post Treatment Assessment (SVN) increased aeration   Signs of Intolerance (SVN) none   Breath Sounds Post-Respiratory Treatment   Throughout All Fields Post-Treatment aeration increased   Post-treatment Heart Rate (beats/min) 80   Post-treatment Resp Rate (breaths/min) 15   Education   $ Education Bronchodilator;15 min   Respiratory Evaluation   $ Care Plan Tech Time 15 min

## 2023-11-08 NOTE — CARE UPDATE
11/07/23 2042   Patient Assessment/Suction   Level of Consciousness (AVPU) alert   Respiratory Effort Normal;Mouth breathing   Expansion/Accessory Muscles/Retractions no use of accessory muscles   All Lung Fields Breath Sounds diminished;clear;Posterior:   Rhythm/Pattern, Respiratory no shortness of breath reported   Cough Frequency frequent   Cough Type nonproductive   PRE-TX-O2   Device (Oxygen Therapy) nasal cannula   $ Is the patient on Low Flow Oxygen? Yes   SpO2 97 %   Pulse Oximetry Type Continuous   $ Pulse Oximetry - Single Charge Pulse Oximetry - Single   $ Pulse Oximetry - Multiple Charge Pulse Oximetry - Multiple   Oximetry Probe Site Applied   Pulse 91   Resp (!) 28   BP (!) 118/54   Positioning Sitting on edge of bed (dangling)   Positioning   Body Position position changed independently   Head of Bed (HOB) Positioning   (sitting on the side of the bed)   Positioning/Transfer Devices pillows;in use   Aerosol Therapy   $ Aerosol Therapy Charges Aerosol Treatment   Daily Review of Necessity (SVN) completed   Respiratory Treatment Status (SVN) given   Treatment Route (SVN) mask;oxygen   Patient Position (SVN) sitting on edge of bed   Post Treatment Assessment (SVN) increased aeration   Signs of Intolerance (SVN) none   Education   $ Education Bronchodilator;15 min   Respiratory Evaluation   $ Care Plan Tech Time 15 min   $ Eval/Re-eval Charges Re-evaluation

## 2023-11-08 NOTE — PLAN OF CARE
CM sent referral to [facility] for nebulizer and wheelchair. [facility] is not in network with [facility].     CM called Khadra with [employer], they are in network with [facility]. CM faxed Nebulizer and wheelchair order to [facility].    1128- CM spoke with Lucero from [employer]. [facility] requires a prior auth on all DME which could take 3 days. GILLES notified Dr Medrano.       11/08/23 1036   Discharge Reassessment   Assessment Type Discharge Planning Reassessment   Discharge Plan discussed with: Patient   Communicated BEE with patient/caregiver Yes   Discharge Plan A Home   Discharge Plan B Home   DME Needed Upon Discharge  nebulizer;wheelchair   Post-Acute Status   Post-Acute Authorization HME   HME Status Referrals Sent

## 2023-11-08 NOTE — CONSULTS
Per consult order:  Patient with dc order; RD provided written and verbal heart healthy diet education. Patient voiced understanding. Pt and RD discussed low sodium seasonings and limiting high fat proteins; foods high in iron; and encouraged fish, poultry and meatless meals. Contact information provided should pt have any further questions.

## 2023-11-08 NOTE — PHARMACY MED REC
"              .        Admission Medication History     The home medication history was taken by Rylee Marie.    You may go to "Admission" then "Reconcile Home Medications" tabs to review and/or act upon these items.     The home medication list has been updated by the Pharmacy department.   Please read ALL comments highlighted in yellow.   Please address this information as you see fit.    Feel free to contact us if you have any questions or require assistance.      The medications listed below were removed from the home medication list. Please reorder if appropriate:  Patient reports no longer taking the following medication(s):  Lovastatin  Montelukast  Prednisone    Medications listed below were obtained from: Patient/family and Analytic software- [employer]  No current facility-administered medications on file prior to encounter.     Current Outpatient Medications on File Prior to Encounter   Medication Sig Dispense Refill    albuterol sulfate 90 mcg/actuation aebs Inhale 1 puff into the lungs every 4 to 6 hours as needed.      aspirin (ECOTRIN) 81 MG EC tablet Take 81 mg by mouth once daily.      cyanocobalamin (VITAMIN B-12) 1000 MCG tablet Take 1,000 mcg by mouth once daily.      ARIPiprazole (ABILIFY) 5 MG Tab Take 5 mg by mouth every evening.      ascorbic acid, vitamin C, (VITAMIN C) 1000 MG tablet Take 1,000 mg by mouth once daily.      carvediloL (COREG) 3.125 MG tablet Take 1 tablet (3.125 mg total) by mouth 2 (two) times daily with meals. (Patient not taking: Reported on 11/8/2023) 60 tablet 11    ergocalciferol (ERGOCALCIFEROL) 50,000 unit Cap Take 50,000 Int'l Units by mouth every 7 days. MONDAYS      furosemide (LASIX) 40 MG tablet Take 1 tablet (40 mg total) by mouth 2 (two) times a day. 60 tablet 2    levothyroxine (SYNTHROID) 100 MCG tablet Take 1 tablet (100 mcg total) by mouth once daily. 30 tablet 11    lisinopriL (PRINIVIL,ZESTRIL) 5 MG Tab Take 1 tablet (5 mg total) by mouth once daily. " (Patient taking differently: Take 2.5 mg by mouth once daily.) 90 tablet 3    risperiDONE (RISPERDAL) 2 MG tablet Take 2 mg by mouth every evening.      rosuvastatin (CRESTOR) 20 MG tablet Take 20 mg by mouth every evening.      spironolactone (ALDACTONE) 25 MG tablet Take 1 tablet (25 mg total) by mouth once daily. 30 tablet 11    [DISCONTINUED] lovastatin (MEVACOR) 10 MG tablet Take 1 tablet (10 mg total) by mouth every evening. 30 tablet 11    [DISCONTINUED] montelukast (SINGULAIR) 10 mg tablet Take 10 mg by mouth once daily.      [DISCONTINUED] predniSONE (DELTASONE) 10 MG tablet Take 20 mg by mouth 2 (two) times daily.         Potential issues to be addressed PRIOR TO DISCHARGE  Patient reported not taking the following medications: (Risperidone & Carvedilol). These medications remain on the home medication list. Please address accordingly.     Rylee Marie  EXT 1924

## 2023-11-08 NOTE — DISCHARGE SUMMARY
[facility]  Discharge Summary      Patient Name: Radha Alfred  MRN: [id number]  KARENA: [id number]  Patient Class: IP- Inpatient  Admission Date: 11/6/2023  Hospital Length of Stay: 2 days  Discharge Date and Time: No discharge date for patient encounter.  Attending Physician: Regulo Medrano MD   Discharging Provider: Regulo Medrano MD  Primary Care Provider: Aimee Marques FNP    Primary Care Team: Networked reference to record PCT     HPI:   68 yr old pt getting admitted from [facility] with cute anemia  Pt has been suffering from extreme tiredness/fatigue for past 3 months  Symptoms gradually worsened for past few weeks and later she started having shortness of breath   She went to [facility] and was found to have Hb levels of 4.5  Per pt she was on FeSO4 tablets before  She denies any gay kym or Blood per rectum but said she had difficulty in BMs  Pt was given one unit pRBC in [place] and was transferred to ICU here        * No surgery found *      Hospital Course:   Admitted to my service with anemia/Hg of 4.5.  Transfused 2 units and appropriately have risen to 6.9.  Transfusing a third unit of blood.  GI consulted. Offered endoscopy. As of right now, she is declining any procedure as she does not wish to ever be intubated and does not wish to proceed with any procedure where she could require intubation.  Her RN kindly assisted me in discussing her code status by phone and she does not want CPR or Intubation and her code status has been changed to DNR.  We will continue discussions regarding risk/benefit of endoscopy tomorrow.  On protonix gtt.  On IV lasix for diuresis. CXR with degree of pulmonary edema and she has gotten volume with blood.          H/H improved with transfusion. She continues to decline the offer of EGD/Colonoscopy. GI is aware.  Vitals and exam stable.  Is net negative 4.5L with IV lasix.  Lung exam improved. She requests a nebulizer  with duonebs and a wheelchair on discharge- ordered. Discussed follow up with PCP for further anemia workup. Review of records does reveal significant DEE DEE in June. She reports she was on iron for two weeks and then her MD took her off. She was not sure why.  I have re ordered iron supplement.  I did not repeat labs as she is s/p 3 units of blood and I do not believe it will be accurate. I discussed follow up with her PCP for repeat blood work and if anemia continues to worsen, I strongly recommended GI follow up to re consider EGD and colonoscopy. Her stool was brown here but I do worry that she has a slow intermittent bleed in setting of DEE DEE.  I also discussed referral to Hematology if anemia worsens.  Examined on day of discharge and alert, NAD,comfortable respirations.  Return precautions given.       Goals of Care Treatment Preferences:  Code Status: DNR      Consults:   Consults (From admission, onward)        Status Ordering Provider     Inpatient consult to Registered Dietitian/Nutritionist  Once        Provider:  (Not yet assigned)    Completed KEVIN TAYLOR     Inpatient consult to Gastroenterology  Once        Provider:  Dom Odonnell MD    Completed COLEMAN LAGUNAS new Assessment & Plan notes have been filed under this hospital service since the last note was generated.  Service: Hospital Medicine    Final Active Diagnoses:    Diagnosis Date Noted POA    PRINCIPAL PROBLEM:  Acute anemia [D64.9] 11/06/2023 Yes    Chronic combined systolic and diastolic heart failure [I50.42] 11/06/2023 Yes    Severe obesity (BMI >= 40) [E66.01] 06/01/2023 Yes    COPD (chronic obstructive pulmonary disease) [J44.9] 05/17/2023 Yes    Schizoaffective disorder, bipolar type [F25.0] 05/17/2023 Yes    Hypothyroidism [E03.9] 05/17/2023 Yes      Problems Resolved During this Admission:       Discharged Condition: good    Disposition: Home or Self Care    Follow Up:   Follow-up Information     Aimee Marques  "FRANSISCA LAUREN. Schedule an appointment as soon as possible for a visit in 2 week(s).    Specialty: Family Medicine  Why: post hospital discharge follow up for anemia.  Transfused 3 units of blood. Will need further outpatient workup and repeat labs.  If continues to worsen, I would recommend GI and Hematology referral.  Iron supplement restarted.  Contact information:  [address] [place] [address]  [phone]                       Patient Instructions:      NEBULIZER FOR HOME USE     Order Specific Question Answer Comments   Height: 5' 2" (1.575 m)    Weight: 98.6 kg (217 lb 6 oz)    Does patient have medical equipment at home? cane, straight    Does patient have medical equipment at home? rollator    Does patient have medical equipment at home? bedside commode    Does patient have medical equipment at home? shower chair    Does patient have medical equipment at home? hospital bed    Does patient have medical equipment at home? glucometer    Length of need (1-99 months): 99      WHEELCHAIR FOR HOME USE     Order Specific Question Answer Comments   Hours in W/C per day: 8    Type of Wheelchair: Standard    Size(Width): 18"(STD adult)    Leg Support: STD footrests    Lap Belt: Velcro    Accessories: Anti-tippers    Cushion: Basic    Reclining Back No    Height: 5' 2" (1.575 m)    Weight: 98.6 kg (217 lb 6 oz)    Does patient have medical equipment at home? cane, straight    Does patient have medical equipment at home? rollator    Does patient have medical equipment at home? bedside commode    Does patient have medical equipment at home? shower chair    Does patient have medical equipment at home? hospital bed    Does patient have medical equipment at home? glucometer    Length of need (1-99 months): 99    Please check all that apply: Caregiver is capable and willing to operate wheelchair safely.    Please check all that apply: Patient's upper body strength is sufficient for propulsion.    Please check all that " apply: The patient requires the use of a w/c for activities of daily living within the Home.    Please check all that apply: Patient mobility limitations cannot be sufficiently resolved by the use of other ambulatory therapies.      Ambulatory referral/consult to Outpatient Case Management   Referral Priority: Routine Referral Type: Consultation   Referral Reason: Specialty Services Required   Number of Visits Requested: 1     Diet Cardiac     Notify your health care provider if you experience any of the following:  difficulty breathing or increased cough     Notify your health care provider if you experience any of the following:  increased confusion or weakness     Activity as tolerated       Significant Diagnostic Studies: Labs:   CMP   Recent Labs   Lab 11/07/23 0343 11/08/23 0337   * 136   K 3.9 3.7   CL 98 96   CO2 30* 32*   GLU 92 109   BUN 18 21   CREATININE 0.8 0.9   CALCIUM 9.2 9.1   PROT 6.6 6.6   ALBUMIN 4.0 3.9   BILITOT 2.6* 1.5*   ALKPHOS 69 66   AST 19 17   ALT 13 11   ANIONGAP 7* 8    and CBC   Recent Labs   Lab 11/06/23  1817 11/07/23 0343 11/08/23 0337   WBC  --  11.17 8.00   HGB 8.1* 8.9* 9.2*   HCT 27.7* 31.0* 33.2*   PLT  --  383 360       Pending Diagnostic Studies:     None         Medications:  Reconciled Home Medications:      Medication List      START taking these medications    albuterol-ipratropium 2.5 mg-0.5 mg/3 mL nebulizer solution  Commonly known as: DUO-NEB  Take 3 mLs by nebulization every 6 (six) hours as needed for Wheezing. Rescue     ferrous sulfate 325 (65 FE) MG EC tablet  Take 1 tablet (325 mg total) by mouth once daily.        CONTINUE taking these medications    albuterol sulfate 90 mcg/actuation Aebs  Inhale 1 puff into the lungs every 4 to 6 hours as needed.     ARIPiprazole 5 MG Tab  Commonly known as: ABILIFY  Take 5 mg by mouth every evening.     aspirin 81 MG EC tablet  Commonly known as: ECOTRIN  Take 81 mg by mouth once daily.     cyanocobalamin 1000  MCG tablet  Commonly known as: VITAMIN B-12  Take 1,000 mcg by mouth once daily.     ergocalciferol 50,000 unit Cap  Commonly known as: ERGOCALCIFEROL  Take 50,000 Int'l Units by mouth every 7 days. MONDAYS     furosemide 40 MG tablet  Commonly known as: LASIX  Take 1 tablet (40 mg total) by mouth 2 (two) times a day.     INV lisinopril 5 MG Tab  Commonly known as: PRINIVIL,ZESTRIL  Take 1 tablet (5 mg total) by mouth once daily.     levothyroxine 100 MCG tablet  Commonly known as: SYNTHROID  Take 1 tablet (100 mcg total) by mouth once daily.     rosuvastatin 20 MG tablet  Commonly known as: CRESTOR  Take 20 mg by mouth every evening.     spironolactone 25 MG tablet  Commonly known as: ALDACTONE  Take 1 tablet (25 mg total) by mouth once daily.     VITAMIN C 1000 MG tablet  Generic drug: ascorbic acid (vitamin C)  Take 1,000 mg by mouth once daily.        STOP taking these medications    carvediloL 3.125 MG tablet  Commonly known as: COREG     risperiDONE 2 MG tablet  Commonly known as: RISPERDAL            Indwelling Lines/Drains at time of discharge:   Lines/Drains/Airways     None                 Time spent on the discharge of patient: 33 minutes  .     Regulo Medrano MD  Department of Hospital Medicine  [facility]

## 2023-11-08 NOTE — NURSING
Pt educated on discharge instructions. Pt verbalized understanding. Awaiting arrival of patient's family for transportation home.

## 2023-11-08 NOTE — PLAN OF CARE
Patient cleared for discharge from case management standpoint.    Follow up appointments scheduled and added to AVS.    [facility] is processing wheelchair and nebulizer orders. Once approved will be delivered. Per Lucero at [facility] this could take 3 days on average. CM notified Dr Medrano. MD is Ok with discharge and DME delivery after insurance approval. CM updated pt.    Chart and discharge orders reviewed.  Patient discharged home with no further case management needs.       11/08/23 1140   Final Note   Assessment Type Final Discharge Note   Anticipated Discharge Disposition Home   Hospital Resources/Appts/Education Provided Provided patient/caregiver with written discharge plan information;Appointments scheduled and added to AVS   Post-Acute Status   Post-Acute Authorization HME   HME Status Pending payor review   Discharge Delays (!) Personal Transportation

## 2023-11-10 NOTE — PROGRESS NOTES
"[facility]  Progress Note    Patient Name: Radha Alfred  MRN: [id number]  Patient Class: OP- Observation   Admission Date: 6/1/2023  Length of Stay: 0 days  Attending Physician: Yariel Keane MD  Primary Care Provider: FRANSISCA Rodriguez        Subjective:     Principal Problem:Acute on chronic combined systolic and diastolic heart failure        HPI:  Ms. Alfred is a 66yo lady with a past medical history of breast cancer, schizophrenia, hypothyroidism, HTN, DM2, COPD, CHF, and bipolar 1.  His last TTE was 5/17/23 and showed an EF of 45%, diastolic dysfunction, PAP 45 mmHg and mild RV enlargement.    She was recently admitted to [facility] on 5/17/23 to 5/24/23 with CHF exacerbation with hypoxic respiratory failure.  She refused SNF at discharge and took none of her prescribed medications after she went home.  She was not requiring any supplemental O2 at discharge.    Ms. Alfred now comes to the ED after her daughter found her confused with bizarre behavior at home.  When asked why she came to the hospital, she tells me, "because I had to pee."  She has no recollection of the day's events and remains a little withdrawn and confused.  When she got to the ED, she was tangential, agitated and very confused, requiring multiple dose of psychotropic meds (see below) to get her to cooperate with labs and radiographs.  She was PEC'd.  She is able to tell me she has no CP or SOB, but then just states, "I want to go home."      VS in the ED were /63 -> 125/63 (BP Location: Right arm, Patient Position: Lying)   Pulse 78   Temp 98.6 °F (37 °C) (Oral)   Resp 18   Ht 5' 2" (1.575 m)   Wt 99.8 kg (220 lb)   SpO2 90 -> 88 -> 81% RA -> 97% 2L NC  Breastfeeding No   BMI 40.24 kg/m².  Labs showed Hg 8.1, MCV 66, K 2.8, Cr 0.8, TB 1.3, , Trop 0.044, UA: nitrite negative, LE 3+, WBC 25, many bacteria.    ABG RA: pH 7.46, PCO2 49, PAO2 44, sat 81%.    CXR showed the heart is " enlarged.  Primary pulmonary artery segment enlarged.  Prominent lung markings suggesting pulmonary vascular distention and mild perihilar infiltrate could also be present in the right infrahilar region.  Distribution is similar to the prior exam but overall appearing slightly less prominent today.  The cardiomediastinal silhouette appears stable.  Impression:  Chest appearing slightly improved compared to the prior exam suggesting improving CHF.    In the ED she was treated with:  Medications   LORazepam injection 2 mg (2 mg Intramuscular Given 6/1/23 1747)   diphenhydrAMINE injection 50 mg (50 mg Intramuscular Given 6/1/23 1747)   haloperidol lactate injection 10 mg (10 mg Intramuscular Given 6/1/23 1747)   furosemide injection 80 mg (80 mg Intravenous Given 6/1/23 1927)               Overview/Hospital Course:  No notes on file    Interval History: uncertain baseline mental status though today patient has tangential and disordered thought content    Review of Systems  Objective:     Vital Signs (Most Recent):  Temp: 97.2 °F (36.2 °C) (06/03/23 1123)  Pulse: 83 (06/03/23 1123)  Resp: 14 (06/03/23 1123)  BP: (!) 156/68 (06/03/23 1123)  SpO2: 96 % (06/03/23 1123) Vital Signs (24h Range):  Temp:  [97.2 °F (36.2 °C)-98 °F (36.7 °C)] 97.2 °F (36.2 °C)  Pulse:  [66-89] 83  Resp:  [12-20] 14  SpO2:  [84 %-99 %] 96 %  BP: ()/(46-68) 156/68     Weight: 103.2 kg (227 lb 7.5 oz)  Body mass index is 41.6 kg/m².    Intake/Output Summary (Last 24 hours) at 6/3/2023 1236  Last data filed at 6/3/2023 0821  Gross per 24 hour   Intake 720 ml   Output --   Net 720 ml         Physical Exam  Vitals reviewed.   Cardiovascular:      Rate and Rhythm: Normal rate and regular rhythm.   Pulmonary:      Effort: Pulmonary effort is normal.   Abdominal:      General: There is no distension.   Neurological:      General: No focal deficit present.      Mental Status: She is alert.   Psychiatric:         Attention and Perception: She is  inattentive.         Mood and Affect: Affect is labile.         Speech: She is noncommunicative. Speech is tangential.         Cognition and Memory: Memory is impaired.         Judgment: Judgment is inappropriate.           Significant Labs: All pertinent labs within the past 24 hours have been reviewed.  CBC:   Recent Labs   Lab 06/01/23 1820 06/03/23  0632   WBC 8.54 7.86   HGB 8.1* 6.8*   HCT 30.9* 26.4*   * 421     CMP:   Recent Labs   Lab 06/01/23 1820 06/02/23  0632 06/03/23  0650    141 137   K 2.8* 3.5 3.5   CL 94* 98 93*   CO2 32* 33* 31*   GLU 98 85 89   BUN 16 16 26*   CREATININE 0.8 0.7 1.1   CALCIUM 10.1 9.2 9.2   PROT 7.7  --   --    ALBUMIN 4.2  --   --    BILITOT 1.3*  --   --    ALKPHOS 71  --   --    AST 17  --   --    ALT 7*  --   --    ANIONGAP 13 10 13       Significant Imaging: I have reviewed all pertinent imaging results/findings within the past 24 hours.        Assessment/Plan:      * Acute on chronic combined systolic and diastolic heart failure  Patient is identified as having Combined Systolic and Diastolic heart failure that is Acute on chronic. CHF is currently uncontrolled due to Continued edema of extremities, Dyspnea not returned to baseline after 1 doses of IV diuretic and Pulmonary edema/pleural effusion on CXR. Latest ECHO performed and demonstrates- Results for orders placed during the hospital encounter of 05/17/23    Echo    Interpretation Summary  · The left ventricle is mildly enlarged with concentric hypertrophy and mildly decreased systolic function.  · The estimated ejection fraction is 45%.  · Left ventricular diastolic dysfunction.  · Mild right ventricular enlargement.  · Normal central venous pressure (3 mmHg).  · The estimated PA systolic pressure is 45 mmHg.  · Small pericardial effusion.  · There is moderate left ventricular global hypokinesis.  · Moderate left atrial enlargement.       Continue Beta Blocker, ACE/ARB, Furosemide and Aldactone and  monitor clinical status closely. Monitor on telemetry. Patient is on CHF pathway.  Monitor strict Is&Os and daily weights.  Place on fluid restriction of 1 L. Continue to stress to patient importance of self efficacy and  on diet for CHF. Last BNP reviewed- and noted below   Recent Labs   Lab 06/01/23  1820   *            [START ON 6/2/2023] carvediloL tablet 3.125 mg, 3.125 mg, Oral, BID WM     [START ON 6/2/2023] furosemide injection 60 mg, 60 mg, Intravenous, Q12H     lisinopriL tablet 10 mg, 10 mg, Oral, Daily     [START ON 6/2/2023] spironolactone tablet 25 mg, 25 mg, Oral, Daily       Patient refusing IV lasix. Also refusing potassium at times. Attempting to regulate psychiatric medications in order to hopefully improve adherence to medication regimen overall. May need GeriPsych        Acute respiratory failure with hypoxia  Continues to require supplemental oxygen though not medically optimized at this time due to difficulties getting patient to comply with medication regimen       Severe obesity (BMI >= 40)  Encourage exercise, weight loss and healthy diet      Acute cystitis without hematuria  Continue rocephin for now, f/u cxs - gram negative rods     Latest Reference Range & Units 06/01/23 16:41   NITRITE UA Negative  Negative   UROBILINOGEN UA Negative EU/dL 1.0   Bilirubin (UA) Negative  Negative   Leukocytes, UA Negative  3+ !   RBC, UA 0 - 4 /hpf 2   WBC, UA 0 - 5 /hpf 25 (H)   Bacteria, UA None-Occ /hpf Many !          Hypokalemia  KCl 10 meq iv x 1  KCl 40 meq po x 1  Follow daily  Mg 2.1  Continue replacement PO      Microcytic anemia  Follow up with GI for scopes  Check B12, folate, Fe studies  Start PO Fe for iron deficiency - patient refusing  Hgb 6.8 today, repeat and transfuse if < 7.0         Elevated troponin level not due to acute coronary syndrome  This is more representative of type II strain ischemia from CHF  Will trend out to be sure  EKG with no acute ischemic  changes         [START ON 6/2/2023] aspirin chewable tablet 81 mg, 81 mg, Oral, QHS     [START ON 6/2/2023] aspirin tablet 325 mg, 325 mg, Oral, Once     atorvastatin tablet 40 mg, 40 mg, Oral, QHS     [START ON 6/2/2023] carvediloL tablet 3.125 mg, 3.125 mg, Oral, BID WM         Hypothyroidism  Continue home Synthroid 100mcg po qday  Repeat thyroid panels      Schizoaffective disorder, bipolar type  She was highly confused, agitated and manic in the ED  She had to get Ativan, benadryl and Haldol to finally calm down  She had PEC placed in the ED, discontinued now though she is still refusing many of her medications  Continue home Risperdal 2mg  She is non compliant with her meds per history  Having conversations with people who are not present in the room. Uncertain if having auditory hallucinations or not. Speaking today about events in the past as if they have just occurred      COPD (chronic obstructive pulmonary disease)  Duonebs q4 prn wheezing and SOB  aerobika       VTE Risk Mitigation (From admission, onward)         Ordered     enoxaparin injection 40 mg  Every 12 hours         06/01/23 2056     Place JAMES hose  Until discontinued         06/01/23 2056     IP VTE HIGH RISK PATIENT  Once         06/01/23 2056     Place sequential compression device  Until discontinued         06/01/23 2056                Discharge Planning   BEE:      Code Status: Full Code   Is the patient medically ready for discharge?:     Reason for patient still in hospital (select all that apply): Treatment  Discharge Plan A: Home Health                  Shonda Argueta MD  Department of Hospital Medicine   [facility]   no

## 2023-11-16 NOTE — PHARMACY MED REC
"[facility]  Transition of Care Assessment    Admit Date    11/6/2023    Hospital Visit CSN Number    [id number]   Discharge Date    11/8/2023    Preferred Pharmacies      [facility] [address] [place] [address], [place] - [address] [facility] [address]  [address] [place] [address] 90  [place] [address]  Phone: [phone] Fax: [phone]     Drug Card Received?      Yes []     No []      Allergies      Review of patient's allergies indicates:   Allergen Reactions    Cortisone      Other reaction(s): "breaks me out"    Iodine Hives    Iodine and iodide containing products          Telephone Medication Assessment Questions     How are you doing with your medications?         Have you experienced any side effects and/or noticed any difference after taking        your medications?         Do you know the purpose of each of your medications and how to take them?         Do you need assistance taking your medications?         Are you able to afford your medications?         Have you picked up your medications from the pharmacy?          Telephone Physician Follow-Up Questions     Have you seen your doctor?         Notes from your doctor visit (if applicable):         Have you had any medication changes?         Have you picked up the new prescription from the pharmacy and started taking it?         My new medications are (if applicable):         Are you experiencing any new side effects?         Do you have a follow-up appointment to see your doctor?         Do you have transportation to your appointment?         If needed, do you have someone to accompany you to your appointment?         Is there anything you feel you need help with at this time?          Additional Notes     1st phone call 11/16/2023 (call back).       The patient was previously educated on how to take their medication; including dosing, frequency, and side-effects.   "

## 2023-11-18 NOTE — ED PROVIDER NOTES
"Encounter Date: 11/18/2023       History     Chief Complaint   Patient presents with    generalized weakness      Pt reports generalized weakness. States she was using the bathroom today and was unable to get up.. Reports she does not believe her "fluid pill" is working.     68-year-old female, here from home via EMS complaining of generalized weakness.  Past medical history of COPD, CHF, bipolar, hypertension, thyroid disease.  Recently admitted here, then transferred to [facility].  Evidently she received a total of 3 units of packed red blood cells between the 2 hospitalist.  She refused endoscopy in any other similar intervention and was discharged home.  Patient states she is been somewhat weak and short of breath since discharge on 11 8.  Today she went to the bathroom and states she felt too weak to get up off the toilet.  Denies any frankly bloody stools but states her stools have been dark.  Denies abdominal pain.  She was mostly concerned with her respiratory status.  She states any sort of exertion causes her to be short of breath.  No chest pain or palpitations.  No fever or chills.  No sore throat, no cough.  Patient has been taking Lasix and states that she has actually lost a little weight since being discharged from the hospital.  Today, however, she states her urine output seems to have slowed somewhat.        Review of patient's allergies indicates:   Allergen Reactions    Cortisone      Other reaction(s): "breaks me out"    Iodine Hives    Iodine and iodide containing products      Past Medical History:   Diagnosis Date    Anxiety     Bipolar 1 disorder     Breast cancer     CHF (congestive heart failure)     COPD (chronic obstructive pulmonary disease)     Depression     Diabetes mellitus     Hypertension     PVD (peripheral vascular disease)     Schizophrenia     Thyroid disease      Past Surgical History:   Procedure Laterality Date    BREAST LUMPECTOMY      TONSILLECTOMY   "     Family History   Problem Relation Age of Onset    Lung cancer Mother     Breast cancer Neg Hx      Social History     Tobacco Use    Smoking status: Former     Types: Cigarettes     Passive exposure: Never    Smokeless tobacco: Never   Substance Use Topics    Alcohol use: Not Currently    Drug use: Never     Review of Systems   Constitutional:  Positive for fatigue. Negative for chills and fever.   HENT: Negative.     Eyes: Negative.    Respiratory:  Positive for shortness of breath. Negative for cough, chest tightness and wheezing.    Cardiovascular:  Negative for chest pain and palpitations.   Gastrointestinal:  Negative for abdominal pain, diarrhea, nausea and vomiting.        Dark stools   Endocrine: Negative.    Genitourinary: Negative.    Musculoskeletal: Negative.    Skin: Negative.    Neurological:  Positive for weakness. Negative for dizziness and light-headedness.   Psychiatric/Behavioral: Negative.         Physical Exam     Initial Vitals [11/18/23 1704]   BP Pulse Resp Temp SpO2   (!) 97/35 106 20 98.3 °F (36.8 °C) 96 %      MAP       --         Physical Exam    Nursing note and vitals reviewed.  Constitutional: She appears well-developed and well-nourished. She is not diaphoretic. No distress.   Obese, chronically ill appearance.  No acute distress.   HENT:   Head: Normocephalic and atraumatic.   Nose: Nose normal.   Mouth/Throat: Oropharynx is clear and moist.   Eyes: EOM are normal. Pupils are equal, round, and reactive to light. No scleral icterus.   Conjunctiva slightly pale   Neck: Neck supple. No JVD present.   Normal range of motion.  Cardiovascular:  Normal rate, regular rhythm, normal heart sounds and intact distal pulses.           No murmur heard.  Pulmonary/Chest: Breath sounds normal. No stridor. No respiratory distress. She has no wheezes. She has no rhonchi. She has no rales.   Abdominal: Abdomen is soft. Bowel sounds are normal. She exhibits no distension. There is no abdominal  tenderness. There is no rebound and no guarding.   Genitourinary:    Genitourinary Comments: Normal rectal tone, stool is dark.  No gay blood.     Musculoskeletal:         General: No tenderness or edema. Normal range of motion.      Cervical back: Normal range of motion and neck supple.     Neurological: She is alert and oriented to person, place, and time. She has normal strength. No cranial nerve deficit or sensory deficit. GCS score is 15. GCS eye subscore is 4. GCS verbal subscore is 5. GCS motor subscore is 6.   Skin: Skin is warm and dry. Capillary refill takes less than 2 seconds. No rash noted. No erythema. There is pallor (Mucous membranes appear to be slightly pale.).   Psychiatric: She has a normal mood and affect. Her behavior is normal.         ED Course   Central Line    Date/Time: 11/18/2023 7:38 PM    Performed by: Jorge Cardoso Jr., MD  Authorized by: Jorge Cardoso Jr., MD    Location procedure was performed:  [facility] EMERGENCY DEPARTMENT  Consent Done ?:  Yes  Time out complete?: Verified correct patient, procedure, equipment, staff, and site/side    Indications:  Vascular access  Anesthesia:  Local infiltration  Local anesthetic:  Lidocaine 1% without epinephrine  Anesthetic total (ml):  5  Preparation:  Skin prepped with ChloraPrep  Skin prep agent dried: Skin prep agent completely dried prior to procedure    Sterile barriers: All five maximal sterile barriers used - gloves, gown, cap, mask and large sterile sheet    Hand hygiene: Hand hygiene performed immediately prior to central venous catheter insertion    Location:  Right femoral  Site selection rationale:  Pt awake and declined other locations  Catheter type:  Triple lumen  Catheter size:  7 Fr  Ultrasound guidance: No    Manometry: No    Number of attempts:  1  Securement:  Line sutured, chlorhexidine patch, sterile dressing applied and blood return through all ports  Complications: No    Specimens: No    Implants: No    XRay:   Successful placement  Adverse Events:  NoneTermination Site: inferior vena cava    Labs Reviewed   CBC W/ AUTO DIFFERENTIAL - Abnormal; Notable for the following components:       Result Value    WBC 16.95 (*)     RBC 2.07 (*)     Hemoglobin 4.4 (*)     Hematocrit 16.5 (*)     MCV 80 (*)     MCH 21.3 (*)     MCHC 26.7 (*)     Gran % 84.0 (*)     Lymph % 14.0 (*)     Mono % 2.0 (*)     All other components within normal limits    Narrative:     juan a lofton rn by MIKALA 11/18/2023 19:10  Recoll. [id number] by MIKALA at 11/18/2023 18:25, reason: Specimen   clotted   COMPREHENSIVE METABOLIC PANEL - Abnormal; Notable for the following components:    CO2 20 (*)     Glucose 122 (*)     BUN 74 (*)     Total Protein 5.6 (*)     Albumin 3.0 (*)     Total Bilirubin 1.1 (*)     Alkaline Phosphatase 51 (*)     ALT 7 (*)     Anion Gap 18 (*)     All other components within normal limits   TROPONIN I - Abnormal; Notable for the following components:    Troponin I 0.033 (*)     All other components within normal limits   OCCULT BLOOD X 1, STOOL - Abnormal; Notable for the following components:    Occult Blood Positive (*)     All other components within normal limits   INFLUENZA A & B BY MOLECULAR   SARS-COV-2 RNA AMPLIFICATION, QUAL    Narrative:     Is the patient symptomatic?->No   B-TYPE NATRIURETIC PEPTIDE   LACTIC ACID, PLASMA   TYPE & SCREEN   PREPARE RBC SOFT     EKG Readings: (Independently Interpreted)   EKG personally reviewed by me shows sinus tachycardia, left anterior fascicular block, nonspecific ST and T-wave changes, 105 beats per minute, ND interval 158, .  No obvious ischemic change.       Imaging Results              X-Ray Chest 1 View (In process)                      Medications   0.9%  NaCl infusion (for blood administration) (has no administration in time range)   pantoprazole injection 80 mg (has no administration in time range)   pantoprazole (PROTONIX) 40 mg in sodium chloride 0.9 % 100 mL IVPB  (MB+) (has no administration in time range)     Medical Decision Making  Differential includes GI bleed, anemia, COPD exacerbation, CHF exacerbation, influenza, COVID-19, UTI, etc.    Labs ordered.  Vital signs are stable at this time.  At shift change, patient's care transferred to Dr. Cardoso.    Pt presented with gen weakness. She has recent hx of GI bleeding requiring transfusion but declined scope bc she had polyps on a scope 6yrs ago and she did not want to know if she had cancer. Pt seen by Dr Gallo and signed out to me for dispo. H/H 4/16. Pt poor IV access so CVL placed by me. She tolerated this well without complication. CMP unremarkable. BUN 74 with normal Cr. Heme pos black stool. Protonix gtt started. 2U transfusion started. Flu and covid neg. Troponin slightly elevated. Pt without CP and neg EKG. Pt agreed to transfer to [facility] and will consent to EGD/scope this time.         Amount and/or Complexity of Data Reviewed  Labs: ordered. Decision-making details documented in ED Course.  Radiology: ordered. Decision-making details documented in ED Course.  ECG/medicine tests: independent interpretation performed. Decision-making details documented in ED Course.    Risk  Prescription drug management.    Critical Care  Total time providing critical care: 45 minutes               ED Course as of 11/18/23 1951   Sat Nov 18, 2023 1836 Signed out to me by Dr Gallo. Pt here again with gen weakness and GI bleeding. She was transferred by me this month for same where she received several units RBC but declined GI intervention. Plan was to f/u labs and transfer.  [DC]   1905 Nursing staff unable to get PIV. Will place CVL. She will need to be transferred for GI consult.  [DC]      ED Course User Index  [DC] Jorge Cardoso Jr., MD                        Clinical Impression:  Final diagnoses:  [R53.1] Weakness  [K92.1] Melena (Primary)  [K92.2] Gastrointestinal hemorrhage, unspecified gastrointestinal hemorrhage  type  [D64.9] Severe anemia          ED Disposition Condition    Transfer to Another Facility Stable                Jorge Cardoso Jr., MD  11/18/23 1951

## 2023-11-18 NOTE — ED NOTES
Initial assessment complete. Pt presents with generalized weakness onset today. Reports she was using the bathroom and was unable to get up.

## 2023-11-19 PROBLEM — K92.1 MELENA: Status: ACTIVE | Noted: 2023-01-01

## 2023-11-19 PROBLEM — R58 HYPOTENSION DUE TO BLOOD LOSS: Status: ACTIVE | Noted: 2023-01-01

## 2023-11-19 PROBLEM — I95.89 HYPOTENSION DUE TO BLOOD LOSS: Status: RESOLVED | Noted: 2023-01-01 | Resolved: 2023-01-01

## 2023-11-19 PROBLEM — R58 HYPOTENSION DUE TO BLOOD LOSS: Status: RESOLVED | Noted: 2023-01-01 | Resolved: 2023-01-01

## 2023-11-19 PROBLEM — I95.89 HYPOTENSION DUE TO BLOOD LOSS: Status: ACTIVE | Noted: 2023-01-01

## 2023-11-19 PROBLEM — D50.9 IRON DEFICIENCY ANEMIA: Status: ACTIVE | Noted: 2023-01-01

## 2023-11-19 PROBLEM — D62 ACUTE BLOOD LOSS ANEMIA: Status: ACTIVE | Noted: 2023-01-01

## 2023-11-19 PROBLEM — F25.0 SCHIZOAFFECTIVE DISORDER, BIPOLAR TYPE: Status: RESOLVED | Noted: 2023-01-01 | Resolved: 2023-01-01

## 2023-11-19 PROBLEM — E66.01 SEVERE OBESITY (BMI >= 40): Status: RESOLVED | Noted: 2023-01-01 | Resolved: 2023-01-01

## 2023-11-19 PROBLEM — I73.9 PVD (PERIPHERAL VASCULAR DISEASE): Status: ACTIVE | Noted: 2023-01-01

## 2023-11-19 NOTE — ED NOTES
Two attempts for IV access with assistance of ultrasound were unsuccessful. MD notified and preparing for central line placement. Explained procedure to patient.

## 2023-11-19 NOTE — ED NOTES
Blood obtained but still no IV access. EDP elects to insert central line. Consent obtained. Questions answered.

## 2023-11-19 NOTE — ED NOTES
Low bp noted. Pt placed in trendelenburg and recycled bp. Noted O2 declined as well. Pt placed on 3 L via NC

## 2023-11-19 NOTE — ED NOTES
Central line consent signed after Dr. Cardoso and myself instructed patient of procedure details and risk. Pt verbalized understanding and written consent obtained.

## 2023-11-19 NOTE — ED NOTES
Right femoral 7fr triple lumen central line inserted per MD using sterile technique. Tolerated well x 1 attempt. No bleeding post insertion to site. All 3 ports flushed with adequate blood return noted. Secured with sutures per MD and dressing applied with bio-patch and transparent dressing.

## 2023-12-05 NOTE — PHARMACY MED REC
"[facility]  Transition of Care Assessment    Admit Date    11/6/2023    Hospital Visit CSN Number    [id number]   Discharge Date    11/8/2023    Preferred Pharmacies      [facility] [address] [place] [address], [place] - [address] [facility] [address]  [address] [place] [address]  [facility] [address]  Phone: [phone] Fax: [phone]     Drug Card Received?      Yes []     No []      Allergies      Review of patient's allergies indicates:   Allergen Reactions    Cortisone      Other reaction(s): "breaks me out"    Iodine Hives    Iodine and iodide containing products          Telephone Medication Assessment Questions     How are you doing with your medications?         Have you experienced any side effects and/or noticed any difference after taking        your medications?         Do you know the purpose of each of your medications and how to take them?         Do you need assistance taking your medications?         Are you able to afford your medications?         Have you picked up your medications from the pharmacy?          Telephone Physician Follow-Up Questions     Have you seen your doctor? If so, were there any changes made to your medications?         Do you need any assistance getting to your doctor's appointment?         Notes from your doctor visit (if applicable):         Have you picked up the new prescription from the pharmacy and started taking it?         My new medications are (if applicable):         Are you experiencing any new side effects?         Do you have a follow-up appointment to see your doctor?         Do you have transportation to your appointment?         If needed, do you have someone to accompany you to your appointment?         Is there anything you feel you need help with at this time?          Additional Notes            The patient was previously educated on how to take their medication; including dosing, frequency, and side-effects.    "

## 2023-12-07 NOTE — PROGRESS NOTES
CHW - Outreach Attempt    Community Health Worker left a voicemail message for 1st attempt to contact patient regarding:     Transportation  Prescription Assistance/Medication       Community Health Worker to attempt to contact patient on: 12/7/23

## 2023-12-13 NOTE — PROGRESS NOTES
CHW - Outreach Attempt    Community Health Worker left a voicemail message for 2nd attempt to contact patient regarding: Transportation  Prescription Assistance/Medication     Community Health Worker to attempt to contact patient on: 12/13/23

## 2023-12-20 NOTE — PROGRESS NOTES
CHW - Outreach Attempt    Community Health Worker left a voicemail message for 3rd attempt to contact patient regarding:  Transportation  Prescription Assistance/Medication      Community Health Worker to attempt to contact patient on: 12/20/23    CHW - Unable to Contact    Community Health Worker to close episode at this time due to three missed attempts for patient contact.

## 2024-01-01 ENCOUNTER — HOSPITAL ENCOUNTER (EMERGENCY)
Facility: HOSPITAL | Age: 69
End: 2024-03-31
Attending: STUDENT IN AN ORGANIZED HEALTH CARE EDUCATION/TRAINING PROGRAM
Payer: MEDICARE

## 2024-01-01 VITALS — WEIGHT: 235 LBS | HEIGHT: 63 IN | BODY MASS INDEX: 41.64 KG/M2

## 2024-01-01 DIAGNOSIS — I46.9 CARDIAC ARREST: Primary | ICD-10-CM

## 2024-01-01 PROCEDURE — 25000003 PHARM REV CODE 250

## 2024-01-01 PROCEDURE — 63600175 PHARM REV CODE 636 W HCPCS: Mod: UD | Performed by: STUDENT IN AN ORGANIZED HEALTH CARE EDUCATION/TRAINING PROGRAM

## 2024-01-01 PROCEDURE — 96374 THER/PROPH/DIAG INJ IV PUSH: CPT

## 2024-01-01 PROCEDURE — 31500 INSERT EMERGENCY AIRWAY: CPT

## 2024-01-01 PROCEDURE — 96375 TX/PRO/DX INJ NEW DRUG ADDON: CPT

## 2024-01-01 PROCEDURE — 63600175 PHARM REV CODE 636 W HCPCS

## 2024-01-01 PROCEDURE — 99291 CRITICAL CARE FIRST HOUR: CPT

## 2024-01-01 PROCEDURE — 27100171 HC OXYGEN HIGH FLOW UP TO 24 HOURS

## 2024-01-01 PROCEDURE — 25000003 PHARM REV CODE 250: Performed by: STUDENT IN AN ORGANIZED HEALTH CARE EDUCATION/TRAINING PROGRAM

## 2024-01-01 RX ORDER — MAGNESIUM SULFATE HEPTAHYDRATE 500 MG/ML
INJECTION, SOLUTION INTRAMUSCULAR; INTRAVENOUS CODE/TRAUMA/SEDATION MEDICATION
Status: COMPLETED | OUTPATIENT
Start: 2024-01-01 | End: 2024-01-01

## 2024-01-01 RX ORDER — AMIODARONE HYDROCHLORIDE 150 MG/3ML
INJECTION, SOLUTION INTRAVENOUS CODE/TRAUMA/SEDATION MEDICATION
Status: COMPLETED | OUTPATIENT
Start: 2024-01-01 | End: 2024-01-01

## 2024-01-01 RX ORDER — EPINEPHRINE 0.1 MG/ML
INJECTION INTRAVENOUS CODE/TRAUMA/SEDATION MEDICATION
Status: COMPLETED | OUTPATIENT
Start: 2024-01-01 | End: 2024-01-01

## 2024-01-01 RX ORDER — LIDOCAINE HYDROCHLORIDE 20 MG/ML
INJECTION INTRAVENOUS CODE/TRAUMA/SEDATION MEDICATION
Status: COMPLETED | OUTPATIENT
Start: 2024-01-01 | End: 2024-01-01

## 2024-01-01 RX ORDER — SODIUM BICARBONATE 1 MEQ/ML
SYRINGE (ML) INTRAVENOUS CODE/TRAUMA/SEDATION MEDICATION
Status: COMPLETED | OUTPATIENT
Start: 2024-01-01 | End: 2024-01-01

## 2024-01-01 RX ADMIN — LIDOCAINE HYDROCHLORIDE 150 MG: 20 INJECTION, SOLUTION INTRAVENOUS at 03:03

## 2024-01-01 RX ADMIN — AMIODARONE HYDROCHLORIDE 150 MG: 50 INJECTION, SOLUTION INTRAVENOUS at 03:03

## 2024-01-01 RX ADMIN — MAGNESIUM SULFATE HEPTAHYDRATE 2 G: 500 INJECTION, SOLUTION INTRAMUSCULAR; INTRAVENOUS at 03:03

## 2024-01-01 RX ADMIN — EPINEPHRINE 1 MG: 0.1 INJECTION INTRACARDIAC; INTRAVENOUS at 03:03

## 2024-01-01 RX ADMIN — SODIUM BICARBONATE 100 MEQ: 84 INJECTION, SOLUTION INTRAVENOUS at 03:03

## 2024-03-31 NOTE — ED PROVIDER NOTES
Encounter Date: 3/31/2024       History     Chief Complaint   Patient presents with    Cardiac Arrest     CARDIAC ARREST     68-year-old female history of COPD, CHF anxiety. She presents to ED accompanied by EMS personnel in cardiac arrest with ongoing CPR. Apparently patient was found by her vehicle unresponsive, and local police arrived initially who is initiated CPR. EMS arrived to the scene soon after reports initial rhythm of PEA.  Glucose read low per monitor according to EMS.  LMA was inserted by EMS. She received Epi x4,1 bicarb, 1 calcium, as well as 1 amp of D50, 500 cc normal saline bolus.  She did convert to V fib was defibrillated x2 200 arms, 250 amps, also received amiodarone 300 mg.   Upon arrival to the ED CPR continued as per ACLS protocol, she was intubated with 6.5 ET tube to establish definitive airway, monitor revealed fine V fib, defibrillated x3, Epi x4, received a 2nd dose of amiodarone 150 mg, remain in fine VFib and as such was given lidocaine 150mg, CPR continued, repeat rhythm check revealed PEA, and then asystole.  Unfortunately she never regained ROSC. Time of death called  1539.  Daughter was at bedside and saw the tail end of resuscitation efforts.     The history is provided by the EMS personnel and a relative. No  was used.     Review of patient's allergies indicates:  No Known Allergies  No past medical history on file.  No past surgical history on file.  No family history on file.     Review of Systems   Reason unable to perform ROS: CARDIAC ARREST.       Physical Exam     Initial Vitals   BP Pulse Resp Temp SpO2   -- -- -- -- --      MAP       --         Physical Exam    Constitutional: She appears well-developed.   HENT:   Head: Atraumatic.   Eyes:   FIXED PUPILS   Neck: No tracheal deviation present. No JVD present.   Cardiovascular:            CARDIAC ARREST   Pulmonary/Chest:   MECHANICAL LUNG SOUNDS   Abdominal:   OBESE ROUNDED ABDOMEN     Skin: Skin is  dry.   COOL SKIN         ED Course   Critical Care    Date/Time: 3/31/2024 4:12 PM    Performed by: Darwin Caballero MD  Authorized by: Darwin Caballero MD  Direct patient critical care time: 15 minutes  Additional history critical care time: 2 minutes  Consult with family critical care time: 2 minutes  Other critical care time: 3 minutes  Total critical care time (exclusive of procedural time) : 22 minutes  Critical care time was exclusive of separately billable procedures and treating other patients.  Critical care was necessary to treat or prevent imminent or life-threatening deterioration of the following conditions: CARDIAC ARREST.  Critical care was time spent personally by me on the following activities: evaluation of patient's response to treatment, examination of patient, obtaining history from patient or surrogate, ordering and performing treatments and interventions, pulse oximetry and re-evaluation of patient's condition.      Intubation    Date/Time: 3/31/2024 3:49 PM  Location procedure was performed: [facility] EMERGENCY DEPARTMENT    Performed by: Darwin Caballero MD  Authorized by: Darwin Caballero MD  Assisting provider: Darwin Caballero MD  Pre-operative diagnosis: CARDIAC ARREST  Post-operative diagnosis: CARDIAC ARREST  Indications: Cardiopulmonary arrest.  Description of findings: Blood in airway   Intubation method: video-assisted  Preoxygenation: bag valve mask  Pretreatment medications: none  Paralytic: none  Laryngoscope size: Glide 4  Tube size: 6.5 mm  Tube type: cuffed  Number of attempts: 1  Post-procedure assessment: chest rise and CO2 detector  Cuff inflated: yes  Technical procedures used: Glide scope video since  Significant surgical tasks conducted by the assistant(s): None  Complications: No  Estimated blood loss (mL): 5  Specimens: No  Implants: No        Labs Reviewed - No data to display       Imaging Results    None          Medications   EPINEPHrine 0.1 mg/mL injection (1  mg Intravenous Given 3/31/24 1542)   amiodarone injection (150 mg Intravenous Given 3/31/24 1526)   LIDOcaine (cardiac) injection (150 mg Intravenous Given 3/31/24 1531)   magnesium sulfate 500 mg/mL (50 %) injection (2 g Intravenous Given 3/31/24 1532)   sodium bicarbonate 8.4 % (1 mEq/mL) injection (100 mEq Intravenous Given 3/31/24 1535)     Medical Decision Making  68-year-old female Presents to ED accompanied by EMS personnel in cardiac arrest with ongoing CPR. Apparently patient was found by her vehicle unresponsive, and local police arrived initially who is initiated CPR. EMS arrived to the scene soon after reports initial rhythm of PEA.  Glucose read low per monitor according to EMS.  She received Epi x4,1 bicarb, 1 calcium, as well as 1 amp of D50, 500 cc normal saline bolus.  She did convert to V fib was defibrillated x2 200 arms, 250 amps, also received amiodarone 300 mg.   Upon arrival to the ED CPR continued as per ACLS protocol, monitor revealed fine V fib, defibrillated x3, Epi x4, received a 2nd dose of amiodarone 150 mg, remain in fine VFib and as such was given lidocaine 150mg, CPR continued, repeat rhythm check revealed PEA, and then asystole.  Unfortunately she never regained ROSC. Time of death called  . Daughter was at bedside and saw the tail end of resuscitation efforts.     Risk  Prescription drug management.                                      Clinical Impression:  Final diagnoses:  [I46.9] Cardiac arrest (Primary)          ED Disposition Condition                     Darwin Caballero MD  24 1616       Darwin Caballero MD  24 0142

## 2024-04-12 LAB — POCT GLUCOSE: 429 MG/DL (ref 70–110)
